# Patient Record
Sex: FEMALE | Race: ASIAN | Employment: UNEMPLOYED | URBAN - METROPOLITAN AREA
[De-identification: names, ages, dates, MRNs, and addresses within clinical notes are randomized per-mention and may not be internally consistent; named-entity substitution may affect disease eponyms.]

---

## 2017-07-21 ENCOUNTER — ALLSCRIPTS OFFICE VISIT (OUTPATIENT)
Dept: OTHER | Facility: OTHER | Age: 47
End: 2017-07-21

## 2017-07-21 DIAGNOSIS — K59.00 CONSTIPATION: ICD-10-CM

## 2017-07-21 DIAGNOSIS — Z12.31 ENCOUNTER FOR SCREENING MAMMOGRAM FOR MALIGNANT NEOPLASM OF BREAST: ICD-10-CM

## 2018-01-12 VITALS
TEMPERATURE: 99.3 F | DIASTOLIC BLOOD PRESSURE: 60 MMHG | SYSTOLIC BLOOD PRESSURE: 112 MMHG | BODY MASS INDEX: 23.73 KG/M2 | HEART RATE: 68 BPM | HEIGHT: 64 IN | RESPIRATION RATE: 14 BRPM | WEIGHT: 139 LBS

## 2018-01-16 NOTE — PROGRESS NOTES
Assessment    1  Anemia (285 9) (D64 9)   2  Hypothyroidism (244 9) (E03 9)   3  Vitamin D deficiency (268 9) (E55 9)    Plan  Anemia    · (1) IRON PANEL; Status:Active; Requested TUR:[id number];    · 1 - Malika DAVE, Ramana Rogers  (Hematology/Oncology) Physician Referral  Consult  Status:  Active  Requested for: [id number]  Care Summary provided  : Yes    Discussion/Summary    Anemia: hemoglobin 9 2 complains of fatigue and dizziness is intolerant to PO iron, 2/2 excessive constipation  Has had colonoscopy in the past  will refer to hematology, Iron studies ordered  Vitamin D def: Weekly Vitamin D ordered  Hypothyroidism: Stable continue Levothyroxine @ 137 mcg daily  Possible side effects of new medications were reviewed with the patient/guardian today  The treatment plan was reviewed with the patient/guardian  The patient/guardian understands and agrees with the treatment plan   The patient was counseled regarding prognosis, patient and family education, impressions, risks and benefits of treatment options  Chief Complaint  Feels tired       History of Present Illness  Patient in the office for follow up   Complains of fatigue and dizziness, Fatigue more when she has her periods  Has history of anemia and is currently not taking iron 2/2 her intolerance to PO iron,  She denies any bleeding per rectum she has had issues with blood in the stool for which she has followed with colo- rectal surgeon  She denies chest pain , shortness of breath nausea/vomiting  Review of Systems    Constitutional: as noted in HPI  Active Problems    1  Acute upper respiratory infection (465 9) (J06 9)   2  Anemia (285 9) (D64 9)   3  Constipation (564 00) (K59 00)   4  Dysuria (788 1) (R30 0)   5  Dysuria (788 1) (R30 0)   6  Encounter for screening mammogram for malignant neoplasm of breast (V76 12)   (Z12 31)   7  Fatigue (780 79) (R53 83)   8  Gastrointestinal bleeding (578 9) (K92 2)   9   Hemorrhoids (455 6) (K64 9)   10  History of familial combined hyperlipidemia (V12 29) (Z86 39)   11  Hypothyroidism (244 9) (E03 9)   12  Iron deficiency anemia (280 9) (D50 9)   13  Joint pain (719 40) (M25 50)   14  Need for prophylactic vaccination and inoculation against influenza (V04 81) (Z23)   15  Vitamin D deficiency (268 9) (E55 9)    Past Medical History    1  History of allergic rhinitis (V12 69) (Z87 09)   2  History of dyspareunia (V13 29)   3  History of hypercholesterolemia (V12 29) (Z86 39)   4  History of hypothyroidism (V12 29) (Z86 39)   5  Need for prophylactic vaccination and inoculation against influenza (V04 81) (Z23)    The active problems and past medical history were reviewed and updated today  Family History    The family history was reviewed and updated today  Social History    · Never A Smoker  The social history was reviewed and updated today  The social history was reviewed and is unchanged  Current Meds   1  Colace 100 MG Oral Capsule; TAKE 1 CAPSULE TWICE DAILY AS NEEDED; Therapy: 36WCP2728 to (Jennifer Nicholas)  Requested for: 64Efy5776; Last   Rx:[id number] Ordered   2  Ferrous Sulfate 325 (65 Fe) MG Oral Tablet; TAKE 1 TABLET DAILY WITH FOOD; Therapy: 19Iry7070 to [id number])  Requested for: 79212 80 22 97; Last   Rx:[id number] Ordered   3  Levothyroxine Sodium 137 MCG Oral Tablet; TAKE 1 TABLET DAILY; Therapy: 00OJO9010 to (Evaluate:63Enc8425)  Requested for: 20DTM5398; Last   Rx:00Nxy4200 Ordered   4  Vitamin D 2000 UNIT Oral Capsule; TAKE 1 CAPSULE Daily; Therapy: 92MFF3198 to (Evaluate:94Lcw2205)  Requested for: 78Nsa6753; Last   Rx:61Kbd0399 Ordered    The medication list was reviewed and updated today  Allergies    1  Amoxicillin CAPS   2  Sulfa Drugs    3  No Known Food Allergies    Physical Exam    Constitutional   General appearance: No acute distress, well appearing and well nourished      Ears, Nose, Mouth, and Throat   Otoscopic examination: Tympanic membranes translucent with normal light reflex  Canals patent without erythema  Pulmonary   Auscultation of lungs: Clear to auscultation  Abdomen   Abdomen: Non-tender, no masses  Musculoskeletal   Gait and station: Normal     Skin   Skin and subcutaneous tissue: Normal without rashes or lesions  Psychiatric   Orientation to person, place, and time: Normal     Mood and affect: Normal          Attending Note  Attending Note: I discussed the case with the Resident and reviewed the Resident's note, I supervised the Resident and I agree with the Resident management plan as it was presented to me  Level of Participation: I was present in clinic, but did not examine the patient  I agree with the Resident's note        Future Appointments    Date/Time Provider Specialty Site   07/19/2016 08:15 AM Latha Daly MD Hematology Oncology [facility]     Signatures   Electronically signed by : Shahbaz Mello, ; Jun 12 2016  4:44PM EST                       (Author)    Electronically signed by : Andre Chamberlain DO; Jun 14 2016  1:12PM EST                       (Author)

## 2018-01-24 ENCOUNTER — TELEPHONE (OUTPATIENT)
Dept: OTHER | Facility: HOSPITAL | Age: 48
End: 2018-01-24

## 2018-01-24 ENCOUNTER — APPOINTMENT (OUTPATIENT)
Dept: LAB | Facility: CLINIC | Age: 48
End: 2018-01-24
Payer: COMMERCIAL

## 2018-01-24 ENCOUNTER — TRANSCRIBE ORDERS (OUTPATIENT)
Dept: LAB | Facility: CLINIC | Age: 48
End: 2018-01-24

## 2018-01-24 DIAGNOSIS — E03.9 HYPOTHYROIDISM, UNSPECIFIED TYPE: Primary | ICD-10-CM

## 2018-01-24 DIAGNOSIS — K59.00 CONSTIPATION: ICD-10-CM

## 2018-01-24 LAB
ERYTHROCYTE [DISTWIDTH] IN BLOOD BY AUTOMATED COUNT: 20.7 % (ref 11.6–15.1)
HCT VFR BLD AUTO: 28.8 % (ref 34.8–46.1)
HGB BLD-MCNC: 8.3 G/DL (ref 11.5–15.4)
MCH RBC QN AUTO: 19.6 PG (ref 26.8–34.3)
MCHC RBC AUTO-ENTMCNC: 28.8 G/DL (ref 31.4–37.4)
MCV RBC AUTO: 68 FL (ref 82–98)
PLATELET # BLD AUTO: 402 THOUSANDS/UL (ref 149–390)
PMV BLD AUTO: 9.2 FL (ref 8.9–12.7)
RBC # BLD AUTO: 4.23 MILLION/UL (ref 3.81–5.12)
TSH SERPL DL<=0.05 MIU/L-ACNC: 5.43 UIU/ML (ref 0.36–3.74)
WBC # BLD AUTO: 6.63 THOUSAND/UL (ref 4.31–10.16)

## 2018-01-24 PROCEDURE — 84443 ASSAY THYROID STIM HORMONE: CPT

## 2018-01-24 PROCEDURE — 85027 COMPLETE CBC AUTOMATED: CPT

## 2018-01-24 PROCEDURE — 36415 COLL VENOUS BLD VENIPUNCTURE: CPT

## 2018-01-24 RX ORDER — DOCUSATE SODIUM 100 MG/1
1 CAPSULE, LIQUID FILLED ORAL 2 TIMES DAILY PRN
COMMUNITY
Start: 2013-07-26

## 2018-01-24 RX ORDER — FERROUS SULFATE 325(65) MG
1 TABLET ORAL DAILY
COMMUNITY
Start: 2015-04-24 | End: 2020-09-11 | Stop reason: ALTCHOICE

## 2018-01-24 RX ORDER — LEVOTHYROXINE SODIUM 137 UG/1
1 TABLET ORAL DAILY
COMMUNITY
Start: 2011-11-11 | End: 2018-02-26 | Stop reason: DRUGHIGH

## 2018-01-24 RX ORDER — SENNA AND DOCUSATE SODIUM 50; 8.6 MG/1; MG/1
8.6-5 TABLET, FILM COATED ORAL 2 TIMES DAILY PRN
COMMUNITY
Start: 2017-07-21 | End: 2019-04-04 | Stop reason: ALTCHOICE

## 2018-01-26 RX ORDER — LEVOTHYROXINE SODIUM 137 UG/1
137 TABLET ORAL DAILY
Refills: 0 | OUTPATIENT
Start: 2018-01-26

## 2018-01-26 RX ORDER — LEVOTHYROXINE SODIUM 0.15 MG/1
150 TABLET ORAL DAILY
Qty: 30 TABLET | Refills: 1 | Status: SHIPPED | OUTPATIENT
Start: 2018-01-26 | End: 2018-02-26 | Stop reason: SDUPTHER

## 2018-02-26 DIAGNOSIS — E03.9 HYPOTHYROIDISM, UNSPECIFIED TYPE: ICD-10-CM

## 2018-02-26 RX ORDER — LEVOTHYROXINE SODIUM 0.15 MG/1
150 TABLET ORAL DAILY
Qty: 30 TABLET | Refills: 1 | Status: SHIPPED | OUTPATIENT
Start: 2018-02-26 | End: 2018-04-30 | Stop reason: SDUPTHER

## 2018-03-12 ENCOUNTER — TELEPHONE (OUTPATIENT)
Dept: FAMILY MEDICINE CLINIC | Facility: CLINIC | Age: 48
End: 2018-03-12

## 2018-03-12 DIAGNOSIS — Z11.1 NORMAL SCREENING CHEST X-RAY FOR TUBERCULOSIS: Primary | ICD-10-CM

## 2018-03-12 NOTE — TELEPHONE ENCOUNTER
Patient's employer requesting chest xray as she is not candidate for PPD placement  They are also requesting health maintenance which is scheduled for Fri 3/16/18  Would you please enter chest xray order so she can have done prior to visit  Thanks!

## 2018-03-14 ENCOUNTER — HOSPITAL ENCOUNTER (OUTPATIENT)
Dept: RADIOLOGY | Facility: HOSPITAL | Age: 48
Discharge: HOME | End: 2018-03-14
Payer: COMMERCIAL

## 2018-03-14 DIAGNOSIS — Z11.1 NORMAL SCREENING CHEST X-RAY FOR TUBERCULOSIS: ICD-10-CM

## 2018-03-14 PROCEDURE — 71046 X-RAY EXAM CHEST 2 VIEWS: CPT

## 2018-03-16 ENCOUNTER — TELEPHONE (OUTPATIENT)
Dept: FAMILY MEDICINE CLINIC | Facility: CLINIC | Age: 48
End: 2018-03-16

## 2018-03-16 ENCOUNTER — OFFICE VISIT (OUTPATIENT)
Dept: FAMILY MEDICINE CLINIC | Facility: CLINIC | Age: 48
End: 2018-03-16
Payer: COMMERCIAL

## 2018-03-16 VITALS
HEIGHT: 64 IN | DIASTOLIC BLOOD PRESSURE: 60 MMHG | RESPIRATION RATE: 16 BRPM | BODY MASS INDEX: 22.94 KG/M2 | WEIGHT: 134.4 LBS | HEART RATE: 76 BPM | SYSTOLIC BLOOD PRESSURE: 100 MMHG | TEMPERATURE: 97.8 F

## 2018-03-16 DIAGNOSIS — M25.541 ARTHRALGIA OF BOTH HANDS: ICD-10-CM

## 2018-03-16 DIAGNOSIS — R53.83 OTHER FATIGUE: ICD-10-CM

## 2018-03-16 DIAGNOSIS — Z00.00 ENCOUNTER FOR ANNUAL HEALTH EXAMINATION: Primary | ICD-10-CM

## 2018-03-16 DIAGNOSIS — M25.542 ARTHRALGIA OF BOTH HANDS: ICD-10-CM

## 2018-03-16 DIAGNOSIS — Z23 NEED FOR INFLUENZA VACCINATION: ICD-10-CM

## 2018-03-16 DIAGNOSIS — Z23 NEED FOR TDAP VACCINATION: ICD-10-CM

## 2018-03-16 DIAGNOSIS — D50.9 IRON DEFICIENCY ANEMIA, UNSPECIFIED IRON DEFICIENCY ANEMIA TYPE: ICD-10-CM

## 2018-03-16 DIAGNOSIS — E03.9 HYPOTHYROIDISM, UNSPECIFIED TYPE: ICD-10-CM

## 2018-03-16 PROCEDURE — 90715 TDAP VACCINE 7 YRS/> IM: CPT | Performed by: FAMILY MEDICINE

## 2018-03-16 PROCEDURE — 90686 IIV4 VACC NO PRSV 0.5 ML IM: CPT | Performed by: FAMILY MEDICINE

## 2018-03-16 PROCEDURE — 99396 PREV VISIT EST AGE 40-64: CPT | Performed by: FAMILY MEDICINE

## 2018-03-16 PROCEDURE — 90471 IMMUNIZATION ADMIN: CPT | Performed by: FAMILY MEDICINE

## 2018-03-16 NOTE — ASSESSMENT & PLAN NOTE
On iron daily and docusate 100 mg b i d  Last CBC in January 2018 showed hemoglobin 8 3  Advised patient to eat iron rich diet, continue same medication regimen     will repeat CBC

## 2018-03-16 NOTE — ASSESSMENT & PLAN NOTE
TSH in January 2018 showed 5 4, levothyroxine was increased to 150 mcg, will repeat TSH levels  and advised to continue same medication regimen at this time

## 2018-03-16 NOTE — ASSESSMENT & PLAN NOTE
Likely due to stress at home  Differential:  Stress at home versus electrolyte deficiency,  Versus anemia  Versus autoimmune pathology versus uncontrolled hypothyroidism  Will check CBC, BMP, lipid panel, sed rate to rule out pathology  Advised patient to eat healthy diet

## 2018-03-16 NOTE — PROGRESS NOTES
Dayanna Hdz 1970 female MRN: [id number]    Health Maintenance Visit       Assessment and plan  Shekhar was seen today for  Health maintenance      ASSESSMENT/PLAN  Problem List Items Addressed This Visit     Fatigue      Likely due to stress at home  Differential:  Stress at home versus electrolyte deficiency,  Versus anemia  Versus autoimmune pathology versus uncontrolled hypothyroidism  Will check CBC, BMP, lipid panel, sed rate to rule out pathology  Advised patient to eat healthy diet  Hypothyroidism      TSH in January 2018 showed 5 4, levothyroxine was increased to 150 mcg, will repeat TSH levels  and advised to continue same medication regimen at this time  Relevant Orders    Lipid panel    TSH, 3rd generation    Iron deficiency anemia      On iron daily and docusate 100 mg b i d  Last CBC in January 2018 showed hemoglobin 8 3  Advised patient to eat iron rich diet, continue same medication regimen  will repeat CBC         Relevant Orders    CBC    Joint pain      Ordered CBC, sed rate to rule out  Inflammatory disease  Advised to take Tylenol p r n  for pain         Relevant Orders    Basic metabolic panel    Sedimentation rate, automated    Encounter for annual health examination - Primary      Screening mammogram script given to patient  Last Pap with HPV negative in 2015,  Next due in 2020  Tdap and flu vaccine given at this visit  PHQ 2 scale 0   Hearing within normal limits   Vision 20/40 bilaterally with corrective glasses  Advised to follow up with optometrist  for vision check  In addition to the above, the patient was counseled on general preventative health care subjects, including but not limited to:  - Nutrition, healthy weight, aerobic and weight-bearing exercise  - Mental health, social support, and self care  - Patient made aware of  services at the office                 Other Visit Diagnoses     Need for influenza vaccination Relevant Orders    FLU VACCINE QUADRIVALENT GREATER THAN OR EQUAL TO 4YO PRESERVATIVE FREE IM (Completed)    Need for Tdap vaccination        Relevant Orders    TDAP VACCINE GREATER THAN OR EQUAL TO 8YO IM (Completed)          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, required chest x-ray for tuberculosis screening as she had BCG  Vaccine  Patient will drop paperwork for nursing home at our office  Chest x-ray results are pending  Fill out paperwork for patient once I received chest x-ray result  Follow-up in 2 months, advised to schedule appointment if she experiences vaginal discharge again  SUBJECTIVE    HPI:  Lorenzo Pena is a 41 y o  female who presented for a routine health maintenance visit  Pap test questions: periods are regular  no unusual pelvic pain  no previous abnormal Pap tests  no family or personal history of cervical cancer  mammography questions: no new or changing breast lumps  no unusual breast pains  no discharge from the nipples  no previous abnormal mammograms  no personal or family history of breast cancer      CRC screening: No personal or family history of colon cancer or colon polyps  BrCa screening: There is no personal or family history of breast cancer  She denies finding new breast lumps, breast pain or nipple discharge  CVS screening: Patient denies any exertional chest pain, dyspnea, palpitations, syncope, orthopnea, edema or paroxysmal nocturnal dyspnea  DM screening: No polyuria, polydipsia, blurry vision, chest pain, dyspnea or claudication  No foot burning, numbness or pain  No personal or family history of skin cancers or melanoma  Patient reports having vaginal discharge on and off since few months, specially before and after menstrual cycle  She is currently having menstrual cycle  Denies any pelvic pain  Also reports the pain in multiple joints of fingers and sometimes knee pain  Also experiencing muscle cramps in both legs and feet occasionally   had bypass surgery, reports having stress at home  Review of Systems   Constitutional: Positive for fatigue  Negative for chills and unexpected weight change  HENT: Negative  Eyes: Negative  Respiratory: Negative  Cardiovascular: Negative  Gastrointestinal: Negative  Genitourinary: Positive for vaginal discharge  Negative for dyspareunia, dysuria, frequency, menstrual problem and pelvic pain  Musculoskeletal: Positive for arthralgias  Negative for back pain, gait problem and joint swelling  Skin: Negative  Allergic/Immunologic: Negative  Neurological: Negative  Hematological: Negative  Psychiatric/Behavioral: Negative  Historical Information   Past Medical History:   Diagnosis Date    Dyspareunia in female     Hypercholesterolemia     Hypothyroidism        No past surgical history on file  No family history on file  Social History   History   Alcohol use Not on file     History   Drug use: Unknown     History   Smoking Status    Unknown If Ever Smoked   Smokeless Tobacco    Not on file       Medications:    Current Outpatient Prescriptions   Medication Sig Dispense Refill    docusate sodium (COLACE) 100 mg capsule Take 1 capsule by mouth 2 (two) times a day as needed      ferrous sulfate 325 (65 Fe) mg tablet Take 1 tablet by mouth Daily      levothyroxine 150 mcg tablet Take 1 tablet (150 mcg total) by mouth daily 30 tablet 1    senna-docusate sodium (SENOKOT-S) 8 6-50 mg per tablet Take 8 6-50 mg by mouth 2 (two) times a day as needed       No current facility-administered medications for this visit            Allergies   Allergen Reactions    Amoxicillin     Sulfa Antibiotics        OBJECTIVE  Vitals:   Vitals:    03/16/18 1102   BP: 100/60   Pulse: 76   Resp: 16   Temp: 97 8 °F (36 6 °C)   Weight: 61 kg (134 lb 6 4 oz)   Height: 5' 4 1" (1 628 m)     Wt Readings from Last 3 Encounters:   03/16/18 61 kg (134 lb 6 4 oz)   07/21/17 63 kg (139 lb) 06/03/16 67 2 kg (148 lb 4 oz)     Body mass index is 23 kg/m²  BP Readings from Last 3 Encounters:   03/16/18 100/60   07/21/17 112/60   06/03/16 104/70     Pulse Readings from Last 3 Encounters:   03/16/18 76   07/21/17 68   06/03/16 70       Physical Exam   Constitutional: She is oriented to person, place, and time  She appears well-developed and well-nourished  No distress  HENT:   Head: Normocephalic and atraumatic  Right Ear: External ear normal    Left Ear: External ear normal    Mouth/Throat: Oropharynx is clear and moist  No oropharyngeal exudate  Eyes: Conjunctivae and EOM are normal  Pupils are equal, round, and reactive to light  Right eye exhibits no discharge  Left eye exhibits no discharge  Neck: Normal range of motion  Neck supple  No thyromegaly present  Cardiovascular: Normal rate, regular rhythm and normal heart sounds  Exam reveals no gallop and no friction rub  No murmur heard  Pulmonary/Chest: Effort normal and breath sounds normal  No respiratory distress  She has no wheezes  She has no rales  She exhibits no tenderness  Abdominal: Soft  Bowel sounds are normal  She exhibits no distension  There is no tenderness  There is no rebound and no guarding  Musculoskeletal: Normal range of motion  Very mild varicose vein  In lower extremities  No swelling at joints of  Fingers bilaterally, full range of motion,   Painful on range of motion  Neurological: She is alert and oriented to person, place, and time  She exhibits normal muscle tone  Skin: Skin is warm  No rash noted  Psychiatric: She has a normal mood and affect  Her behavior is normal  Judgment and thought content normal         Lab:  I have personally reviewed all pertinent results       Appointment on 01/24/2018   Component Date Value Ref Range Status    WBC 01/24/2018 6 63  4 31 - 10 16 Thousand/uL Final    RBC 01/24/2018 4 23  3 81 - 5 12 Million/uL Final    Hemoglobin 01/24/2018 8 3* 11 5 - 15 4 g/dL Final    Hematocrit 01/24/2018 28 8* 34 8 - 46 1 % Final    MCV 01/24/2018 68* 82 - 98 fL Final    MCH 01/24/2018 19 6* 26 8 - 34 3 pg Final    MCHC 01/24/2018 28 8* 31 4 - 37 4 g/dL Final    RDW 01/24/2018 20 7* 11 6 - 15 1 % Final    Platelets 17/79/2678 402* 149 - 390 Thousands/uL Final    MPV 01/24/2018 9 2  8 9 - 12 7 fL Final    TSH 3RD GENERATON 01/24/2018 5 429* 0 358 - 3 740 uIU/mL Final       Most Recent Immunizations   Administered Date(s) Administered     Influenza (IM) Preservative Free 01/11/2013    Influenza Quadrivalent Preservative Free 3 years and older IM 03/16/2018    Influenza TIV (IM) 11/10/2014    Tdap 03/16/2018       Immunization status: up to date and documented         _____________________________________________________________________     John Lara MD, PGY-2  [facility] Medicine   3/16/2018

## 2018-03-16 NOTE — ASSESSMENT & PLAN NOTE
Screening mammogram script given to patient  Last Pap with HPV negative in 2015,  Next due in 2020  Tdap and flu vaccine given at this visit  PHQ 2 scale 0   Hearing within normal limits   Vision 20/40 bilaterally with corrective glasses  Advised to follow up with optometrist  for vision check  In addition to the above, the patient was counseled on general preventative health care subjects, including but not limited to:  - Nutrition, healthy weight, aerobic and weight-bearing exercise  - Mental health, social support, and self care  - Patient made aware of  services at the office

## 2018-03-16 NOTE — TELEPHONE ENCOUNTER
Pt was seen today by Dr Tania Avila  Wants xray results  Dropped off information sheet for Dr Tania Avila with info on where to fax results Fax number [phone]  Placed in Dr Miriam Alves bin  States she was told by Dr Tania Avila to drop off the info      Any questions please call her

## 2018-04-30 DIAGNOSIS — E03.9 HYPOTHYROIDISM, UNSPECIFIED TYPE: ICD-10-CM

## 2018-04-30 RX ORDER — LEVOTHYROXINE SODIUM 0.15 MG/1
TABLET ORAL
Qty: 30 TABLET | Refills: 1 | Status: SHIPPED | OUTPATIENT
Start: 2018-04-30 | End: 2018-06-29 | Stop reason: SDUPTHER

## 2018-06-29 DIAGNOSIS — E03.9 HYPOTHYROIDISM, UNSPECIFIED TYPE: ICD-10-CM

## 2018-06-29 RX ORDER — LEVOTHYROXINE SODIUM 0.15 MG/1
150 TABLET ORAL DAILY
Qty: 30 TABLET | Refills: 0 | Status: SHIPPED | OUTPATIENT
Start: 2018-06-29 | End: 2018-07-31 | Stop reason: SDUPTHER

## 2018-07-31 DIAGNOSIS — E03.9 HYPOTHYROIDISM, UNSPECIFIED TYPE: ICD-10-CM

## 2018-08-01 RX ORDER — LEVOTHYROXINE SODIUM 0.15 MG/1
150 TABLET ORAL DAILY
Qty: 30 TABLET | Refills: 0 | Status: SHIPPED | OUTPATIENT
Start: 2018-08-01 | End: 2018-08-16 | Stop reason: SDUPTHER

## 2018-08-01 NOTE — TELEPHONE ENCOUNTER
Patient has pending blood work - TSH  Last labs were in 01/2018  Please advise patient to do blood work  I will refill medication for 30 days  Thank you

## 2018-08-16 ENCOUNTER — APPOINTMENT (OUTPATIENT)
Dept: LAB | Facility: CLINIC | Age: 48
End: 2018-08-16

## 2018-08-16 ENCOUNTER — TRANSCRIBE ORDERS (OUTPATIENT)
Dept: LAB | Facility: CLINIC | Age: 48
End: 2018-08-16

## 2018-08-16 DIAGNOSIS — E03.9 HYPOTHYROIDISM, UNSPECIFIED TYPE: ICD-10-CM

## 2018-08-16 DIAGNOSIS — N94.6 DYSMENORRHEA: Primary | ICD-10-CM

## 2018-08-16 DIAGNOSIS — M25.541 ARTHRALGIA OF BOTH HANDS: ICD-10-CM

## 2018-08-16 DIAGNOSIS — M25.542 ARTHRALGIA OF BOTH HANDS: ICD-10-CM

## 2018-08-16 DIAGNOSIS — D50.9 IRON DEFICIENCY ANEMIA, UNSPECIFIED IRON DEFICIENCY ANEMIA TYPE: ICD-10-CM

## 2018-08-16 LAB
ANION GAP SERPL CALCULATED.3IONS-SCNC: 4 MMOL/L (ref 4–13)
BUN SERPL-MCNC: 12 MG/DL (ref 5–25)
CALCIUM SERPL-MCNC: 8.6 MG/DL (ref 8.3–10.1)
CHLORIDE SERPL-SCNC: 105 MMOL/L (ref 100–108)
CHOLEST SERPL-MCNC: 176 MG/DL (ref 50–200)
CO2 SERPL-SCNC: 29 MMOL/L (ref 21–32)
CREAT SERPL-MCNC: 0.75 MG/DL (ref 0.6–1.3)
ERYTHROCYTE [DISTWIDTH] IN BLOOD BY AUTOMATED COUNT: 18.6 % (ref 11.6–15.1)
ERYTHROCYTE [SEDIMENTATION RATE] IN BLOOD: 13 MM/HOUR (ref 0–20)
GFR SERPL CREATININE-BSD FRML MDRD: 96 ML/MIN/1.73SQ M
GLUCOSE P FAST SERPL-MCNC: 88 MG/DL (ref 65–99)
HCT VFR BLD AUTO: 29.2 % (ref 34.8–46.1)
HDLC SERPL-MCNC: 48 MG/DL (ref 40–60)
HGB BLD-MCNC: 9 G/DL (ref 11.5–15.4)
LDLC SERPL CALC-MCNC: 104 MG/DL (ref 0–100)
MCH RBC QN AUTO: 22.4 PG (ref 26.8–34.3)
MCHC RBC AUTO-ENTMCNC: 30.8 G/DL (ref 31.4–37.4)
MCV RBC AUTO: 73 FL (ref 82–98)
NONHDLC SERPL-MCNC: 128 MG/DL
PLATELET # BLD AUTO: 317 THOUSANDS/UL (ref 149–390)
PMV BLD AUTO: 8.9 FL (ref 8.9–12.7)
POTASSIUM SERPL-SCNC: 3.9 MMOL/L (ref 3.5–5.3)
RBC # BLD AUTO: 4.01 MILLION/UL (ref 3.81–5.12)
SODIUM SERPL-SCNC: 138 MMOL/L (ref 136–145)
TRIGL SERPL-MCNC: 119 MG/DL
TSH SERPL DL<=0.05 MIU/L-ACNC: 5.19 UIU/ML (ref 0.36–3.74)
WBC # BLD AUTO: 5.56 THOUSAND/UL (ref 4.31–10.16)

## 2018-08-16 PROCEDURE — 36415 COLL VENOUS BLD VENIPUNCTURE: CPT

## 2018-08-16 PROCEDURE — 85027 COMPLETE CBC AUTOMATED: CPT

## 2018-08-16 PROCEDURE — 80061 LIPID PANEL: CPT

## 2018-08-16 PROCEDURE — 85652 RBC SED RATE AUTOMATED: CPT

## 2018-08-16 PROCEDURE — 84443 ASSAY THYROID STIM HORMONE: CPT

## 2018-08-16 PROCEDURE — 80048 BASIC METABOLIC PNL TOTAL CA: CPT

## 2018-08-16 RX ORDER — LEVOTHYROXINE SODIUM 175 UG/1
175 TABLET ORAL DAILY
Qty: 60 TABLET | Refills: 0 | Status: SHIPPED | OUTPATIENT
Start: 2018-08-16 | End: 2018-10-25 | Stop reason: SDUPTHER

## 2018-08-16 RX ORDER — MEFENAMIC ACID 250 MG/1
250 CAPSULE ORAL EVERY 6 HOURS PRN
Qty: 60 CAPSULE | Refills: 0 | Status: SHIPPED | OUTPATIENT
Start: 2018-08-16 | End: 2019-04-04 | Stop reason: SDUPTHER

## 2018-09-04 ENCOUNTER — HOSPITAL ENCOUNTER (OUTPATIENT)
Dept: RADIOLOGY | Age: 48
Discharge: HOME | End: 2018-09-04

## 2018-09-04 DIAGNOSIS — Z12.31 ENCOUNTER FOR SCREENING MAMMOGRAM FOR MALIGNANT NEOPLASM OF BREAST: ICD-10-CM

## 2018-09-04 PROCEDURE — 77067 SCR MAMMO BI INCL CAD: CPT

## 2018-10-16 ENCOUNTER — TRANSCRIBE ORDERS (OUTPATIENT)
Dept: LAB | Facility: CLINIC | Age: 48
End: 2018-10-16

## 2018-10-16 ENCOUNTER — APPOINTMENT (OUTPATIENT)
Dept: LAB | Facility: CLINIC | Age: 48
End: 2018-10-16

## 2018-10-16 DIAGNOSIS — E03.9 HYPOTHYROIDISM, UNSPECIFIED TYPE: ICD-10-CM

## 2018-10-16 LAB — TSH SERPL DL<=0.05 MIU/L-ACNC: 0.95 UIU/ML (ref 0.36–3.74)

## 2018-10-16 PROCEDURE — 36415 COLL VENOUS BLD VENIPUNCTURE: CPT

## 2018-10-16 PROCEDURE — 84443 ASSAY THYROID STIM HORMONE: CPT

## 2018-10-25 DIAGNOSIS — E03.9 HYPOTHYROIDISM, UNSPECIFIED TYPE: ICD-10-CM

## 2018-10-26 RX ORDER — LEVOTHYROXINE SODIUM 175 UG/1
175 TABLET ORAL DAILY
Qty: 90 TABLET | Refills: 1 | Status: SHIPPED | OUTPATIENT
Start: 2018-10-26 | End: 2019-04-26 | Stop reason: SDUPTHER

## 2018-11-05 ENCOUNTER — OFFICE VISIT (OUTPATIENT)
Dept: URGENT CARE | Age: 48
End: 2018-11-05
Payer: COMMERCIAL

## 2018-11-05 ENCOUNTER — HOSPITAL ENCOUNTER (EMERGENCY)
Facility: HOSPITAL | Age: 48
Discharge: HOME | End: 2018-11-05
Attending: EMERGENCY MEDICINE
Payer: COMMERCIAL

## 2018-11-05 VITALS
TEMPERATURE: 98.2 F | OXYGEN SATURATION: 99 % | SYSTOLIC BLOOD PRESSURE: 145 MMHG | RESPIRATION RATE: 20 BRPM | DIASTOLIC BLOOD PRESSURE: 64 MMHG | HEART RATE: 90 BPM

## 2018-11-05 VITALS
TEMPERATURE: 98.7 F | DIASTOLIC BLOOD PRESSURE: 76 MMHG | SYSTOLIC BLOOD PRESSURE: 124 MMHG | WEIGHT: 135 LBS | HEIGHT: 64 IN | BODY MASS INDEX: 23.05 KG/M2 | HEART RATE: 75 BPM | OXYGEN SATURATION: 100 %

## 2018-11-05 DIAGNOSIS — M54.9 BACK PAIN: ICD-10-CM

## 2018-11-05 DIAGNOSIS — N39.0 URINARY TRACT INFECTION WITHOUT HEMATURIA, SITE UNSPECIFIED: Primary | ICD-10-CM

## 2018-11-05 DIAGNOSIS — M54.50 ACUTE BILATERAL LOW BACK PAIN WITHOUT SCIATICA: ICD-10-CM

## 2018-11-05 DIAGNOSIS — K62.5 RECTAL BLEEDING: ICD-10-CM

## 2018-11-05 DIAGNOSIS — N39.0 UTI (URINARY TRACT INFECTION): Primary | ICD-10-CM

## 2018-11-05 LAB
SL AMB  POCT GLUCOSE, UA: ABNORMAL
SL AMB LEUKOCYTE ESTERASE,UA: ABNORMAL
SL AMB POCT BILIRUBIN,UA: ABNORMAL
SL AMB POCT BLOOD,UA: ABNORMAL
SL AMB POCT CLARITY,UA: CLEAR
SL AMB POCT COLOR,UA: YELLOW
SL AMB POCT KETONES,UA: ABNORMAL
SL AMB POCT NITRITE,UA: ABNORMAL
SL AMB POCT PH,UA: 5
SL AMB POCT SPECIFIC GRAVITY,UA: 1
SL AMB POCT URINE PROTEIN: ABNORMAL
SL AMB POCT UROBILINOGEN: 0.2

## 2018-11-05 PROCEDURE — 82272 OCCULT BLD FECES 1-3 TESTS: CPT

## 2018-11-05 PROCEDURE — 99283 EMERGENCY DEPT VISIT LOW MDM: CPT

## 2018-11-05 PROCEDURE — 87086 URINE CULTURE/COLONY COUNT: CPT | Performed by: NURSE PRACTITIONER

## 2018-11-05 PROCEDURE — 81002 URINALYSIS NONAUTO W/O SCOPE: CPT | Performed by: FAMILY MEDICINE

## 2018-11-05 PROCEDURE — 99213 OFFICE O/P EST LOW 20 MIN: CPT | Performed by: FAMILY MEDICINE

## 2018-11-05 RX ORDER — ACETAMINOPHEN 325 MG/1
650 TABLET ORAL ONCE
Status: COMPLETED | OUTPATIENT
Start: 2018-11-05 | End: 2018-11-05

## 2018-11-05 RX ORDER — CIPROFLOXACIN 250 MG/1
500 TABLET, FILM COATED ORAL EVERY 12 HOURS SCHEDULED
Qty: 10 TABLET | Refills: 0 | Status: SHIPPED | OUTPATIENT
Start: 2018-11-05 | End: 2018-11-10

## 2018-11-05 RX ORDER — CIPROFLOXACIN 500 MG/1
500 TABLET, FILM COATED ORAL ONCE
Status: COMPLETED | OUTPATIENT
Start: 2018-11-05 | End: 2018-11-05

## 2018-11-05 RX ADMIN — ACETAMINOPHEN 650 MG: 325 TABLET, FILM COATED ORAL at 17:13

## 2018-11-05 RX ADMIN — CIPROFLOXACIN HYDROCHLORIDE 500 MG: 500 TABLET, FILM COATED ORAL at 17:13

## 2018-11-05 NOTE — PROGRESS NOTES
[facility]        NAME: Chuyita Becker is a 57 y o  female  : 1970    MRN: [id number]  DATE: 2018  TIME: 3:01 PM    Assessment and Plan   Urinary tract infection without hematuria, site unspecified [N39 0]  1  Urinary tract infection without hematuria, site unspecified  ciprofloxacin (CIPRO) 250 mg tablet    POCT urine dip   2  Acute bilateral low back pain without sciatica  Transfer to other facility     At end of visit, patient states she will go to [facility]  Patient Instructions     Patient Instructions   As we discussed there is moderated amount of Leukocytes and small amount of blood in urine  This is seen with UTI  At this time VSS, we can try outpatient treatment to see if there is improvement or you can go to ER for full evaluation  At this time you are requesting to start antibiotic  Continue Tylenol or Motrin  If symptoms become worse, you develop fever or any worsening symptoms you need to go directly to ER  You verbalized understanding of this  For the hemorrhoids continue OTC treatment  Follow up with GI for evaluation  Go to ER if pain become worse, or you develop rectal bleeding  Chief Complaint     Chief Complaint   Patient presents with    Hemorrhoids    Back Pain         History of Present Illness   Chuyita Becker presents to the clinic c/o    This is a 57year old female here today with back pain x 1 week  She has history of hemorroroids which is flaring  She states she has a flare about every 2-3 months  No specific injury  No history of back pain  She did try tylenol 2 days ago which did help  She has been using witch hazel, preparation H cream   She has noticed some blood in stool yesterday in toilet and then again when she wiped  No loss of bowel or bladder  No fever  No numbness or tingling down of leg  No history of kidney stone  No urinary symptoms, no nausea, vomiting  She states nothing makes pain worse            Review of Systems   Review of Systems   Constitutional: Positive for activity change  Negative for chills, fatigue and fever  HENT: Negative  Respiratory: Negative  Cardiovascular: Negative  Gastrointestinal: Negative  Genitourinary: Negative  Musculoskeletal: Positive for arthralgias and back pain  Neurological: Negative  Current Medications     Long-Term Prescriptions   Medication Sig Dispense Refill    docusate sodium (COLACE) 100 mg capsule Take 1 capsule by mouth 2 (two) times a day as needed      levothyroxine 175 mcg tablet Take 1 tablet (175 mcg total) by mouth daily 90 tablet 1    ferrous sulfate 325 (65 Fe) mg tablet Take 1 tablet by mouth Daily      Mefenamic Acid 250 MG CAPS Take 1 capsule (250 mg total) by mouth every 6 (six) hours as needed (menstrual cycle pain) (Patient not taking: Reported on 11/5/2018 ) 60 capsule 0    senna-docusate sodium (SENOKOT-S) 8 6-50 mg per tablet Take 8 6-50 mg by mouth 2 (two) times a day as needed         Current Allergies     Allergies as of 11/05/2018 - Reviewed 11/05/2018   Allergen Reaction Noted    Amoxicillin  01/11/2013    Sulfa antibiotics  12/12/2013            The following portions of the patient's history were reviewed and updated as appropriate: allergies, current medications, past family history, past medical history, past social history, past surgical history and problem list     Objective   /76   Pulse 75   Temp 98 7 °F (37 1 °C)   Ht 5' 4" (1 626 m)   Wt 61 2 kg (135 lb)   LMP 10/17/2018   SpO2 100%   BMI 23 17 kg/m²        Physical Exam     Physical Exam   Constitutional: She is oriented to person, place, and time  She appears well-developed  HENT:   Head: Normocephalic  Right Ear: External ear normal    Left Ear: External ear normal    Neck: Normal range of motion  Pulmonary/Chest: Effort normal    Musculoskeletal:   Lumbar spine:  No ttp over the spine,  Minimal TTP over paralumbar region  Full ROM  Negative straight leg test   No CVA tenderness  Neurological: She is alert and oriented to person, place, and time  Skin: Skin is warm and dry  Psychiatric: She has a normal mood and affect  Her behavior is normal    Nursing note and vitals reviewed  Urine dip: positive leukocytes and blood

## 2018-11-05 NOTE — ED PROVIDER NOTES
History  Chief Complaint   Patient presents with    Back Pain     Patient c/o lower back pain for approx 1 week  Patient sent in by another facility to r/o UTI/Kidney stone   Hemorrhoids     Patient c/o having hemorrhoids and is having "fresh blood" coming out from the rectal area  Noticed when she wiped her rectal after after using the bathroom yesterday  Patient presents to the emergency department for evaluation of rectal bleeding  Patient states that she has external hemorrhoids and she will intermittently see blood on the toilet paper  She did so 2 days ago  She was seen at [facility] prior to arrival diagnosed with the urinary tract infection  She was given a script for ciprofloxacin  The patient denies fever chills nausea vomiting or diarrhea  She denies abdominal pain  She denies vaginal bleeding or bleeding elsewhere  She is not on blood thinners  She did not receive pain medicine or ciprofloxacin at [facility]  Prior to Admission Medications   Prescriptions Last Dose Informant Patient Reported? Taking?    Mefenamic Acid 250 MG CAPS  Self No No   Sig: Take 1 capsule (250 mg total) by mouth every 6 (six) hours as needed (menstrual cycle pain)   Patient not taking: Reported on 11/5/2018    ciprofloxacin (CIPRO) 250 mg tablet   No No   Sig: Take 2 tablets (500 mg total) by mouth every 12 (twelve) hours for 5 days   docusate sodium (COLACE) 100 mg capsule  Self Yes No   Sig: Take 1 capsule by mouth 2 (two) times a day as needed   ferrous sulfate 325 (65 Fe) mg tablet  Self Yes No   Sig: Take 1 tablet by mouth Daily   levothyroxine 175 mcg tablet  Self No No   Sig: Take 1 tablet (175 mcg total) by mouth daily   senna-docusate sodium (SENOKOT-S) 8 6-50 mg per tablet  Self Yes No   Sig: Take 8 6-50 mg by mouth 2 (two) times a day as needed      Facility-Administered Medications: None       Past Medical History:   Diagnosis Date    Dyspareunia in female    [facility] Hypercholesterolemia     Hypothyroidism        History reviewed  No pertinent surgical history  History reviewed  No pertinent family history  I have reviewed and agree with the history as documented  Social History   Substance Use Topics    Smoking status: Never Smoker    Smokeless tobacco: Never Used    Alcohol use No        Review of Systems   Constitutional: Negative  Negative for activity change, appetite change, chills, diaphoresis, fatigue, fever and unexpected weight change  HENT: Negative  Negative for congestion, drooling, rhinorrhea, trouble swallowing and voice change  Eyes: Negative  Negative for photophobia and visual disturbance  Respiratory: Negative  Negative for chest tightness, shortness of breath, wheezing and stridor  Cardiovascular: Negative  Negative for chest pain, palpitations and leg swelling  Gastrointestinal: Positive for anal bleeding  Negative for abdominal distention, abdominal pain, blood in stool, constipation, diarrhea, nausea, rectal pain and vomiting  Endocrine: Negative  Genitourinary: Positive for dysuria, frequency and urgency  Negative for vaginal bleeding, vaginal discharge and vaginal pain  Musculoskeletal: Positive for back pain  Negative for arthralgias, joint swelling, myalgias, neck pain and neck stiffness  Skin: Negative  Negative for rash and wound  Allergic/Immunologic: Negative  Neurological: Negative  Negative for dizziness, tremors, seizures, syncope, facial asymmetry, speech difficulty, weakness, light-headedness, numbness and headaches  Hematological: Negative  Does not bruise/bleed easily  Psychiatric/Behavioral: Negative  Negative for confusion  Physical Exam  Physical Exam   Constitutional: She is oriented to person, place, and time  She appears well-developed and well-nourished  Nontoxic appearance  No distress  HENT:   Head: Normocephalic and atraumatic     Right Ear: External ear normal    Left Ear: External ear normal    Mouth/Throat: Oropharynx is clear and moist    Eyes: Pupils are equal, round, and reactive to light  Conjunctivae and EOM are normal    Neck: Normal range of motion  Neck supple  Cardiovascular: Normal rate, regular rhythm, normal heart sounds and intact distal pulses  Pulmonary/Chest: Effort normal and breath sounds normal  No respiratory distress  She has no wheezes  She has no rales  She exhibits no tenderness  Abdominal: Soft  Bowel sounds are normal  She exhibits no distension and no mass  There is no tenderness  There is no rebound and no guarding  No hernia  No reproducible abdominal tenderness to palpation  No peritoneal signs  Genitourinary:   Genitourinary Comments: Rectal examination performed with Beka Ortiz the nurse covering the patient  There are 2 small nonbleeding non thrombosed external hemorrhoids  No fissures or other lesions  Was no bright red blood or melena  The stool was Hemoccult negative for blood  Musculoskeletal: Normal range of motion  She exhibits no edema, tenderness or deformity  No reproducible flank tenderness  Mild paralumbar tenderness to palpation with mild decreased range of motion secondary to pain  Neurological: She is alert and oriented to person, place, and time  She has normal reflexes  She displays normal reflexes  No cranial nerve deficit or sensory deficit  She exhibits normal muscle tone  Coordination normal    Skin: Skin is warm and dry  No rash noted  No erythema  No pallor  Psychiatric: She has a normal mood and affect  Her behavior is normal  Judgment and thought content normal    Nursing note and vitals reviewed        Vital Signs  ED Triage Vitals   Temperature Pulse Respirations Blood Pressure SpO2   11/05/18 1603 11/05/18 1601 11/05/18 1601 11/05/18 1601 11/05/18 1601   98 2 °F (36 8 °C) 90 20 145/64 99 %      Temp Source Heart Rate Source Patient Position - Orthostatic VS BP Location FiO2 (%)   11/05/18 1603 11/05/18 1601 11/05/18 1601 11/05/18 1601 --   Oral Monitor Sitting Left arm       Pain Score       11/05/18 1601       8           Vitals:    11/05/18 1601   BP: 145/64   Pulse: 90   Patient Position - Orthostatic VS: Sitting       Visual Acuity      ED Medications  Medications   ciprofloxacin (CIPRO) tablet 500 mg (500 mg Oral Given 11/5/18 1713)   acetaminophen (TYLENOL) tablet 650 mg (650 mg Oral Given 11/5/18 1713)       Diagnostic Studies  Results Reviewed     None                 No orders to display              Procedures  Procedures       Phone Contacts  ED Phone Contact    ED Course  ED Course as of Nov 05 1720   [facility] Nov 05, 2018   1708 Patient is stable for discharge  Patient was given Cipro for her urinary tract infection and Tylenol for her back pain  Rectal exam showed no evidence of bleeding  Her Hemoccult was negative  She will be referred to GI  I did discuss signs and symptoms that would require return to the emergency department  MDM  CritCare Time    Disposition  Final diagnoses:   UTI (urinary tract infection)   Back pain   Rectal bleeding     Time reflects when diagnosis was documented in both MDM as applicable and the Disposition within this note     Time User Action Codes Description Comment    11/5/2018  5:08 PM Ankur Lion Add [N39 0] UTI (urinary tract infection)     11/5/2018  5:08 PM Ankur Lion Add [M54 9] Back pain     11/5/2018  5:08 PM Ankur Reyes Add [K62 5] Rectal bleeding       ED Disposition     ED Disposition Condition Comment    Discharge  Kevin Last discharge to home/self care      Condition at discharge: Good        Follow-up Information     Follow up With Specialties Details Why Contact Mauricio Koo MD Gastroenterology Schedule an appointment as soon as possible for a visit  [address] [place] [address]  [phone]            Discharge Medication List as of 11/5/2018  5:10 PM      CONTINUE these medications which have NOT CHANGED    Details   ciprofloxacin (CIPRO) 250 mg tablet Take 2 tablets (500 mg total) by mouth every 12 (twelve) hours for 5 days, Starting Mon 11/5/2018, Until Sat 11/10/2018, Normal      docusate sodium (COLACE) 100 mg capsule Take 1 capsule by mouth 2 (two) times a day as needed, Starting Fri 7/26/2013, Historical Med      ferrous sulfate 325 (65 Fe) mg tablet Take 1 tablet by mouth Daily, Starting Fri 4/24/2015, Historical Med      levothyroxine 175 mcg tablet Take 1 tablet (175 mcg total) by mouth daily, Starting Fri 10/26/2018, Normal      Mefenamic Acid 250 MG CAPS Take 1 capsule (250 mg total) by mouth every 6 (six) hours as needed (menstrual cycle pain), Starting Thu 8/16/2018, Normal      senna-docusate sodium (SENOKOT-S) 8 6-50 mg per tablet Take 8 6-50 mg by mouth 2 (two) times a day as needed, Starting Fri 7/21/2017, Historical Med           No discharge procedures on file      ED Provider  Electronically Signed by           Hunter Oropeza MD  11/05/18 0233

## 2018-11-05 NOTE — PATIENT INSTRUCTIONS
As we discussed there is moderated amount of Leukocytes and small amount of blood in urine  This is seen with UTI  At this time VSS, we can try outpatient treatment to see if there is improvement or you can go to ER for full evaluation  At this time you are requesting to start antibiotic  Continue Tylenol or Motrin  If symptoms become worse, you develop fever or any worsening symptoms you need to go directly to ER  You verbalized understanding of this  For the hemorrhoids continue OTC treatment  Follow up with GI for evaluation  Go to ER if pain become worse, or you develop rectal bleeding

## 2018-11-05 NOTE — DISCHARGE INSTRUCTIONS
Urinary Tract Infection in Women   WHAT YOU NEED TO KNOW:   What is a urinary tract infection (UTI)? A UTI is caused by bacteria that get inside your urinary tract  Your urinary tract includes your kidneys, ureters, bladder, and urethra  Urine is made in your kidneys, and it flows from the ureters to the bladder  Urine leaves the bladder through the urethra  A UTI is more common in your lower urinary tract, which includes your bladder and urethra  What increases my risk for a UTI? · A urinary catheter or self-catheterization    · Pregnancy    · Urinary tract problems, such as a narrowing, kidney stones, or inability to empty your bladder completely    · History of a UTI    · Sexual intercourse    · Menopause    · Diabetes or obesity  What are the signs and symptoms of a UTI? · Urinating more often than usual, leaking urine, or waking from sleep to urinate    · Pain or burning when you urinate    · Pain or pressure in your lower abdomen     · Urine that smells bad    · Blood in your urine  How is a UTI diagnosed? Your healthcare provider will ask about your signs and symptoms  Your provider may press on your abdomen, sides, and back to check if you feel pain  Your urine will be tested for bacteria that may be causing your infection  If you have UTIs often, you may need more tests to find the cause  How is a UTI treated? · Antibiotics  help fight a bacterial infection  · Medicines  may be given to decrease pain and burning when you urinate  They will also help decrease the feeling that you need to urinate often  These medicines will make your urine orange or red  What can I do to prevent a UTI? · Empty your bladder often  Urinate and empty your bladder as soon as you feel the need  Do not hold your urine for long periods of time  · Wipe from front to back after you urinate or have a bowel movement    This will help prevent germs from getting into your urinary tract through your urethra  · Drink liquids as directed  Ask how much liquid to drink each day and which liquids are best for you  You may need to drink more liquids than usual to help flush out the bacteria  Do not drink alcohol, caffeine, or citrus juices  These can irritate your bladder and increase your symptoms  Your healthcare provider may recommend cranberry juice to help prevent a UTI  · Urinate after you have sex  This can help flush out bacteria passed during sex  · Do not douche or use feminine deodorants  These can change the chemical balance in your vagina  · Change sanitary pads or tampons often  This will help prevent germs from getting into your urinary tract  · Do pelvic muscle exercises often  Pelvic muscle exercises may help you start and stop urinating  Strong pelvic muscles may help you empty your bladder easier  Squeeze these muscles tightly for 5 seconds like you are trying to hold back urine  Then relax for 5 seconds  Gradually work up to squeezing for 10 seconds  Do 3 sets of 15 repetitions a day, or as directed  When should I seek immediate care? · You are urinating very little or not at all  · You have a high fever with shaking chills  · You have side or back pain that gets worse  When should I contact my healthcare provider? · You have a mild fever  · You do not feel better after 2 days of taking antibiotics  · You have new symptoms, such as blood or pus in your urine  · You are vomiting  · You have questions or concerns about your condition or care  CARE AGREEMENT:   You have the right to help plan your care  Learn about your health condition and how it may be treated  Discuss treatment options with your caregivers to decide what care you want to receive  You always have the right to refuse treatment  The above information is an  only  It is not intended as medical advice for individual conditions or treatments   Talk to your doctor, nurse or pharmacist before following any medical regimen to see if it is safe and effective for you  © 2017 [employer] Information is for End User's use only and may not be sold, redistributed or otherwise used for commercial purposes  All illustrations and images included in CareNotes® are the copyrighted property of [employer]  or [employer]  Rectal Bleeding   WHAT YOU NEED TO KNOW:   What can cause rectal bleeding? · Constipation    · Hemorrhoids (swollen blood vessels in your rectum)    · Anal fissures (tears in the tissue inside your anus)    · Medical conditions, such as cancer, colitis, or diverticulitis     · Growths, such as tumors or polyps    · Medical treatments, such as radiation or rectal surgery  What increases my risk for rectal bleeding? · Older age    · Certain medicines, such as blood thinners and NSAIDs    · Medical conditions, such as inflammatory bowel disease, liver disease, or HIV  What other signs and symptoms may happen with rectal bleeding? You may have pain in your rectum or anus  You may also have abdominal pain or cramping  How is the cause of rectal bleeding diagnosed? · Rectal exam:  Your healthcare provider may gently insert a gloved finger into your anus  He will collect a bowel movement sample and send it to a lab for tests  · Blood tests: You may need blood taken to check for anemia (low amount of red blood cells)  · CT scan: This test is also called a CAT scan  An x-ray machine uses a computer to take pictures of the organs and blood vessels in your abdomen  The pictures may show problems that could cause bleeding  You may be given a dye before the pictures are taken to help healthcare providers see the pictures better  Tell the healthcare provider if you have ever had an allergic reaction to contrast dye  · Colonoscopy: This is a procedure to look inside your lower bowel   It may show where the bleeding is coming from and what is causing it  A tube with a light on the end will be put into your anus and then moved into your colon  If your healthcare provider finds a growth, he may remove it  · Endoscopy: This is a procedure to look inside your upper bowel  It may show where the bleeding is coming from and what is causing it  A tube with a light on the end is inserted into your throat and moved down into your stomach and upper bowel  If your healthcare provider finds a growth, he may remove it  He may put a shot of medicine in bleeding areas to narrow the blood vessels and stop the bleeding  Heat, laser, or electric currents may also be used to make the blood clot  How is rectal bleeding treated? · Medicine:      ¨ Pain medicine: You may be given a prescription medicine to decrease pain  Do not wait until the pain is severe before you take this medicine  ¨ Vasoconstrictors: This medicine decreases the size of your blood vessels and may help stop the bleeding  ¨ Iron supplement:  Iron helps your body make more red blood cells  ¨ Steroids: This medicine decreases inflammation in your rectum  It may be applied as a cream, ointment, or lotion  · IV:  You may need an IV if you are dehydrated and need extra liquids  · Blood transfusion:  You will get whole or parts of blood through an IV during a transfusion  Blood is tested for diseases, such as hepatitis and HIV, to be sure it is safe  · Surgery: You may need surgery to remove hemorrhoids, tumors, or polyps  What are the risks of rectal bleeding? · You may have abdominal pain or damage to nearby organs and blood vessels with surgery  Even with treatment, rectal bleeding may continue  Or, it may go away for a time and start again  · Without treatment, you may continue to have pain and cramping  You may develop anemia  You may need a blood transfusion  You may lose a large amount of blood  This can be life-threatening  How can I manage my symptoms?   Ask your healthcare provider how much liquid to drink each day and which liquids are best for you  This will help prevent dehydration and constipation  When should I contact my healthcare provider? · You have a fever  · Your rectal bleeding stopped for a time, but has started again  · You have nausea  · You have cold, sweaty, pale skin  · You have changes in your bowel movements, such as diarrhea  · You have questions or concerns about your condition or care  When should I seek immediate care or call 911? · You are breathing faster than usual     · You are dizzy, lightheaded, or feel faint  · You are confused or cannot think clearly  · You urinate less than usual or not at all  · Your rectal bleeding is constant or heavy  · You have severe abdominal pain or cramping  CARE AGREEMENT:   You have the right to help plan your care  Learn about your health condition and how it may be treated  Discuss treatment options with your caregivers to decide what care you want to receive  You always have the right to refuse treatment  The above information is an  only  It is not intended as medical advice for individual conditions or treatments  Talk to your doctor, nurse or pharmacist before following any medical regimen to see if it is safe and effective for you  © 2017 [employer]  Information is for End User's use only and may not be sold, redistributed or otherwise used for commercial purposes  All illustrations and images included in CareNotes® are the copyrighted property of [employer]  or [employer]

## 2018-11-06 LAB — BACTERIA UR CULT: NORMAL

## 2018-11-07 ENCOUNTER — TELEPHONE (OUTPATIENT)
Dept: URGENT CARE | Age: 48
End: 2018-11-07

## 2018-11-07 NOTE — TELEPHONE ENCOUNTER
Urine culture negative  Recommend she stop antibiotic  She continues to have back pain  She has been using Tylenol BID  She states she now has some pain in her legs  Recommend she follow up with PCP  If symptoms get worse to go to ER

## 2019-02-19 ENCOUNTER — OFFICE VISIT (OUTPATIENT)
Dept: FAMILY MEDICINE CLINIC | Facility: CLINIC | Age: 49
End: 2019-02-19

## 2019-02-19 VITALS
TEMPERATURE: 98.1 F | RESPIRATION RATE: 16 BRPM | SYSTOLIC BLOOD PRESSURE: 110 MMHG | HEART RATE: 78 BPM | HEIGHT: 64 IN | BODY MASS INDEX: 24.11 KG/M2 | WEIGHT: 141.2 LBS | DIASTOLIC BLOOD PRESSURE: 66 MMHG

## 2019-02-19 DIAGNOSIS — K64.4 EXTERNAL HEMORRHOIDS: ICD-10-CM

## 2019-02-19 DIAGNOSIS — Z23 ENCOUNTER FOR IMMUNIZATION: ICD-10-CM

## 2019-02-19 DIAGNOSIS — R53.83 OTHER FATIGUE: ICD-10-CM

## 2019-02-19 DIAGNOSIS — I83.893 VARICOSE VEINS OF LEG WITH SWELLING, BILATERAL: ICD-10-CM

## 2019-02-19 DIAGNOSIS — E03.9 HYPOTHYROIDISM, UNSPECIFIED TYPE: Primary | ICD-10-CM

## 2019-02-19 DIAGNOSIS — D50.9 IRON DEFICIENCY ANEMIA, UNSPECIFIED IRON DEFICIENCY ANEMIA TYPE: ICD-10-CM

## 2019-02-19 DIAGNOSIS — R00.2 PALPITATIONS: ICD-10-CM

## 2019-02-19 PROCEDURE — 90686 IIV4 VACC NO PRSV 0.5 ML IM: CPT | Performed by: FAMILY MEDICINE

## 2019-02-19 PROCEDURE — 99213 OFFICE O/P EST LOW 20 MIN: CPT | Performed by: FAMILY MEDICINE

## 2019-02-19 PROCEDURE — 90471 IMMUNIZATION ADMIN: CPT | Performed by: FAMILY MEDICINE

## 2019-02-19 NOTE — PROGRESS NOTES
Daryel Alpers 1970 female MRN: [id number]  Family medicine follow up Visit        ASSESSMENT/PLAN  Diagnoses and all orders for this visit:    Hypothyroidism:  Continue levothyroxine 175 mcg daily  Check TSH and lipid panel     -     TSH, 3rd generation; Future  -     Lipid panel; Future    Iron deficiency anemia:  Check CBC, continue iron once daily  Advised patient eat diet rich in iron  -     CBC; Future    External hemorrhoids:  Advised patient to increase water and fiber intake in diet  Prescribed nifedipine lidocaine rectal med for hemorrhoids  Continue Colace 100 mg b i d  For constipation and  can use MiraLax as needed  Referral given to surgery for surgical management  -     Ambulatory referral to General Surgery; Future  -     NIFEdipine 0 3%-lidocaine 5% rectal ointment; Apply a small amount to anal hemorrhoids 3 times daily prn      Varicose veins of leg with swelling, bilateral:  Well criteria score: 1, (tenderness on localized area), low pretest probability  Check D-dimer for DVT  -     D-dimer, quantitative; Future    Palpitations:  Likely anxiety related or overcontrolled hypothyroidism  Will check TSH levels  EKG in office was not working, ordered EKG to rule out any cardiac abnormality  -      ECG      Follow-up in 3 months, will call patient with results  SUBJECTIVE  CC: Follow up for hypothyroidism, iron deficiency anemia, external hemorrhoids  New complaints:  Palpitations, lower leg pain  HPI  Daryel Alpers is a 57 y o  female here for follow up for hypothyroidism, iron deficiency anemia, external hemorrhoids  Hypothyroidism:  She reports taking levothyroxine 175 mcg daily  She denies any heat or cold intolerance, weight changes  Denies any side effects with medication  Iron deficiency anemia:  She takes iron intermittently as a cause of her constipation  She reports eating green vegetables  a lot  She has been taking Colace for constipation    She reports drinking lot of water and fiber in her diet  External hemorrhoids:  She was seen in the ER few days ago for bleeding from external hemorrhoids  Palpitations:  Reports having palpitations occasionally more often before menstrual cycles since few months  Denies any anxiety at that time  Palpitations sometimes last for 3-4 hours and resolved on its own  She denies any chest pain  She does not have any previous cardiac history  He has been taking her levothyroxine as prescribed and last TSH was normal    Lower leg pain:  Reports bilateral lower leg pain behind knees occasionally  since few months  She also reports calf pain occasionally, not related to activity  Reported achy pain, 4/10, no aggravating or relieving factors  Denies any history of previous clot  She is worried that she may have clot in her leg as she heard from her friend  Review of Systems   Constitutional: Positive for fatigue  Negative for activity change and appetite change  HENT: Negative for congestion, postnasal drip and rhinorrhea  Respiratory: Negative for cough, shortness of breath and wheezing  Cardiovascular: Positive for palpitations  Negative for chest pain and leg swelling  Gastrointestinal: Negative for abdominal pain, constipation and diarrhea  Endocrine: Negative for cold intolerance, heat intolerance, polydipsia, polyphagia and polyuria  Musculoskeletal: Negative for arthralgias  Leg pain   Skin: Negative for color change  Neurological: Negative for weakness and headaches  Psychiatric/Behavioral: Negative for dysphoric mood and sleep disturbance  Historical Information   The patient history was reviewed as follows:  Past Medical History:   Diagnosis Date    Dyspareunia in female     Hypercholesterolemia     Hypothyroidism      History reviewed  No pertinent surgical history  History reviewed  No pertinent family history     Social History   Social History     Substance and Sexual Activity   Alcohol Use No     Social History     Substance and Sexual Activity   Drug Use No     Social History     Tobacco Use   Smoking Status Never Smoker   Smokeless Tobacco Never Used       Medications:   Meds/Allergies   Current Outpatient Medications   Medication Sig Dispense Refill    docusate sodium (COLACE) 100 mg capsule Take 1 capsule by mouth 2 (two) times a day as needed      ferrous sulfate 325 (65 Fe) mg tablet Take 1 tablet by mouth Daily      levothyroxine 175 mcg tablet Take 1 tablet (175 mcg total) by mouth daily 90 tablet 1    Mefenamic Acid 250 MG CAPS Take 1 capsule (250 mg total) by mouth every 6 (six) hours as needed (menstrual cycle pain) (Patient not taking: Reported on 11/5/2018 ) 60 capsule 0    NIFEdipine 0 3%-lidocaine 5% rectal ointment Apply a small amount to anal hemorrhoids 3 times daily prn 2 oz 0    senna-docusate sodium (SENOKOT-S) 8 6-50 mg per tablet Take 8 6-50 mg by mouth 2 (two) times a day as needed       No current facility-administered medications for this visit  Allergies   Allergen Reactions    Amoxicillin     Sulfa Antibiotics        OBJECTIVE  Vitals:   Vitals:    02/19/19 1111   BP: 110/66   Pulse: 78   Resp: 16   Temp: 98 1 °F (36 7 °C)   Weight: 64 kg (141 lb 3 2 oz)   Height: 5' 4" (1 626 m)       Invasive Devices          None          Physical Exam   Constitutional: She is oriented to person, place, and time  She appears well-developed and well-nourished  HENT:   Head: Normocephalic  Right Ear: External ear normal    Left Ear: External ear normal    Mouth/Throat: Oropharynx is clear and moist    Eyes: Conjunctivae are normal    Neck: Neck supple  Cardiovascular: Normal rate, regular rhythm, normal heart sounds and intact distal pulses  Pulmonary/Chest: Effort normal and breath sounds normal    Abdominal: Soft  Bowel sounds are normal    Musculoskeletal: Normal range of motion     Varicose vein at the back of knee, very minimal swelling bilaterally  Tenderness to palpation in the back of knee and 1 cm  Below back of knee  No calf tenderness on palpation bilaterally  Neurological: She is alert and oriented to person, place, and time  She exhibits normal muscle tone  Skin: Skin is warm  Psychiatric: She has a normal mood and affect   Her behavior is normal         _____________________________________________________________________

## 2019-02-20 ENCOUNTER — TELEPHONE (OUTPATIENT)
Dept: FAMILY MEDICINE CLINIC | Facility: CLINIC | Age: 49
End: 2019-02-20

## 2019-02-20 DIAGNOSIS — K64.4 EXTERNAL HEMORRHOIDS: Primary | ICD-10-CM

## 2019-02-20 NOTE — TELEPHONE ENCOUNTER
I prescribed that medication ( nifedipine- lidocaine ractal ointment) for hemorrhoids  If that is not available, I prescribed proctofoam rectal cream  Thank you

## 2019-02-22 ENCOUNTER — CONSULT (OUTPATIENT)
Dept: SURGERY | Facility: CLINIC | Age: 49
End: 2019-02-22
Payer: COMMERCIAL

## 2019-02-22 VITALS
SYSTOLIC BLOOD PRESSURE: 112 MMHG | TEMPERATURE: 98 F | WEIGHT: 138 LBS | RESPIRATION RATE: 18 BRPM | DIASTOLIC BLOOD PRESSURE: 68 MMHG | HEIGHT: 64 IN | HEART RATE: 65 BPM | BODY MASS INDEX: 23.56 KG/M2

## 2019-02-22 DIAGNOSIS — K62.5 RECTAL BLEEDING: ICD-10-CM

## 2019-02-22 DIAGNOSIS — K64.1 GRADE II HEMORRHOIDS: Primary | ICD-10-CM

## 2019-02-22 PROBLEM — K64.9 HEMORRHOIDS: Status: ACTIVE | Noted: 2019-02-19

## 2019-02-22 PROCEDURE — 99243 OFF/OP CNSLTJ NEW/EST LOW 30: CPT | Performed by: FAMILY MEDICINE

## 2019-02-22 NOTE — PROGRESS NOTES
Patient ID: Vivian Zuniga is a 57 y o  female  Chief Complaint: Rectal bleeding    Notes for this visit:  Rectal bleeding:  -Bleeding likely 2/2 hemorrhoids but she needs repeat colonoscopy to rule out other causes  -As she has symptoms approximately every three months, trial of life style modifications prior to further intervention is recommended  Encouraged to increase fiber intake and avoid hard stools  Discussed in detail   -Risks, benefits and alternatives discussed in detail  All questions answered and patient agrees  -Prescribed Analpram for use when symptomatic  -Schedule diagnostic colonoscopy      History:    HPI: 57 F complaining of painful bleeding hemorrhoids  Preceded by hard stool  Bleeding for two days, and pain for two weeks  Has symptom free interval up to three months - until next hard stool  Taking Metamucil once daily  On going problem for last ten years  Tried Preparation H with no improvement  Had colonoscopy ten years ago which showed hemorrhoids, per patient  Had banding five years ago with no improvement in symptoms  Saw PCP for this recently, was advised trial of nifedipine cream, high fiber diet, and referred here  The following portions of the patient's history were reviewed and updated as appropriate: allergies  Review of Systems   Constitutional: Negative for chills and fever  Respiratory: Negative for shortness of breath  Cardiovascular: Negative for chest pain  Gastrointestinal: Positive for anal bleeding, blood in stool and rectal pain  Negative for abdominal distention, abdominal pain, constipation, diarrhea, nausea and vomiting         Patient Active Problem List   Diagnosis    Constipation    Fatigue    Hypothyroidism    Iron deficiency anemia    Joint pain    Encounter for annual health examination    Dysmenorrhea    External hemorrhoids    Varicose veins of leg with swelling, bilateral     Current Outpatient Medications:     docusate sodium (COLACE) 100 mg capsule, Take 1 capsule by mouth 2 (two) times a day as needed, Disp: , Rfl:     ferrous sulfate 325 (65 Fe) mg tablet, Take 1 tablet by mouth Daily, Disp: , Rfl:     hydrocortisone-pramoxine (PROCTOFOAM-HC) rectal foam, Insert 1 applicator into the rectum 2 (two) times a day, Disp: 10 g, Rfl: 3    levothyroxine 175 mcg tablet, Take 1 tablet (175 mcg total) by mouth daily, Disp: 90 tablet, Rfl: 1    senna-docusate sodium (SENOKOT-S) 8 6-50 mg per tablet, Take 8 6-50 mg by mouth 2 (two) times a day as needed, Disp: , Rfl:     Mefenamic Acid 250 MG CAPS, Take 1 capsule (250 mg total) by mouth every 6 (six) hours as needed (menstrual cycle pain) (Patient not taking: Reported on 2/22/2019), Disp: 60 capsule, Rfl: 0    Social History     Tobacco Use    Smoking status: Never Smoker    Smokeless tobacco: Never Used   Substance Use Topics    Alcohol use: No       PHYSICAL EXAM    Vitals:   Vitals:    02/22/19 1125   BP: 112/68   BP Location: Left arm   Patient Position: Sitting   Cuff Size: Standard   Pulse: 65   Resp: 18   Temp: 98 °F (36 7 °C)   TempSrc: Tympanic   Weight: 62 6 kg (138 lb)   Height: 5' 4" (1 626 m)       Physical Exam   Constitutional: She appears well-developed and well-nourished  No distress  Cardiovascular: Normal rate  Pulmonary/Chest: Effort normal  No respiratory distress  Abdominal: Soft  She exhibits no distension  There is no tenderness  Genitourinary:   Genitourinary Comments: Anal skin tags  Skin: She is not diaphoretic  Nursing note and vitals reviewed

## 2019-02-26 ENCOUNTER — OFFICE VISIT (OUTPATIENT)
Dept: LAB | Facility: CLINIC | Age: 49
End: 2019-02-26
Payer: COMMERCIAL

## 2019-02-26 DIAGNOSIS — R00.2 PALPITATIONS: ICD-10-CM

## 2019-02-26 PROCEDURE — 93005 ELECTROCARDIOGRAM TRACING: CPT

## 2019-02-27 ENCOUNTER — APPOINTMENT (OUTPATIENT)
Dept: LAB | Facility: CLINIC | Age: 49
End: 2019-02-27
Payer: COMMERCIAL

## 2019-02-27 DIAGNOSIS — E03.9 HYPOTHYROIDISM, UNSPECIFIED TYPE: ICD-10-CM

## 2019-02-27 DIAGNOSIS — D50.9 IRON DEFICIENCY ANEMIA, UNSPECIFIED IRON DEFICIENCY ANEMIA TYPE: ICD-10-CM

## 2019-02-27 DIAGNOSIS — I83.893 VARICOSE VEINS OF LEG WITH SWELLING, BILATERAL: ICD-10-CM

## 2019-02-27 DIAGNOSIS — R53.83 OTHER FATIGUE: ICD-10-CM

## 2019-02-27 LAB
25(OH)D3 SERPL-MCNC: 34 NG/ML (ref 30–100)
ATRIAL RATE: 61 BPM
CHOLEST SERPL-MCNC: 158 MG/DL (ref 50–200)
DEPRECATED D DIMER PPP: <270 NG/ML (FEU)
ERYTHROCYTE [DISTWIDTH] IN BLOOD BY AUTOMATED COUNT: 22.6 % (ref 11.6–15.1)
HCT VFR BLD AUTO: 35.4 % (ref 34.8–46.1)
HDLC SERPL-MCNC: 52 MG/DL (ref 40–60)
HGB BLD-MCNC: 10.3 G/DL (ref 11.5–15.4)
LDLC SERPL CALC-MCNC: 88 MG/DL (ref 0–100)
MCH RBC QN AUTO: 22.3 PG (ref 26.8–34.3)
MCHC RBC AUTO-ENTMCNC: 29.1 G/DL (ref 31.4–37.4)
MCV RBC AUTO: 77 FL (ref 82–98)
NONHDLC SERPL-MCNC: 106 MG/DL
P AXIS: 32 DEGREES
PLATELET # BLD AUTO: 348 THOUSANDS/UL (ref 149–390)
PMV BLD AUTO: 9.8 FL (ref 8.9–12.7)
PR INTERVAL: 150 MS
QRS AXIS: 57 DEGREES
QRSD INTERVAL: 72 MS
QT INTERVAL: 440 MS
QTC INTERVAL: 442 MS
RBC # BLD AUTO: 4.62 MILLION/UL (ref 3.81–5.12)
T WAVE AXIS: 39 DEGREES
TRIGL SERPL-MCNC: 90 MG/DL
TSH SERPL DL<=0.05 MIU/L-ACNC: 1.01 UIU/ML (ref 0.36–3.74)
VENTRICULAR RATE: 61 BPM
WBC # BLD AUTO: 6.26 THOUSAND/UL (ref 4.31–10.16)

## 2019-02-27 PROCEDURE — 80061 LIPID PANEL: CPT

## 2019-02-27 PROCEDURE — 82306 VITAMIN D 25 HYDROXY: CPT

## 2019-02-27 PROCEDURE — 85379 FIBRIN DEGRADATION QUANT: CPT

## 2019-02-27 PROCEDURE — 93010 ELECTROCARDIOGRAM REPORT: CPT | Performed by: INTERNAL MEDICINE

## 2019-02-27 PROCEDURE — 36415 COLL VENOUS BLD VENIPUNCTURE: CPT

## 2019-02-27 PROCEDURE — 85027 COMPLETE CBC AUTOMATED: CPT

## 2019-02-27 PROCEDURE — 84443 ASSAY THYROID STIM HORMONE: CPT

## 2019-03-06 PROBLEM — K62.5 RECTAL BLEEDING: Status: ACTIVE | Noted: 2019-03-06

## 2019-04-04 ENCOUNTER — OFFICE VISIT (OUTPATIENT)
Dept: FAMILY MEDICINE CLINIC | Facility: CLINIC | Age: 49
End: 2019-04-04

## 2019-04-04 VITALS
BODY MASS INDEX: 24.55 KG/M2 | TEMPERATURE: 98.8 F | SYSTOLIC BLOOD PRESSURE: 110 MMHG | RESPIRATION RATE: 16 BRPM | DIASTOLIC BLOOD PRESSURE: 80 MMHG | HEART RATE: 72 BPM | WEIGHT: 143 LBS

## 2019-04-04 DIAGNOSIS — N94.6 DYSMENORRHEA: Primary | ICD-10-CM

## 2019-04-04 DIAGNOSIS — J30.9 ALLERGIC RHINITIS, UNSPECIFIED SEASONALITY, UNSPECIFIED TRIGGER: ICD-10-CM

## 2019-04-04 DIAGNOSIS — K64.9 HEMORRHOIDS, UNSPECIFIED HEMORRHOID TYPE: ICD-10-CM

## 2019-04-04 DIAGNOSIS — D50.9 IRON DEFICIENCY ANEMIA, UNSPECIFIED IRON DEFICIENCY ANEMIA TYPE: ICD-10-CM

## 2019-04-04 DIAGNOSIS — K59.00 CONSTIPATION, UNSPECIFIED CONSTIPATION TYPE: ICD-10-CM

## 2019-04-04 PROCEDURE — 99213 OFFICE O/P EST LOW 20 MIN: CPT | Performed by: FAMILY MEDICINE

## 2019-04-04 RX ORDER — MEFENAMIC ACID 250 MG/1
250 CAPSULE ORAL EVERY 6 HOURS PRN
Qty: 60 CAPSULE | Refills: 0 | Status: SHIPPED | OUTPATIENT
Start: 2019-04-04 | End: 2019-10-04

## 2019-04-04 RX ORDER — LORATADINE 10 MG/1
10 TABLET ORAL DAILY
Qty: 90 TABLET | Refills: 1 | Status: SHIPPED | OUTPATIENT
Start: 2019-04-04

## 2019-04-05 ENCOUNTER — TELEPHONE (OUTPATIENT)
Dept: FAMILY MEDICINE CLINIC | Facility: CLINIC | Age: 49
End: 2019-04-05

## 2019-04-24 ENCOUNTER — OFFICE VISIT (OUTPATIENT)
Dept: FAMILY MEDICINE CLINIC | Facility: CLINIC | Age: 49
End: 2019-04-24

## 2019-04-24 VITALS
SYSTOLIC BLOOD PRESSURE: 100 MMHG | DIASTOLIC BLOOD PRESSURE: 68 MMHG | BODY MASS INDEX: 24.21 KG/M2 | HEART RATE: 76 BPM | RESPIRATION RATE: 16 BRPM | HEIGHT: 64 IN | TEMPERATURE: 98.6 F | WEIGHT: 141.8 LBS

## 2019-04-24 DIAGNOSIS — M54.50 ACUTE BILATERAL LOW BACK PAIN WITHOUT SCIATICA: Primary | ICD-10-CM

## 2019-04-24 LAB
SL AMB  POCT GLUCOSE, UA: NORMAL
SL AMB LEUKOCYTE ESTERASE,UA: NORMAL
SL AMB POCT BILIRUBIN,UA: NORMAL
SL AMB POCT BLOOD,UA: NORMAL
SL AMB POCT CLARITY,UA: CLEAR
SL AMB POCT COLOR,UA: YELLOW
SL AMB POCT KETONES,UA: NORMAL
SL AMB POCT NITRITE,UA: NORMAL
SL AMB POCT PH,UA: 5
SL AMB POCT SPECIFIC GRAVITY,UA: 1.01
SL AMB POCT URINE PROTEIN: NORMAL
SL AMB POCT UROBILINOGEN: 0.2

## 2019-04-24 PROCEDURE — 81003 URINALYSIS AUTO W/O SCOPE: CPT | Performed by: FAMILY MEDICINE

## 2019-04-24 PROCEDURE — 99213 OFFICE O/P EST LOW 20 MIN: CPT | Performed by: FAMILY MEDICINE

## 2019-04-24 RX ORDER — CYCLOBENZAPRINE HCL 5 MG
10 TABLET ORAL 2 TIMES DAILY PRN
Qty: 14 TABLET | Refills: 0 | Status: SHIPPED | OUTPATIENT
Start: 2019-04-24 | End: 2019-06-06 | Stop reason: SDUPTHER

## 2019-04-24 RX ORDER — LIDOCAINE 50 MG/G
1 PATCH TOPICAL DAILY
Qty: 10 PATCH | Refills: 0 | Status: SHIPPED | OUTPATIENT
Start: 2019-04-24 | End: 2019-10-04

## 2019-04-26 DIAGNOSIS — E03.9 HYPOTHYROIDISM, UNSPECIFIED TYPE: ICD-10-CM

## 2019-04-28 RX ORDER — LEVOTHYROXINE SODIUM 175 UG/1
175 TABLET ORAL DAILY
Qty: 90 TABLET | Refills: 1 | Status: SHIPPED | OUTPATIENT
Start: 2019-04-28 | End: 2019-10-24 | Stop reason: SDUPTHER

## 2019-05-03 DIAGNOSIS — K64.1 GRADE II HEMORRHOIDS: ICD-10-CM

## 2019-06-04 ENCOUNTER — OFFICE VISIT (OUTPATIENT)
Dept: SURGERY | Facility: CLINIC | Age: 49
End: 2019-06-04
Payer: COMMERCIAL

## 2019-06-04 VITALS
HEIGHT: 64 IN | HEART RATE: 66 BPM | DIASTOLIC BLOOD PRESSURE: 70 MMHG | WEIGHT: 141 LBS | BODY MASS INDEX: 24.07 KG/M2 | SYSTOLIC BLOOD PRESSURE: 102 MMHG | TEMPERATURE: 97.9 F | RESPIRATION RATE: 18 BRPM

## 2019-06-04 DIAGNOSIS — K62.5 RECTAL BLEEDING: ICD-10-CM

## 2019-06-04 DIAGNOSIS — K64.9 HEMORRHOIDS, UNSPECIFIED HEMORRHOID TYPE: Primary | ICD-10-CM

## 2019-06-04 PROCEDURE — 99213 OFFICE O/P EST LOW 20 MIN: CPT | Performed by: PHYSICIAN ASSISTANT

## 2019-06-06 ENCOUNTER — OFFICE VISIT (OUTPATIENT)
Dept: FAMILY MEDICINE CLINIC | Facility: CLINIC | Age: 49
End: 2019-06-06

## 2019-06-06 VITALS
BODY MASS INDEX: 24.52 KG/M2 | HEIGHT: 64 IN | HEART RATE: 78 BPM | RESPIRATION RATE: 16 BRPM | SYSTOLIC BLOOD PRESSURE: 120 MMHG | TEMPERATURE: 98.3 F | WEIGHT: 143.6 LBS | DIASTOLIC BLOOD PRESSURE: 80 MMHG

## 2019-06-06 DIAGNOSIS — K64.9 HEMORRHOIDS, UNSPECIFIED HEMORRHOID TYPE: ICD-10-CM

## 2019-06-06 DIAGNOSIS — R10.2 SUPRAPUBIC DISCOMFORT: ICD-10-CM

## 2019-06-06 DIAGNOSIS — B96.89 BACTERIAL VAGINOSIS: ICD-10-CM

## 2019-06-06 DIAGNOSIS — N76.0 BACTERIAL VAGINOSIS: ICD-10-CM

## 2019-06-06 DIAGNOSIS — M54.50 ACUTE BILATERAL LOW BACK PAIN WITHOUT SCIATICA: Primary | ICD-10-CM

## 2019-06-06 LAB
SL AMB  POCT GLUCOSE, UA: NEGATIVE
SL AMB LEUKOCYTE ESTERASE,UA: NEGATIVE
SL AMB POCT BILIRUBIN,UA: NEGATIVE
SL AMB POCT BLOOD,UA: NEGATIVE
SL AMB POCT COLOR,UA: CLEAR
SL AMB POCT KETONES,UA: NEGATIVE
SL AMB POCT NITRITE,UA: NEGATIVE
SL AMB POCT PH,UA: 7
SL AMB POCT SPECIFIC GRAVITY,UA: 1
SL AMB POCT URINE PROTEIN: NORMAL
SL AMB POCT UROBILINOGEN: 0.2

## 2019-06-06 PROCEDURE — 81003 URINALYSIS AUTO W/O SCOPE: CPT | Performed by: FAMILY MEDICINE

## 2019-06-06 PROCEDURE — 99213 OFFICE O/P EST LOW 20 MIN: CPT | Performed by: FAMILY MEDICINE

## 2019-06-06 RX ORDER — HYDROCORTISONE ACETATE 25 MG/1
25 SUPPOSITORY RECTAL 2 TIMES DAILY PRN
Qty: 12 SUPPOSITORY | Refills: 0 | Status: SHIPPED | OUTPATIENT
Start: 2019-06-06 | End: 2019-06-27 | Stop reason: SDUPTHER

## 2019-06-06 RX ORDER — CYCLOBENZAPRINE HCL 10 MG
10 TABLET ORAL
Qty: 10 TABLET | Refills: 0 | Status: SHIPPED | OUTPATIENT
Start: 2019-06-06 | End: 2019-10-28

## 2019-06-06 RX ORDER — METRONIDAZOLE 500 MG/1
500 TABLET ORAL EVERY 12 HOURS SCHEDULED
Qty: 14 TABLET | Refills: 0 | Status: SHIPPED | OUTPATIENT
Start: 2019-06-06 | End: 2019-06-13

## 2019-06-07 ENCOUNTER — TELEPHONE (OUTPATIENT)
Dept: FAMILY MEDICINE CLINIC | Facility: CLINIC | Age: 49
End: 2019-06-07

## 2019-06-13 ENCOUNTER — TELEPHONE (OUTPATIENT)
Dept: FAMILY MEDICINE CLINIC | Facility: CLINIC | Age: 49
End: 2019-06-13

## 2019-06-18 ENCOUNTER — OFFICE VISIT (OUTPATIENT)
Dept: OBGYN CLINIC | Facility: CLINIC | Age: 49
End: 2019-06-18
Payer: COMMERCIAL

## 2019-06-18 VITALS
WEIGHT: 140 LBS | SYSTOLIC BLOOD PRESSURE: 110 MMHG | DIASTOLIC BLOOD PRESSURE: 76 MMHG | BODY MASS INDEX: 23.9 KG/M2 | HEIGHT: 64 IN

## 2019-06-18 DIAGNOSIS — R10.2 PELVIC PAIN: Primary | ICD-10-CM

## 2019-06-18 DIAGNOSIS — N64.4 BREAST PAIN: ICD-10-CM

## 2019-06-18 PROCEDURE — 99204 OFFICE O/P NEW MOD 45 MIN: CPT | Performed by: PHYSICIAN ASSISTANT

## 2019-06-19 PROBLEM — N92.1 MENORRHAGIA WITH IRREGULAR CYCLE: Status: ACTIVE | Noted: 2019-06-19

## 2019-06-19 PROBLEM — R10.2 PELVIC PAIN: Status: ACTIVE | Noted: 2019-06-19

## 2019-06-19 PROBLEM — N92.6 IRREGULAR MENSES: Status: ACTIVE | Noted: 2019-06-19

## 2019-06-19 PROBLEM — N64.4 BREAST PAIN: Status: ACTIVE | Noted: 2019-06-19

## 2019-06-21 ENCOUNTER — HOSPITAL ENCOUNTER (OUTPATIENT)
Dept: RADIOLOGY | Age: 49
Discharge: HOME | End: 2019-06-21
Payer: COMMERCIAL

## 2019-06-21 DIAGNOSIS — R10.2 PELVIC PAIN: ICD-10-CM

## 2019-06-21 PROCEDURE — 76830 TRANSVAGINAL US NON-OB: CPT

## 2019-06-21 PROCEDURE — 76856 US EXAM PELVIC COMPLETE: CPT

## 2019-06-27 ENCOUNTER — OFFICE VISIT (OUTPATIENT)
Dept: FAMILY MEDICINE CLINIC | Facility: CLINIC | Age: 49
End: 2019-06-27

## 2019-06-27 VITALS
SYSTOLIC BLOOD PRESSURE: 104 MMHG | BODY MASS INDEX: 23.9 KG/M2 | DIASTOLIC BLOOD PRESSURE: 76 MMHG | HEIGHT: 64 IN | TEMPERATURE: 99.3 F | RESPIRATION RATE: 16 BRPM | WEIGHT: 140 LBS | HEART RATE: 68 BPM

## 2019-06-27 DIAGNOSIS — R10.9 FLANK PAIN: Primary | ICD-10-CM

## 2019-06-27 DIAGNOSIS — K64.9 HEMORRHOIDS, UNSPECIFIED HEMORRHOID TYPE: ICD-10-CM

## 2019-06-27 PROCEDURE — 99213 OFFICE O/P EST LOW 20 MIN: CPT | Performed by: FAMILY MEDICINE

## 2019-06-27 RX ORDER — MULTIVITAMIN
1 CAPSULE ORAL DAILY
Qty: 90 CAPSULE | Refills: 3 | Status: SHIPPED | OUTPATIENT
Start: 2019-06-27

## 2019-06-27 RX ORDER — HYDROCORTISONE ACETATE 25 MG/1
25 SUPPOSITORY RECTAL 2 TIMES DAILY PRN
Qty: 12 SUPPOSITORY | Refills: 0 | Status: SHIPPED | OUTPATIENT
Start: 2019-06-27 | End: 2019-10-04

## 2019-06-28 ENCOUNTER — TELEPHONE (OUTPATIENT)
Dept: FAMILY MEDICINE CLINIC | Facility: CLINIC | Age: 49
End: 2019-06-28

## 2019-06-28 NOTE — TELEPHONE ENCOUNTER
Needs prior auth   Ins[facility]: [employer] [employer]   Member id: [id number]  Ins contact#: [id number]

## 2019-07-01 ENCOUNTER — HOSPITAL ENCOUNTER (OUTPATIENT)
Dept: RADIOLOGY | Age: 49
Discharge: HOME | End: 2019-07-01
Payer: COMMERCIAL

## 2019-07-01 ENCOUNTER — ANESTHESIA EVENT (OUTPATIENT)
Dept: GASTROENTEROLOGY | Facility: HOSPITAL | Age: 49
End: 2019-07-01

## 2019-07-01 DIAGNOSIS — R10.9 FLANK PAIN: ICD-10-CM

## 2019-07-01 PROCEDURE — 76770 US EXAM ABDO BACK WALL COMP: CPT

## 2019-07-01 RX ORDER — SODIUM CHLORIDE 9 MG/ML
125 INJECTION, SOLUTION INTRAVENOUS CONTINUOUS
Status: CANCELLED | OUTPATIENT
Start: 2019-07-01

## 2019-07-02 ENCOUNTER — ANESTHESIA (OUTPATIENT)
Dept: GASTROENTEROLOGY | Facility: HOSPITAL | Age: 49
End: 2019-07-02

## 2019-07-02 ENCOUNTER — TELEPHONE (OUTPATIENT)
Dept: FAMILY MEDICINE CLINIC | Facility: CLINIC | Age: 49
End: 2019-07-02

## 2019-07-02 ENCOUNTER — HOSPITAL ENCOUNTER (OUTPATIENT)
Dept: GASTROENTEROLOGY | Facility: HOSPITAL | Age: 49
Setting detail: OUTPATIENT SURGERY
Discharge: HOME | End: 2019-07-02
Attending: SURGERY

## 2019-07-02 DIAGNOSIS — N13.39 OTHER HYDRONEPHROSIS: Primary | ICD-10-CM

## 2019-07-02 NOTE — TELEPHONE ENCOUNTER
Ashish from [facility] called, there are immediate findings on studies  Results in chart

## 2019-07-12 ENCOUNTER — HOSPITAL ENCOUNTER (OUTPATIENT)
Dept: RADIOLOGY | Age: 49
Discharge: HOME | End: 2019-07-12
Payer: COMMERCIAL

## 2019-07-12 DIAGNOSIS — N13.39 OTHER HYDRONEPHROSIS: ICD-10-CM

## 2019-07-12 PROCEDURE — 74176 CT ABD & PELVIS W/O CONTRAST: CPT

## 2019-07-18 ENCOUNTER — ANNUAL EXAM (OUTPATIENT)
Dept: OBGYN CLINIC | Facility: CLINIC | Age: 49
End: 2019-07-18
Payer: COMMERCIAL

## 2019-07-18 VITALS
DIASTOLIC BLOOD PRESSURE: 78 MMHG | SYSTOLIC BLOOD PRESSURE: 112 MMHG | WEIGHT: 142 LBS | BODY MASS INDEX: 24.24 KG/M2 | HEIGHT: 64 IN

## 2019-07-18 DIAGNOSIS — M54.50 ACUTE BILATERAL LOW BACK PAIN WITHOUT SCIATICA: ICD-10-CM

## 2019-07-18 DIAGNOSIS — Z01.419 GYNECOLOGIC EXAM NORMAL: Primary | ICD-10-CM

## 2019-07-18 DIAGNOSIS — R31.29 OTHER MICROSCOPIC HEMATURIA: ICD-10-CM

## 2019-07-18 LAB
SL AMB  POCT GLUCOSE, UA: NEGATIVE
SL AMB LEUKOCYTE ESTERASE,UA: ABNORMAL
SL AMB POCT BILIRUBIN,UA: NEGATIVE
SL AMB POCT BLOOD,UA: ABNORMAL
SL AMB POCT CLARITY,UA: CLEAR
SL AMB POCT COLOR,UA: YELLOW
SL AMB POCT KETONES,UA: NEGATIVE
SL AMB POCT NITRITE,UA: NEGATIVE
SL AMB POCT PH,UA: 6
SL AMB POCT SPECIFIC GRAVITY,UA: 1.02
SL AMB POCT URINE PROTEIN: NEGATIVE
SL AMB POCT UROBILINOGEN: NEGATIVE

## 2019-07-18 PROCEDURE — 81002 URINALYSIS NONAUTO W/O SCOPE: CPT | Performed by: PHYSICIAN ASSISTANT

## 2019-07-18 PROCEDURE — 99396 PREV VISIT EST AGE 40-64: CPT | Performed by: PHYSICIAN ASSISTANT

## 2019-07-18 NOTE — PROGRESS NOTES
Assessment/Plan   Problem List Items Addressed This Visit        Genitourinary    Other microscopic hematuria     Urine sent for culture  Office will call with results  Relevant Orders    GP Urine Culture (Completed)    POCT urine dip (Completed)       Other    Bilateral low back pain     Reviewed with patient, fibroids very small, likely not cause of her back discomfort  Recommend continue to follow up with PCP to evaluate cause of pain  Gynecologic exam normal - Primary     Pap guidelines reviewed  Pap with HPV done today  Reviewed irregular menses common in perimenopause  Reviewed 1 year no menses equals menopause  Can take Ibuprofen 600mg Q6hrs to help slow down menstrual flow  Continue to monitor menses  Call office if would like to discuss other options for menorrhagia  Return to office for annual or as needed  Relevant Orders    GP Liquid-Based Pap + HPV Plus (Completed)          Subjective:     Patient ID: Samson Denney is a 54 y o  y o  female  HPI  55 yo seen for annual exam  Menses irregular over the last 2 years, sometimes twice in 1 month other times skipping months  Reports 10 days long, heavy at times, typically only when she skips a month  Uses pads changing every 1-3 hrs  Denies bowel issues  Reports urinary frequency  Would like urine checked today  Vaginal symptoms she was previously having have cleared, reports less itching and irritation  Patient has been having lower back and pelvic pain  Pelvic ultrasound done on 6/21/2019 revealed fibroid uterus, increased from previous ultrasound: partially subserosal fibroid posteriorly measuring 1 9 x 2 2 x 2 0 cm, previously 1 7 x 2 1 x 1 9 cm, anterior intramural fibroid measuring 1 8 x 1 8 x 1 7 cm, not visualized previously  Uterus measures 5 8 x 6 1  X 9 1  Patient is currently being worked up by PCP for pain as well  Had recent renal ultrasound revealed right sided hydronephrosis and hydroureter   Follow up CT scan revealed no hydronephrosis but few nonspecific small sclerotic lesions in bony pelvis  Last pap: 6/17/2015 NILM (-)HRHPV  Last mammogram: 9/4/2018 BIRADS 1: Negative  The following portions of the patient's history were reviewed and updated as appropriate:   She  has a past medical history of Anemia, Dyspareunia in female, Fatigue, Hemorrhoids, Hypercholesterolemia, Hypothyroidism, Rectal bleeding, and Varicose veins of both legs with edema  She   Patient Active Problem List    Diagnosis Date Noted    Other microscopic hematuria 07/25/2019    Gynecologic exam normal 07/18/2019    Other hydronephrosis 07/02/2019    Pelvic pain 06/19/2019    Breast pain 06/19/2019    Irregular menses 06/19/2019    Menorrhagia with irregular cycle 06/19/2019    Bilateral low back pain 04/24/2019    Rectal bleeding 03/06/2019    Hemorrhoids 02/19/2019    Varicose veins of leg with swelling, bilateral 02/19/2019    Dysmenorrhea 08/16/2018    Encounter for annual health examination 03/16/2018    Joint pain 05/06/2016    Constipation 07/26/2013    Fatigue 01/11/2013    Hypothyroidism 01/11/2013    Iron deficiency anemia 01/11/2013     She  has a past surgical history that includes No past surgeries and Colonoscopy (2008)  Her family history includes No Known Problems in her father and mother  She  reports that she has never smoked  She has never used smokeless tobacco  She reports that she does not drink alcohol or use drugs    Current Outpatient Medications   Medication Sig Dispense Refill    cyclobenzaprine (FLEXERIL) 10 mg tablet Take 1 tablet (10 mg total) by mouth daily at bedtime as needed for muscle spasms (Patient not taking: Reported on 6/27/2019) 10 tablet 0    docusate sodium (COLACE) 100 mg capsule Take 1 capsule by mouth 2 (two) times a day as needed      ferrous sulfate 325 (65 Fe) mg tablet Take 1 tablet by mouth Daily      hydrocortisone (ANUSOL-HC) 25 mg suppository Insert 1 suppository (25 mg total) into the rectum 2 (two) times a day as needed for hemorrhoids 12 suppository 0    levothyroxine 175 mcg tablet Take 1 tablet (175 mcg total) by mouth daily 90 tablet 1    lidocaine (LIDODERM) 5 % Apply 1 patch topically daily Remove & Discard patch within 12 hours or as directed by MD 10 patch 0    loratadine (CLARITIN) 10 mg tablet Take 1 tablet (10 mg total) by mouth daily 90 tablet 1    Mefenamic Acid 250 MG CAPS Take 1 capsule (250 mg total) by mouth every 6 (six) hours as needed (menstrual cycle pain) (Patient not taking: Reported on 2019) 60 capsule 0    Multiple Vitamin (MULTIVITAMIN) capsule Take 1 capsule by mouth daily 90 capsule 3    pramoxine-hydrocortisone (ANALPRAM-HC) 1-1 % rectal cream Insert into the rectum 2 (two) times a day 30 g 0    sodium chloride (OCEAN) 0 65 % nasal spray 1 spray into each nostril as needed for congestion or rhinitis 30 mL 1     No current facility-administered medications for this visit  She is allergic to amoxicillin and sulfa antibiotics       Menstrual History:  OB History        2    Para   2    Term   2            AB        Living   2       SAB        TAB        Ectopic        Multiple        Live Births   2                Menarche age: 17  Patient's last menstrual period was 2019  Period Duration (Days): 10  Period Pattern: Regular  Menstrual Flow: Moderate      Review of Systems   Constitutional: Negative for fatigue, fever and unexpected weight change  HENT: Negative for dental problem and sinus pressure  Eyes: Negative for visual disturbance  Respiratory: Negative for cough, shortness of breath and wheezing  Cardiovascular: Negative for chest pain  Gastrointestinal: Negative for abdominal pain, blood in stool, constipation, diarrhea, nausea and vomiting  Endocrine: Negative for polydipsia  Genitourinary: Positive for menstrual problem and pelvic pain   Negative for difficulty urinating, dyspareunia, dysuria, frequency, hematuria and urgency  Musculoskeletal: Positive for back pain  Negative for arthralgias  Neurological: Negative for dizziness, seizures, light-headedness and headaches  Psychiatric/Behavioral: Negative for suicidal ideas  The patient is not nervous/anxious  Objective:  Vitals:    07/18/19 1045   BP: 112/78   BP Location: Left arm   Patient Position: Sitting   Cuff Size: Standard   Weight: 64 4 kg (142 lb)   Height: 5' 4" (1 626 m)      Physical Exam   Constitutional: She is oriented to person, place, and time  She appears well-developed and well-nourished  Genitourinary: Rectum normal, vagina normal and uterus normal  There is no rash, tenderness, lesion, injury or Bartholin's cyst on the right labia  There is no rash, tenderness, lesion, injury or Bartholin's cyst on the left labia  Vagina exhibits no lesion  No erythema, tenderness or bleeding in the vagina  No signs of injury around the vagina  No vaginal discharge found  Right adnexum does not display mass, does not display tenderness and does not display fullness  Left adnexum does not display mass, does not display tenderness and does not display fullness  Cervix does not exhibit motion tenderness, lesion or discharge  Uterus is not enlarged, tender, exhibiting a mass, irregular (is regular) or mobile  Rectal exam shows no external hemorrhoid, no internal hemorrhoid, no fissure, no mass, no tenderness, anal tone normal and guaiac negative stool  HENT:   Head: Normocephalic and atraumatic  Neck: No thyromegaly present  Cardiovascular: Normal rate, regular rhythm and normal heart sounds  Exam reveals no gallop and no friction rub  No murmur heard  Pulmonary/Chest: Effort normal and breath sounds normal  No respiratory distress  She has no wheezes  Right breast exhibits no inverted nipple, no mass, no nipple discharge, no skin change and no tenderness   Left breast exhibits no inverted nipple, no mass, no nipple discharge, no skin change and no tenderness  No breast swelling, tenderness, discharge or bleeding  Breasts are symmetrical    Abdominal: Soft  She exhibits no distension and no mass  There is no tenderness  There is no rebound and no guarding  No hernia  Lymphadenopathy:     She has no cervical adenopathy  Right: No inguinal adenopathy present  Left: No inguinal adenopathy present  Neurological: She is alert and oriented to person, place, and time  Skin: Skin is warm and dry  Psychiatric: She has a normal mood and affect  Her behavior is normal    Urine dip 3+ leukocytes, trace blood  Otherwise negative

## 2019-07-20 LAB — BACTERIA UR CULT: NO GROWTH

## 2019-07-23 ENCOUNTER — TELEPHONE (OUTPATIENT)
Dept: FAMILY MEDICINE CLINIC | Facility: CLINIC | Age: 49
End: 2019-07-23

## 2019-07-23 NOTE — TELEPHONE ENCOUNTER
Discussed CT renal results with patient  Patient is asymptomatic at this time  She did have pap smear but results are pending  Advised patient to schedule for colonoscopy  Mammogram last year was normal   If there is any abnormality in pap smear or colonoscopy, will do whole body scan for further characterization of non specific small sclerotic rounded lesions in bony pelvis  Patient reports understanding and agrees with above plan

## 2019-07-24 LAB
HPV HR 12 DNA CVX QL NAA+PROBE: NOT DETECTED
HPV16 DNA SPEC QL NAA+PROBE: NOT DETECTED
HPV18 DNA SPEC QL NAA+PROBE: NOT DETECTED
THIN PREP CVX: NORMAL

## 2019-07-25 PROBLEM — R31.29 OTHER MICROSCOPIC HEMATURIA: Status: ACTIVE | Noted: 2019-07-25

## 2019-07-25 NOTE — ASSESSMENT & PLAN NOTE
Reviewed with patient, fibroids very small, likely not cause of her back discomfort  Recommend continue to follow up with PCP to evaluate cause of pain

## 2019-07-25 NOTE — ASSESSMENT & PLAN NOTE
Pap guidelines reviewed  Pap with HPV done today  Reviewed irregular menses common in perimenopause  Reviewed 1 year no menses equals menopause  Can take Ibuprofen 600mg Q6hrs to help slow down menstrual flow  Continue to monitor menses  Call office if would like to discuss other options for menorrhagia  Return to office for annual or as needed

## 2019-10-04 ENCOUNTER — OFFICE VISIT (OUTPATIENT)
Dept: FAMILY MEDICINE CLINIC | Facility: CLINIC | Age: 49
End: 2019-10-04

## 2019-10-04 VITALS
BODY MASS INDEX: 24.58 KG/M2 | RESPIRATION RATE: 16 BRPM | TEMPERATURE: 97.5 F | SYSTOLIC BLOOD PRESSURE: 118 MMHG | DIASTOLIC BLOOD PRESSURE: 70 MMHG | WEIGHT: 143.2 LBS | HEART RATE: 68 BPM

## 2019-10-04 DIAGNOSIS — N76.0 ACUTE VAGINITIS: Primary | ICD-10-CM

## 2019-10-04 DIAGNOSIS — M54.50 ACUTE BILATERAL LOW BACK PAIN WITHOUT SCIATICA: ICD-10-CM

## 2019-10-04 LAB
SL AMB  POCT GLUCOSE, UA: NEGATIVE
SL AMB LEUKOCYTE ESTERASE,UA: NEGATIVE
SL AMB POCT BILIRUBIN,UA: NEGATIVE
SL AMB POCT BLOOD,UA: ABNORMAL
SL AMB POCT CLARITY,UA: CLEAR
SL AMB POCT COLOR,UA: YELLOW
SL AMB POCT KETONES,UA: NEGATIVE
SL AMB POCT NITRITE,UA: NEGATIVE
SL AMB POCT PH,UA: 6
SL AMB POCT SPECIFIC GRAVITY,UA: 1.01
SL AMB POCT URINE HCG: NEGATIVE
SL AMB POCT URINE PROTEIN: NEGATIVE
SL AMB POCT UROBILINOGEN: 0.2

## 2019-10-04 PROCEDURE — 99213 OFFICE O/P EST LOW 20 MIN: CPT | Performed by: FAMILY MEDICINE

## 2019-10-04 PROCEDURE — 81002 URINALYSIS NONAUTO W/O SCOPE: CPT | Performed by: FAMILY MEDICINE

## 2019-10-04 PROCEDURE — 81001 URINALYSIS AUTO W/SCOPE: CPT | Performed by: FAMILY MEDICINE

## 2019-10-04 PROCEDURE — 81025 URINE PREGNANCY TEST: CPT | Performed by: FAMILY MEDICINE

## 2019-10-04 RX ORDER — METRONIDAZOLE 500 MG/1
500 TABLET ORAL EVERY 12 HOURS SCHEDULED
Qty: 14 TABLET | Refills: 0 | Status: SHIPPED | OUTPATIENT
Start: 2019-10-04 | End: 2019-10-11

## 2019-10-04 NOTE — ASSESSMENT & PLAN NOTE
Pt reports vaginal discharge onset of 10 days  Discharge is white, non offensive, similar to previous symptoms 3 months ago when she was dx with BV  Pt declined SSE with slides  UPT- negative  UA unremarkable except trace rbcs, will order UA with reflex with Cx    - will treat empirically for BV  - Discussed the need for SSE with slides if Sx does not improve on medication   - RTC precautions discussed

## 2019-10-04 NOTE — ASSESSMENT & PLAN NOTE
Likely MSK in origin with no red flags    - NSAIDs for pain/discomfort  - Muscle relaxant ( Flexeril 10 mg qHs)  - Apply Ice/Heat for comfort  - Pt amenable to OMT if symptoms not improved with the above  - RTC precautions reviewed

## 2019-10-04 NOTE — PROGRESS NOTES
Assessment/Plan:       Problem List Items Addressed This Visit        Genitourinary    Acute vaginitis - Primary     Pt reports vaginal discharge onset of 10 days  Discharge is white, non offensive, similar to previous symptoms 3 months ago when she was dx with BV  Pt declined SSE with slides( KOH and wet mount)  UPT- negative  UA unremarkable except trace rbcs, will order UA with reflex with Cx    - will treat empirically for BV  - Discussed the need for SSE with slides if Sx does not improve on medication   - RTC precautions discussed         Relevant Medications    metroNIDAZOLE (FLAGYL) 500 mg tablet    Other Relevant Orders    POCT urine dip (Completed)    POCT urine HCG (Completed)    UA w Reflex to Microscopic w Reflex to Culture - Clinic Collect       Other    Bilateral low back pain     Likely MSK in origin with no red flags    - NSAIDs for pain/discomfort  - Muscle relaxant ( Flexeril 10 mg qHs)  - Apply Ice/Heat for comfort  - Pt amenable to OMT if symptoms not improved with the above  - RTC precautions reviewed                       Subjective:      Patient ID: Kelsy Wallace is a 54 y o  female  Back Pain   This is a new problem  Episode onset: 10 days  The problem occurs intermittently  The problem has been gradually worsening since onset  The pain is present in the lumbar spine  The quality of the pain is described as aching  Radiates to: posterior lower extremity bilateral  The pain is at a severity of 2/10  The pain is mild  The pain is worse during the night  Exacerbated by: nothing  Stiffness is present in the morning  Pertinent negatives include no bladder incontinence, dysuria, fever, headaches, numbness, paresis, paresthesias or tingling  Risk factors: Pt reports she has not had a period in 2 months  She has tried NSAIDs for the symptoms  The treatment provided significant relief  Vaginal Discharge   The patient's primary symptoms include vaginal discharge  This is a new (10 days) problem  The problem occurs constantly  The problem has been unchanged  The patient is experiencing no pain  She is not pregnant  Associated symptoms include back pain  Pertinent negatives include no dysuria, fever or headaches  The vaginal discharge was watery  There has been no bleeding  She has not been passing clots  She has not been passing tissue  She has tried nothing for the symptoms  She is sexually active  No, her partner does not have an STD  Contraceptive use: Condoms  Menstrual history: No periods in 2 months  Pt reports she had the same Sx 3 montsh ago nad was treatted here  Per chart review pt was treated for BV  The following portions of the patient's history were reviewed and updated as appropriate: allergies, current medications, past family history, past medical history, past social history, past surgical history and problem list     Review of Systems   Constitutional: Negative for fever  Genitourinary: Positive for vaginal discharge  Negative for bladder incontinence and dysuria  Musculoskeletal: Positive for back pain  Neurological: Negative for tingling, numbness, headaches and paresthesias  Objective:      /70 (BP Location: Left arm, Patient Position: Sitting, Cuff Size: Standard)   Pulse 68   Temp 97 5 °F (36 4 °C) (Tympanic)   Resp 16   Wt 65 kg (143 lb 3 2 oz)   BMI 24 58 kg/m²          Physical Exam   Constitutional: She appears well-developed and well-nourished  HENT:   Head: Normocephalic and atraumatic  Neck: Normal range of motion  Cardiovascular: Normal rate, regular rhythm and normal heart sounds  Pulmonary/Chest: Effort normal and breath sounds normal    Abdominal: Soft  She exhibits no distension  There is tenderness (LLq Pain)  Musculoskeletal: Normal range of motion  Neurological: She is alert  Skin: Skin is warm and dry  Psychiatric: She has a normal mood and affect  Vitals reviewed

## 2019-10-07 LAB
BACTERIA UR QL AUTO: ABNORMAL /HPF
BILIRUB UR QL STRIP: NEGATIVE
CLARITY UR: CLEAR
COLOR UR: YELLOW
GLUCOSE UR STRIP-MCNC: NEGATIVE MG/DL
HGB UR QL STRIP.AUTO: NEGATIVE
HYALINE CASTS #/AREA URNS LPF: ABNORMAL /LPF
KETONES UR STRIP-MCNC: NEGATIVE MG/DL
LEUKOCYTE ESTERASE UR QL STRIP: ABNORMAL
NITRITE UR QL STRIP: NEGATIVE
NON-SQ EPI CELLS URNS QL MICRO: ABNORMAL /HPF
PH UR STRIP.AUTO: 6 [PH]
PROT UR STRIP-MCNC: NEGATIVE MG/DL
RBC #/AREA URNS AUTO: ABNORMAL /HPF
SP GR UR STRIP.AUTO: 1.01 (ref 1–1.03)
UROBILINOGEN UR QL STRIP.AUTO: 0.2 E.U./DL
WBC #/AREA URNS AUTO: ABNORMAL /HPF

## 2019-10-22 ENCOUNTER — OFFICE VISIT (OUTPATIENT)
Dept: SURGERY | Facility: CLINIC | Age: 49
End: 2019-10-22

## 2019-10-22 VITALS
HEIGHT: 64 IN | TEMPERATURE: 97.9 F | RESPIRATION RATE: 18 BRPM | DIASTOLIC BLOOD PRESSURE: 74 MMHG | HEART RATE: 67 BPM | BODY MASS INDEX: 24.24 KG/M2 | SYSTOLIC BLOOD PRESSURE: 118 MMHG | WEIGHT: 142 LBS

## 2019-10-22 DIAGNOSIS — K62.5 RECTAL BLEEDING: Primary | ICD-10-CM

## 2019-10-22 PROCEDURE — PREOP: Performed by: PHYSICIAN ASSISTANT

## 2019-10-22 NOTE — PROGRESS NOTES
Assessment/Plan:   Stiven Ernst is a 54 y  o female who is here for a:     Diagnostic  Colonoscopy  Plan: Colonoscopy for: rectal bleeding which patient belives is related to hemorrhoids and constipation  Previous Colonoscopy and  Location of polyps if any:   10 years ago and  with polyps in the :   unknown           Preoperative Clearance: None        ______________________________________________________    HPI:  Stiven Ernst is a 54 y  o female who was referred for evaluation of    Diagnostic   Currently:     Symptoms include:  positive for - rectal bleeding related to constipation and hard stool  Patient with history of anemia and takes iron pills  negative for - appetite loss, diarrhea, gas/bloating, heartburn, hematemesis, nausea/vomiting or stool incontinence            Family history of colon cancer: None reported             Anticoagulation: none    LABS:      Lab Results   Component Value Date    WBC 6 26 02/27/2019    HGB 10 3 (L) 02/27/2019    HCT 35 4 02/27/2019    MCV 77 (L) 02/27/2019     02/27/2019     Lab Results   Component Value Date     06/22/2015    K 3 9 08/16/2018     08/16/2018    CO2 29 08/16/2018    ANIONGAP 7 06/22/2015    BUN 12 08/16/2018    CREATININE 0 75 08/16/2018    GLUCOSE 90 06/22/2015    GLUF 88 08/16/2018    CALCIUM 8 6 08/16/2018    AST 18 06/22/2015    ALT 16 06/22/2015    ALKPHOS 51 06/22/2015    PROT 8 3 (H) 06/22/2015    BILITOT 0 2 06/22/2015    EGFR 96 08/16/2018     No results found for: HGBA1C  No results found for: INR, PROTIME      No orders to display           ROS:  General ROS: negative for - chills, fatigue, fever or night sweats, weight loss  Respiratory ROS: no cough, shortness of breath, or wheezing  Cardiovascular ROS: no chest pain or dyspnea on exertion  Genito-Urinary ROS: no dysuria, trouble voiding, or hematuria  Musculoskeletal ROS: negative for - gait disturbance, joint pain or muscle pain  Neurological ROS: no TIA or stroke symptoms  GI ROS: see HPI  Skin ROS: no new rashes or lesions   Lymphatic ROS: no new adenopathy noted by pt  GYN ROS: see HPI, no new GYN history or bleeding noted  Psy ROS: no new mental or behavioral disturbances           Imaging: No new pertinent imaging studies           Patient Active Problem List   Diagnosis    Constipation    Fatigue    Hypothyroidism    Iron deficiency anemia    Joint pain    Encounter for annual health examination    Dysmenorrhea    Hemorrhoids    Varicose veins of leg with swelling, bilateral    Rectal bleeding    Bilateral low back pain    Pelvic pain    Breast pain    Irregular menses    Menorrhagia with irregular cycle    Other hydronephrosis    Gynecologic exam normal    Other microscopic hematuria    Acute vaginitis         Allergies:  Amoxicillin and Sulfa antibiotics      Current Outpatient Medications:     cyclobenzaprine (FLEXERIL) 10 mg tablet, Take 1 tablet (10 mg total) by mouth daily at bedtime as needed for muscle spasms, Disp: 10 tablet, Rfl: 0    docusate sodium (COLACE) 100 mg capsule, Take 1 capsule by mouth 2 (two) times a day as needed, Disp: , Rfl:     ferrous sulfate 325 (65 Fe) mg tablet, Take 1 tablet by mouth Daily, Disp: , Rfl:     levothyroxine 175 mcg tablet, Take 1 tablet (175 mcg total) by mouth daily, Disp: 90 tablet, Rfl: 1    loratadine (CLARITIN) 10 mg tablet, Take 1 tablet (10 mg total) by mouth daily, Disp: 90 tablet, Rfl: 1    Multiple Vitamin (MULTIVITAMIN) capsule, Take 1 capsule by mouth daily, Disp: 90 capsule, Rfl: 3    sodium chloride (OCEAN) 0 65 % nasal spray, 1 spray into each nostril as needed for congestion or rhinitis, Disp: 30 mL, Rfl: 1    Past Medical History:   Diagnosis Date    Anemia     Dyspareunia in female     Fatigue     Hemorrhoids     Hypercholesterolemia     Hypothyroidism     Rectal bleeding     Varicose veins of both legs with edema        Past Surgical History:   Procedure Laterality Date    COLONOSCOPY  2008    NO PAST SURGERIES         Family History   Problem Relation Age of Onset    No Known Problems Mother     No Known Problems Father        Social History     Socioeconomic History    Marital status: /Civil Union     Spouse name: None    Number of children: None    Years of education: None    Highest education level: None   Occupational History    None   Social Needs    Financial resource strain: None    Food insecurity:     Worry: None     Inability: None    Transportation needs:     Medical: None     Non-medical: None   Tobacco Use    Smoking status: Never Smoker    Smokeless tobacco: Never Used   Substance and Sexual Activity    Alcohol use: No    Drug use: No    Sexual activity: Yes     Partners: Male     Birth control/protection: Condom Male   Lifestyle    Physical activity:     Days per week: None     Minutes per session: None    Stress: None   Relationships    Social connections:     Talks on phone: None     Gets together: None     Attends [employer] service: None     Active member of club or organization: None     Attends meetings of clubs or organizations: None     Relationship status: None    Intimate partner violence:     Fear of current or ex partner: None     Emotionally abused: None     Physically abused: None     Forced sexual activity: None   Other Topics Concern    None   Social History Narrative    None       Exam:   Vitals:    10/22/19 0948   BP: 118/74   Pulse: 67   Resp: 18   Temp: 97 9 °F (36 6 °C)           ______________________________________________________    PHYSICAL EXAM  General Appearance:    Alert, cooperative, no distress, healthy     Head:    Normocephalic without obvious abnormality   Eyes:    PERRL, conjunctiva/corneas clear, EOM's intact        Neck:   Supple, no adenopathy, no JVD   Back:     Symmetric, no spinal or CVA tenderness   Lungs:     Clear to auscultation bilaterally, no wheezing or rhonchi   Heart: Regular rate and rhythm, S1 and S2 normal, no murmur   Abdomen:     Soft, NTND, +BS   Extremities:   Extremities normal  No clubbing, cyanosis or edema   Psych:   Normal Affect   Neurologic:   CNII-XII intact  Strength symmetric, speech intact                 Aris Talley PA-C  Date: 10/22/2019 Time: 10:32 AM       This report has been generated by a voice recognition software system  Therefore, there may be syntax, spelling, and/or grammatical errors  Please call if you've any questions  This  encounter has been billed by a non certified Coder  Informed consent for procedure was personally discussed, reviewed, and signed by Dr Glenn Hutchison  Discussion by Dr Glenn Hutchison was carried out regarding risks, benefits, and alternatives with the patient  Risks include but are not limited to:  bleeding, infection, and delayed wound healing or an open wound, pulmonary embolus, leaks from bowel or bile ducts or other viscus, transfusions, death  Discussed in further detail the more common complications and their rates of occurrence   was used if necessary  Patient expressed understanding of the issues discussed and wished/consented to proceed  All questions were answered by Dr Glenn Huthcison  Discussion performed between patient and the provider signing below  Signature:   Cody Schuler  Date: 10/22/2019 Time: 10:32 AM                                                                                                                                        Informed consent for procedure was personally discussed, reviewed, and signed by Dr Glenn Hutchison  Discussion by Dr Glenn Hutchison was carried out regarding risks, benefits, and alternatives with the patient  Risks include but are not limited to:  bleeding, infection, and delayed wound healing or an open wound, pulmonary embolus, leaks from bowel or bile ducts or other viscus, transfusions, death    Discussed in further detail the more common complications and their rates of occurrence   was used if necessary  Patient expressed understanding of the issues discussed and wished/consented to proceed  All questions were answered by Dr Dianne Reynoso  Discussion performed between patient and the provider signing below  Signature:   Stella Torres    Date: 10/22/2019 Time: 10:32 AM           Some portions of this record may have been generated with voice recognition software  There may be translation, syntax,  or grammatical errors  Occasional wrong word or "sound-a-like" substitutions may have occurred due to the inherent limitations of the voice recognition software  Read the chart carefully and recognize, using context, where substitutions may have occurred  If you have any questions, please contact the dictating provider for clarification or correction, as needed  This encounter has been coded by a non-certified coder

## 2019-10-22 NOTE — H&P (VIEW-ONLY)
Assessment/Plan:   Iliana Jefferson is a 54 y  o female who is here for a:     Diagnostic  Colonoscopy  Plan: Colonoscopy for: rectal bleeding which patient belives is related to hemorrhoids and constipation  Previous Colonoscopy and  Location of polyps if any:   10 years ago and  with polyps in the :   unknown           Preoperative Clearance: None        ______________________________________________________    HPI:  Iliana Jefferson is a 54 y  o female who was referred for evaluation of    Diagnostic   Currently:     Symptoms include:  positive for - rectal bleeding related to constipation and hard stool  Patient with history of anemia and takes iron pills  negative for - appetite loss, diarrhea, gas/bloating, heartburn, hematemesis, nausea/vomiting or stool incontinence            Family history of colon cancer: None reported             Anticoagulation: none    LABS:      Lab Results   Component Value Date    WBC 6 26 02/27/2019    HGB 10 3 (L) 02/27/2019    HCT 35 4 02/27/2019    MCV 77 (L) 02/27/2019     02/27/2019     Lab Results   Component Value Date     06/22/2015    K 3 9 08/16/2018     08/16/2018    CO2 29 08/16/2018    ANIONGAP 7 06/22/2015    BUN 12 08/16/2018    CREATININE 0 75 08/16/2018    GLUCOSE 90 06/22/2015    GLUF 88 08/16/2018    CALCIUM 8 6 08/16/2018    AST 18 06/22/2015    ALT 16 06/22/2015    ALKPHOS 51 06/22/2015    PROT 8 3 (H) 06/22/2015    BILITOT 0 2 06/22/2015    EGFR 96 08/16/2018     No results found for: HGBA1C  No results found for: INR, PROTIME      No orders to display           ROS:  General ROS: negative for - chills, fatigue, fever or night sweats, weight loss  Respiratory ROS: no cough, shortness of breath, or wheezing  Cardiovascular ROS: no chest pain or dyspnea on exertion  Genito-Urinary ROS: no dysuria, trouble voiding, or hematuria  Musculoskeletal ROS: negative for - gait disturbance, joint pain or muscle pain  Neurological ROS: no TIA or stroke symptoms  GI ROS: see HPI  Skin ROS: no new rashes or lesions   Lymphatic ROS: no new adenopathy noted by pt  GYN ROS: see HPI, no new GYN history or bleeding noted  Psy ROS: no new mental or behavioral disturbances           Imaging: No new pertinent imaging studies           Patient Active Problem List   Diagnosis    Constipation    Fatigue    Hypothyroidism    Iron deficiency anemia    Joint pain    Encounter for annual health examination    Dysmenorrhea    Hemorrhoids    Varicose veins of leg with swelling, bilateral    Rectal bleeding    Bilateral low back pain    Pelvic pain    Breast pain    Irregular menses    Menorrhagia with irregular cycle    Other hydronephrosis    Gynecologic exam normal    Other microscopic hematuria    Acute vaginitis         Allergies:  Amoxicillin and Sulfa antibiotics      Current Outpatient Medications:     cyclobenzaprine (FLEXERIL) 10 mg tablet, Take 1 tablet (10 mg total) by mouth daily at bedtime as needed for muscle spasms, Disp: 10 tablet, Rfl: 0    docusate sodium (COLACE) 100 mg capsule, Take 1 capsule by mouth 2 (two) times a day as needed, Disp: , Rfl:     ferrous sulfate 325 (65 Fe) mg tablet, Take 1 tablet by mouth Daily, Disp: , Rfl:     levothyroxine 175 mcg tablet, Take 1 tablet (175 mcg total) by mouth daily, Disp: 90 tablet, Rfl: 1    loratadine (CLARITIN) 10 mg tablet, Take 1 tablet (10 mg total) by mouth daily, Disp: 90 tablet, Rfl: 1    Multiple Vitamin (MULTIVITAMIN) capsule, Take 1 capsule by mouth daily, Disp: 90 capsule, Rfl: 3    sodium chloride (OCEAN) 0 65 % nasal spray, 1 spray into each nostril as needed for congestion or rhinitis, Disp: 30 mL, Rfl: 1    Past Medical History:   Diagnosis Date    Anemia     Dyspareunia in female     Fatigue     Hemorrhoids     Hypercholesterolemia     Hypothyroidism     Rectal bleeding     Varicose veins of both legs with edema        Past Surgical History:   Procedure Laterality Date    COLONOSCOPY  2008    NO PAST SURGERIES         Family History   Problem Relation Age of Onset    No Known Problems Mother     No Known Problems Father        Social History     Socioeconomic History    Marital status: /Civil Union     Spouse name: None    Number of children: None    Years of education: None    Highest education level: None   Occupational History    None   Social Needs    Financial resource strain: None    Food insecurity:     Worry: None     Inability: None    Transportation needs:     Medical: None     Non-medical: None   Tobacco Use    Smoking status: Never Smoker    Smokeless tobacco: Never Used   Substance and Sexual Activity    Alcohol use: No    Drug use: No    Sexual activity: Yes     Partners: Male     Birth control/protection: Condom Male   Lifestyle    Physical activity:     Days per week: None     Minutes per session: None    Stress: None   Relationships    Social connections:     Talks on phone: None     Gets together: None     Attends Latter-[employer] service: None     Active member of club or organization: None     Attends meetings of clubs or organizations: None     Relationship status: None    Intimate partner violence:     Fear of current or ex partner: None     Emotionally abused: None     Physically abused: None     Forced sexual activity: None   Other Topics Concern    None   Social History Narrative    None       Exam:   Vitals:    10/22/19 0948   BP: 118/74   Pulse: 67   Resp: 18   Temp: 97 9 °F (36 6 °C)           ______________________________________________________    PHYSICAL EXAM  General Appearance:    Alert, cooperative, no distress, healthy     Head:    Normocephalic without obvious abnormality   Eyes:    PERRL, conjunctiva/corneas clear, EOM's intact        Neck:   Supple, no adenopathy, no JVD   Back:     Symmetric, no spinal or CVA tenderness   Lungs:     Clear to auscultation bilaterally, no wheezing or rhonchi   Heart: Regular rate and rhythm, S1 and S2 normal, no murmur   Abdomen:     Soft, NTND, +BS   Extremities:   Extremities normal  No clubbing, cyanosis or edema   Psych:   Normal Affect   Neurologic:   CNII-XII intact  Strength symmetric, speech intact                 Onofre Alonso PA-C  Date: 10/22/2019 Time: 10:32 AM       This report has been generated by a voice recognition software system  Therefore, there may be syntax, spelling, and/or grammatical errors  Please call if you've any questions  This  encounter has been billed by a non certified Coder  Informed consent for procedure was personally discussed, reviewed, and signed by Dr Suresh Gomez  Discussion by Dr Suresh Gomez was carried out regarding risks, benefits, and alternatives with the patient  Risks include but are not limited to:  bleeding, infection, and delayed wound healing or an open wound, pulmonary embolus, leaks from bowel or bile ducts or other viscus, transfusions, death  Discussed in further detail the more common complications and their rates of occurrence   was used if necessary  Patient expressed understanding of the issues discussed and wished/consented to proceed  All questions were answered by Dr Suresh Gomez  Discussion performed between patient and the provider signing below  Signature:   Barrett Yoo  Date: 10/22/2019 Time: 10:32 AM                                                                                                                                        Informed consent for procedure was personally discussed, reviewed, and signed by Dr Suresh Gomez  Discussion by Dr Suresh Gomez was carried out regarding risks, benefits, and alternatives with the patient  Risks include but are not limited to:  bleeding, infection, and delayed wound healing or an open wound, pulmonary embolus, leaks from bowel or bile ducts or other viscus, transfusions, death    Discussed in further detail the more common complications and their rates of occurrence   was used if necessary  Patient expressed understanding of the issues discussed and wished/consented to proceed  All questions were answered by Dr Zoe Gallegos  Discussion performed between patient and the provider signing below  Signature:   Fabi Alexander    Date: 10/22/2019 Time: 10:32 AM           Some portions of this record may have been generated with voice recognition software  There may be translation, syntax,  or grammatical errors  Occasional wrong word or "sound-a-like" substitutions may have occurred due to the inherent limitations of the voice recognition software  Read the chart carefully and recognize, using context, where substitutions may have occurred  If you have any questions, please contact the dictating provider for clarification or correction, as needed  This encounter has been coded by a non-certified coder

## 2019-10-24 ENCOUNTER — TELEPHONE (OUTPATIENT)
Dept: FAMILY MEDICINE CLINIC | Facility: CLINIC | Age: 49
End: 2019-10-24

## 2019-10-24 DIAGNOSIS — E03.9 HYPOTHYROIDISM, UNSPECIFIED TYPE: ICD-10-CM

## 2019-10-24 DIAGNOSIS — E03.9 HYPOTHYROIDISM, UNSPECIFIED TYPE: Primary | ICD-10-CM

## 2019-10-24 RX ORDER — LEVOTHYROXINE SODIUM 175 UG/1
175 TABLET ORAL DAILY
Qty: 90 TABLET | Refills: 1 | Status: SHIPPED | OUTPATIENT
Start: 2019-10-24 | End: 2019-10-30 | Stop reason: ALTCHOICE

## 2019-10-24 RX ORDER — MULTIVITAMIN
1 TABLET ORAL DAILY
Refills: 3 | COMMUNITY
Start: 2019-07-26 | End: 2019-10-28

## 2019-10-24 NOTE — TELEPHONE ENCOUNTER
Please call patient to schedule follow up with Dr Teddy Mcfarlane for thyroid in December  Tell her lab order being sent in mail to have done prior to visit  Thanks!

## 2019-10-28 ENCOUNTER — OFFICE VISIT (OUTPATIENT)
Dept: FAMILY MEDICINE CLINIC | Facility: CLINIC | Age: 49
End: 2019-10-28

## 2019-10-28 VITALS
BODY MASS INDEX: 23.53 KG/M2 | HEIGHT: 65 IN | SYSTOLIC BLOOD PRESSURE: 120 MMHG | TEMPERATURE: 98 F | WEIGHT: 141.2 LBS | RESPIRATION RATE: 18 BRPM | HEART RATE: 78 BPM | DIASTOLIC BLOOD PRESSURE: 70 MMHG

## 2019-10-28 DIAGNOSIS — Z23 ENCOUNTER FOR IMMUNIZATION: ICD-10-CM

## 2019-10-28 DIAGNOSIS — E03.9 HYPOTHYROIDISM, UNSPECIFIED TYPE: ICD-10-CM

## 2019-10-28 DIAGNOSIS — R20.9 ABNORMAL SENSATION OF LOWER EXTREMITY: ICD-10-CM

## 2019-10-28 DIAGNOSIS — G89.29 CHRONIC BILATERAL LOW BACK PAIN WITHOUT SCIATICA: Primary | ICD-10-CM

## 2019-10-28 DIAGNOSIS — M54.50 CHRONIC BILATERAL LOW BACK PAIN WITHOUT SCIATICA: Primary | ICD-10-CM

## 2019-10-28 DIAGNOSIS — D50.9 IRON DEFICIENCY ANEMIA, UNSPECIFIED IRON DEFICIENCY ANEMIA TYPE: ICD-10-CM

## 2019-10-28 DIAGNOSIS — Z12.31 ENCOUNTER FOR SCREENING MAMMOGRAM FOR BREAST CANCER: ICD-10-CM

## 2019-10-28 PROBLEM — R10.2 PELVIC PAIN: Status: RESOLVED | Noted: 2019-06-19 | Resolved: 2019-10-28

## 2019-10-28 PROBLEM — N64.4 BREAST PAIN: Status: RESOLVED | Noted: 2019-06-19 | Resolved: 2019-10-28

## 2019-10-28 PROBLEM — Z00.00 ENCOUNTER FOR ANNUAL HEALTH EXAMINATION: Status: RESOLVED | Noted: 2018-03-16 | Resolved: 2019-10-28

## 2019-10-28 PROBLEM — N76.0 ACUTE VAGINITIS: Status: RESOLVED | Noted: 2019-10-04 | Resolved: 2019-10-28

## 2019-10-28 PROBLEM — R31.29 OTHER MICROSCOPIC HEMATURIA: Status: RESOLVED | Noted: 2019-07-25 | Resolved: 2019-10-28

## 2019-10-28 PROCEDURE — 99214 OFFICE O/P EST MOD 30 MIN: CPT | Performed by: FAMILY MEDICINE

## 2019-10-30 ENCOUNTER — LAB (OUTPATIENT)
Dept: LAB | Facility: CLINIC | Age: 49
End: 2019-10-30
Payer: COMMERCIAL

## 2019-10-30 DIAGNOSIS — D50.9 IRON DEFICIENCY ANEMIA, UNSPECIFIED IRON DEFICIENCY ANEMIA TYPE: ICD-10-CM

## 2019-10-30 DIAGNOSIS — E03.9 HYPOTHYROIDISM, UNSPECIFIED TYPE: Primary | ICD-10-CM

## 2019-10-30 DIAGNOSIS — R20.9 ABNORMAL SENSATION OF LOWER EXTREMITY: ICD-10-CM

## 2019-10-30 DIAGNOSIS — E03.9 HYPOTHYROIDISM, UNSPECIFIED TYPE: ICD-10-CM

## 2019-10-30 LAB
ERYTHROCYTE [DISTWIDTH] IN BLOOD BY AUTOMATED COUNT: 15.2 % (ref 11.6–15.1)
HCT VFR BLD AUTO: 38.6 % (ref 34.8–46.1)
HGB BLD-MCNC: 11.9 G/DL (ref 11.5–15.4)
MCH RBC QN AUTO: 26.3 PG (ref 26.8–34.3)
MCHC RBC AUTO-ENTMCNC: 30.8 G/DL (ref 31.4–37.4)
MCV RBC AUTO: 85 FL (ref 82–98)
PLATELET # BLD AUTO: 294 THOUSANDS/UL (ref 149–390)
PMV BLD AUTO: 9.6 FL (ref 8.9–12.7)
RBC # BLD AUTO: 4.52 MILLION/UL (ref 3.81–5.12)
T4 FREE SERPL-MCNC: 1.44 NG/DL (ref 0.76–1.46)
TSH SERPL DL<=0.05 MIU/L-ACNC: 0.14 UIU/ML (ref 0.36–3.74)
VIT B12 SERPL-MCNC: 615 PG/ML (ref 100–900)
WBC # BLD AUTO: 5.51 THOUSAND/UL (ref 4.31–10.16)

## 2019-10-30 PROCEDURE — 82607 VITAMIN B-12: CPT

## 2019-10-30 PROCEDURE — 36415 COLL VENOUS BLD VENIPUNCTURE: CPT

## 2019-10-30 PROCEDURE — 84443 ASSAY THYROID STIM HORMONE: CPT

## 2019-10-30 PROCEDURE — 84439 ASSAY OF FREE THYROXINE: CPT

## 2019-10-30 PROCEDURE — 85027 COMPLETE CBC AUTOMATED: CPT

## 2019-10-30 RX ORDER — LEVOTHYROXINE SODIUM 0.15 MG/1
150 TABLET ORAL
Qty: 60 TABLET | Refills: 2 | Status: SHIPPED | OUTPATIENT
Start: 2019-10-30 | End: 2020-04-02 | Stop reason: SDUPTHER

## 2019-10-30 NOTE — RESULT ENCOUNTER NOTE
Discussed blood work results with patient  TSH low, will reduce levothyroxine dose from 175 mcg to 150 mcg, repeat level in 6 weeks, TSH ordered  Vitamin b12 normal and hb improved, continue iron daily

## 2019-11-13 ENCOUNTER — TELEPHONE (OUTPATIENT)
Dept: SURGERY | Facility: CLINIC | Age: 49
End: 2019-11-13

## 2019-11-13 NOTE — TELEPHONE ENCOUNTER
Tried reaching patient, no answer  Left message on voicemail for patient to return call  Checking to see if patient has prep and ride for 11/15/19 colonoscopy  Also does she have any questions

## 2019-11-14 ENCOUNTER — OFFICE VISIT (OUTPATIENT)
Dept: FAMILY MEDICINE CLINIC | Facility: CLINIC | Age: 49
End: 2019-11-14

## 2019-11-14 ENCOUNTER — TELEPHONE (OUTPATIENT)
Dept: GASTROENTEROLOGY | Facility: HOSPITAL | Age: 49
End: 2019-11-14

## 2019-11-14 ENCOUNTER — ANESTHESIA EVENT (OUTPATIENT)
Dept: ANESTHESIOLOGY | Facility: HOSPITAL | Age: 49
End: 2019-11-14

## 2019-11-14 ENCOUNTER — ANESTHESIA (OUTPATIENT)
Dept: ANESTHESIOLOGY | Facility: HOSPITAL | Age: 49
End: 2019-11-14

## 2019-11-14 VITALS — HEIGHT: 65 IN | BODY MASS INDEX: 23.82 KG/M2 | WEIGHT: 143 LBS

## 2019-11-14 VITALS — BODY MASS INDEX: 23.82 KG/M2 | HEIGHT: 65 IN | WEIGHT: 143 LBS

## 2019-11-14 DIAGNOSIS — M54.50 CHRONIC BILATERAL LOW BACK PAIN WITHOUT SCIATICA: Primary | ICD-10-CM

## 2019-11-14 DIAGNOSIS — M99.03 SOMATIC DYSFUNCTION OF LUMBAR REGION: ICD-10-CM

## 2019-11-14 DIAGNOSIS — G89.29 CHRONIC BILATERAL LOW BACK PAIN WITHOUT SCIATICA: Primary | ICD-10-CM

## 2019-11-14 DIAGNOSIS — M99.02 SOMATIC DYSFUNCTION OF THORACIC REGION: ICD-10-CM

## 2019-11-14 DIAGNOSIS — M99.04 SOMATIC DYSFUNCTION OF SACRAL REGION: ICD-10-CM

## 2019-11-14 DIAGNOSIS — M99.05 SOMATIC DYSFUNCTION OF PELVIS REGION: ICD-10-CM

## 2019-11-14 PROCEDURE — 99213 OFFICE O/P EST LOW 20 MIN: CPT | Performed by: FAMILY MEDICINE

## 2019-11-14 PROCEDURE — 3008F BODY MASS INDEX DOCD: CPT | Performed by: FAMILY MEDICINE

## 2019-11-14 PROCEDURE — 98927 OSTEOPATH MANJ 5-6 REGIONS: CPT | Performed by: FAMILY MEDICINE

## 2019-11-14 RX ORDER — SODIUM CHLORIDE 9 MG/ML
125 INJECTION, SOLUTION INTRAVENOUS CONTINUOUS
Status: CANCELLED | OUTPATIENT
Start: 2019-11-14

## 2019-11-14 NOTE — PATIENT INSTRUCTIONS
Lower Back Exercises   AMBULATORY CARE:   Lower back exercises  help heal and strengthen your back muscles to prevent another injury  Ask your healthcare provider if you need to see a physical therapist for more advanced exercises  Seek care immediately if:   · You have severe pain that prevents you from moving  Contact your healthcare provider if:   · Your pain becomes worse  · You have new pain  · You have questions or concerns about your condition or care  Do lower back exercises safely:   · Do the exercises on a mat or firm surface  (not on a bed) to support your spine and prevent low back pain  · Move slowly and smoothly  Avoid fast or jerky motions  · Breathe normally  Do not hold your breath  · Stop if you feel pain  It is normal to feel some discomfort at first  Regular exercise will help decrease your discomfort over time  Lower back exercises: Your healthcare provider may recommend that you do back exercises 10 to 30 minutes each day  He may also recommend that you do exercises 1 to 3 times each day  Ask your healthcare provider which exercises are best for you and how often to do them  · Ankle pumps:  Lie on your back  Move your foot up (with your toes pointing toward your head)  Then, move your foot down (with your toes pointing away from you)  Repeat this exercise 10 times on each side  · Heel slides:  Lie on your back  Slowly bend one leg and then straighten it  Next, bend the other leg and then straighten it  Repeat 10 times on each side  · Pelvic tilt:  Lie on your back with your knees bent and feet flat on the floor  Place your arms in a relaxed position beside your body  Tighten the muscles of your abdomen and flatten your back against the floor  Hold for 5 seconds  Repeat 5 times  · Back stretch:  Lie on your back with your hands behind your head  Bend your knees and turn the lower half of your body to one side   Hold this position for 10 seconds  Repeat 3 times on each side  · Straight leg raises:  Lie on your back with one leg straight  Bend the other knee  Tighten your abdomen and then slowly lift the straight leg up about 6 to 12 inches off the floor  Hold for 1 to 5 seconds  Lower your leg slowly  Repeat 10 times on each leg  · Knee-to-chest:  Lie on your back with your knees bent and feet flat on the floor  Pull one of your knees toward your chest and hold it there for 5 seconds  Return your leg to the starting position  Lift the other knee toward your chest and hold for 5 seconds  Do this 5 times on each side  · Cat and camel:  Place your hands and knees on the floor  Arch your back upward toward the ceiling and lower your head  Round out your spine as much as you can  Hold for 5 seconds  Lift your head upward and push your chest downward toward the floor  Hold for 5 seconds  Do 3 sets or as directed  · Wall squats:  Stand with your back against a wall  Tighten the muscles of your abdomen  Slowly lower your body until your knees are bent at a 45 degree angle  Hold this position for 5 seconds  Slowly move back up to a standing position  Repeat 10 times  · Curl up:  Lie on your back with your knees bent and feet flat on the floor  Place your hands, palms down, underneath the curve in your lower back  Next, with your elbows on the floor, lift your shoulders and chest 2 to 3 inches  Keep your head in line with your shoulders  Hold this position for 5 seconds  When you can do this exercise without pain for 10 to 15 seconds, you may add a rotation  While your shoulders and chest are lifted off the ground, turn slightly to the left and hold  Repeat on the other side  · Bird dog:  Place your hands and knees on the floor  Keep your wrists directly below your shoulders and your knees directly below your hips  Pull your belly button in toward your spine  Do not flatten or arch your back   Tighten your abdominal muscles  Raise one arm straight out so that it is aligned with your head  Next, raise the leg opposite your arm  Hold this position for 15 seconds  Lower your arm and leg slowly and change sides  Do 5 sets  © 2017 [employer] Information is for End User's use only and may not be sold, redistributed or otherwise used for commercial purposes  All illustrations and images included in CareNotes® are the copyrighted property of [employer]  or Chandana Devries  The above information is an  only  It is not intended as medical advice for individual conditions or treatments  Talk to your doctor, nurse or pharmacist before following any medical regimen to see if it is safe and effective for you

## 2019-11-14 NOTE — PROGRESS NOTES
Assessment/Plan     This is a 54 y o  female who presents for OMT follow-up for:  1  Chronic bilateral low back pain without sciatica  OMT    Patient tolerated OMT well today   Initial OMT visit  follow up in 2 weeks for OMT   2  Somatic dysfunction of thoracic region  OMT   3  Somatic dysfunction of sacral region  OMT   4  Somatic dysfunction of lumbar region  OMT   5  Somatic dysfunction of pelvis region  OMT       Plan:   1  Patient tolerated OMT well for the above problems,  advised patient to drink fluids and can use NSAID for soreness after treatment     2  OMT Follow up in 2 weeks  Subjective     Micaela Lopez is a 54 y o  female and is here for a OMT follow up  The patient reports lower back and middle back pain  She has had this pain for about 1 year  The pain feels like a dull ache/tightness in her muscles that comes and goes  She has tried taking aleve and tylenol about 3 times a week with mild improvement  She does have occasional radiculopathies with certain movements  Denies weakness, incontinence, or saddle anesthesias  Is the patient taking Pain medication? yes  Has the patient completed physical therapy for this condition? no  Did Patient symptoms improve from last OMT appointment? Initial visit    The following portions of the patient's history were reviewed and updated as appropriate: allergies, current medications, past family history, past medical history, past social history, past surgical history and problem list     Review of Systems  Do you have pain that bothers you in your daily life? yes  Review of Systems   Respiratory: Negative for cough, chest tightness, shortness of breath and wheezing  Cardiovascular: Negative for chest pain, palpitations and leg swelling  Musculoskeletal: Positive for back pain  Negative for arthralgias, gait problem, joint swelling, myalgias, neck pain and neck stiffness     Neurological: Negative for dizziness, seizures, syncope, weakness, light-headedness and numbness  Objective     Negative preeti test    OMT Exam   OMT  Performed by: Chuy Talbot DO  Authorized by: Chuy Talbot, DO     Verbal consent obtained?: Yes    Risks and benefits: Risks, benefits and alternatives were discussed    Consent given by:  Patient  Procedure Details:     Region evaluated and treated:  Thoracic T5 - T9, Lumbar, Thoracic T10 - T12, Sacrum/Pelvis and Pelvis Innominate    Thoracic T5 - T9 details:     Examination Method:  Tissue Texture Change, Stability, Laxity, Effusions, Tone, Asymmetry, Misalignment, Crepitation, Defects, Masses and Active    Severity:  Moderate    Osteopathic Findings:  T7 NRLSR, hypertonic paraspinal muscles    Treatment Method:  Muscle Energy Treatment, Soft Tissue Treatment and Myofascial Release Treatment    Response:  Improved - The somatic dysfunction is improved but not completely resolved  Thoracic T10 - T12 details:     Examination Method:  Tissue Texture Change, Stability, Laxity, Effusions, Tone    Severity:  Moderate    Osteopathic Findings:  Hypertonic paraspinal muscles    Treatment Method:  Soft Tissue Treatment, Myofascial Release Treatment and Muscle Energy Treatment    Response:  Improved - The somatic dysfunction is improved but not completely resolved  Lumbar details:     Examination Method:  Tissue Texture Change, Stability, Laxity, Effusions, Tone and Tenderness, Pain    Osteopathic Findings:  Quadratus lumborum spasm    Treatment Method:  Myofascial Release Treatment and Soft Tissue Treatment    Response:  Improved - The somatic dysfunction is improved but not completely resolved  Sacrum/Pelvis details:     Examination Method:  Tissue Texture Change, Stability, Laxity, Effusions, Tone    Osteopathic Findings:  R on L sacrum    Treatment Method:  High Velocity, Low Amplitude Treatment    Response:  Improved - The somatic dysfunction is improved but not completely resolved      Pelvis Innominate details: Examination Method:  Range of Motion, Contracture and Asymmetry, Misalignment, Crepitation, Defects, Masses    Osteopathic Findings:  Anterior innominate    Treatment Method:  Muscle Energy Treatment and High Velocity, Low Amplitude Treatment    Response:  Resolved - The somatic dysfunction is completely resolved without evidence of it ever having been present  Total Regions Treated:  5  Attending provider present in exam room for procedure:  Yes

## 2019-11-15 ENCOUNTER — ANESTHESIA (OUTPATIENT)
Dept: GASTROENTEROLOGY | Facility: HOSPITAL | Age: 49
End: 2019-11-15

## 2019-11-15 ENCOUNTER — ANESTHESIA EVENT (OUTPATIENT)
Dept: GASTROENTEROLOGY | Facility: HOSPITAL | Age: 49
End: 2019-11-15

## 2019-11-15 ENCOUNTER — HOSPITAL ENCOUNTER (OUTPATIENT)
Dept: GASTROENTEROLOGY | Facility: HOSPITAL | Age: 49
Setting detail: OUTPATIENT SURGERY
Discharge: HOME | End: 2019-11-15
Attending: SURGERY
Payer: COMMERCIAL

## 2019-11-15 VITALS
DIASTOLIC BLOOD PRESSURE: 62 MMHG | TEMPERATURE: 97.9 F | RESPIRATION RATE: 18 BRPM | OXYGEN SATURATION: 99 % | HEART RATE: 65 BPM | SYSTOLIC BLOOD PRESSURE: 110 MMHG | BODY MASS INDEX: 24.41 KG/M2 | WEIGHT: 143 LBS | HEIGHT: 64 IN

## 2019-11-15 DIAGNOSIS — K62.5 RECTAL BLEEDING: ICD-10-CM

## 2019-11-15 LAB
EXT PREGNANCY TEST URINE: NEGATIVE
EXT. CONTROL: NORMAL

## 2019-11-15 PROCEDURE — 45378 DIAGNOSTIC COLONOSCOPY: CPT | Performed by: SURGERY

## 2019-11-15 PROCEDURE — 81025 URINE PREGNANCY TEST: CPT | Performed by: SURGERY

## 2019-11-15 RX ORDER — SODIUM CHLORIDE 9 MG/ML
INJECTION, SOLUTION INTRAVENOUS CONTINUOUS PRN
Status: DISCONTINUED | OUTPATIENT
Start: 2019-11-15 | End: 2019-11-15 | Stop reason: SURG

## 2019-11-15 RX ORDER — SODIUM CHLORIDE 9 MG/ML
125 INJECTION, SOLUTION INTRAVENOUS CONTINUOUS
Status: DISCONTINUED | OUTPATIENT
Start: 2019-11-15 | End: 2019-11-19 | Stop reason: HOSPADM

## 2019-11-15 RX ORDER — PROPOFOL 10 MG/ML
INJECTION, EMULSION INTRAVENOUS AS NEEDED
Status: DISCONTINUED | OUTPATIENT
Start: 2019-11-15 | End: 2019-11-15 | Stop reason: SURG

## 2019-11-15 RX ADMIN — PROPOFOL 20 MG: 10 INJECTION, EMULSION INTRAVENOUS at 11:02

## 2019-11-15 RX ADMIN — SODIUM CHLORIDE 125 ML/HR: 0.9 INJECTION, SOLUTION INTRAVENOUS at 09:47

## 2019-11-15 RX ADMIN — PROPOFOL 50 MG: 10 INJECTION, EMULSION INTRAVENOUS at 10:52

## 2019-11-15 RX ADMIN — PROPOFOL 50 MG: 10 INJECTION, EMULSION INTRAVENOUS at 10:49

## 2019-11-15 RX ADMIN — SODIUM CHLORIDE: 0.9 INJECTION, SOLUTION INTRAVENOUS at 10:21

## 2019-11-15 RX ADMIN — PROPOFOL 50 MG: 10 INJECTION, EMULSION INTRAVENOUS at 10:55

## 2019-11-15 RX ADMIN — PROPOFOL 50 MG: 10 INJECTION, EMULSION INTRAVENOUS at 10:58

## 2019-11-15 NOTE — DISCHARGE INSTRUCTIONS
[facility]  Endoscopy Post-Operative Instructions  Dr Tori Frazier MD, FACS    Procedure: Colonoscopy    Findings:  Diverticulosis and Hemorrhoids    Follow-Up: You will need a repeat Endoscopy in (generally)10 years  You should have an endoscopy sooner than recommended if you have any symptoms of bleeding or change in stools or other concerns  You will receive a call from our office with your results, in addition to the the preliminary results you received today  You will usually receive a follow-up letter from our office in 1-2 weeks  Call the office if you do not hear from us  You are welcome to also schedule an office visit if desired to discuss the results further  It is your responsibility to contact our office for results in 1- 2 weeks if you do not hear from us  If a follow up endoscopy is needed, you are responsible for arranging that follow up appointment at the appropriate time  The office may or may not issue a reminder at that future time  Please take responsibility for your own follow up healthcare  Diet: Eat a light snack first, and then resume your previous diet  Call the office if you have unusual fevers, chills, nausea or vomiting or abdominal pain  Report to the emergency room with these are severe in nature  Activity: Do not drive a car, operate machinery, or sign legal documents for 24 hours after your procedure  Normal activity may be resumed on the day following the procedure  Call the office at [phone] for any of the following: Severe abdominal pain, significant rectal bleeding, chills, or fever above 100°, new onset of persistent cough or persistent vomiting       [address], [address]  [facility], [address]   Phone: [phone]

## 2019-11-15 NOTE — ANESTHESIA PREPROCEDURE EVALUATION
Review of Systems/Medical History  Patient summary reviewed  Chart reviewed      Cardiovascular  Negative cardio ROS Exercise tolerance (METS): >4,     Pulmonary  Negative pulmonary ROS        GI/Hepatic  Negative GI/hepatic ROS   Bowel prep       Negative  ROS        Endo/Other  History of thyroid disease , hypothyroidism,      GYN       Hematology   Musculoskeletal  Negative musculoskeletal ROS        Neurology  Negative neurology ROS      Psychology           Physical Exam    Airway    Mallampati score: II  TM Distance: >3 FB  Neck ROM: full     Dental       Cardiovascular  Comment: Negative ROS, Cardiovascular exam normal    Pulmonary  Pulmonary exam normal     Other Findings        Anesthesia Plan  ASA Score- 2     Anesthesia Type- IV sedation with anesthesia with ASA Monitors  Additional Monitors:   Airway Plan:         Plan Factors-    Induction-     Postoperative Plan-     Informed Consent- Anesthetic plan and risks discussed with patient  I personally reviewed this patient with the CRNA  Discussed and agreed on the Anesthesia Plan with the CRNA  Leny Shah

## 2019-11-18 ENCOUNTER — TELEPHONE (OUTPATIENT)
Dept: SURGERY | Facility: CLINIC | Age: 49
End: 2019-11-18

## 2019-11-18 NOTE — TELEPHONE ENCOUNTER
S/P = Colonoscopy = 11/15/19    Patient called back  She denies any F/V/N/C and she has had a bowel movement  Patient is aware no polyps were found or removed  She will call the office if she needs us  No path sent

## 2019-12-05 ENCOUNTER — OFFICE VISIT (OUTPATIENT)
Dept: FAMILY MEDICINE CLINIC | Facility: CLINIC | Age: 49
End: 2019-12-05

## 2019-12-05 VITALS — HEIGHT: 64 IN | BODY MASS INDEX: 24.45 KG/M2 | WEIGHT: 143.2 LBS

## 2019-12-05 DIAGNOSIS — I83.92 SPIDER VEIN OF LEFT LOWER EXTREMITY: ICD-10-CM

## 2019-12-05 DIAGNOSIS — M99.02 SOMATIC DYSFUNCTION OF THORACIC REGION: ICD-10-CM

## 2019-12-05 DIAGNOSIS — M99.05 SOMATIC DYSFUNCTION OF PELVIS REGION: ICD-10-CM

## 2019-12-05 DIAGNOSIS — M99.03 SOMATIC DYSFUNCTION OF LUMBAR REGION: Primary | ICD-10-CM

## 2019-12-05 RX ORDER — POLYETHYLENE GLYCOL 3350 17 G/17G
17 POWDER, FOR SOLUTION ORAL DAILY
COMMUNITY
Start: 2015-05-25

## 2019-12-05 NOTE — PROGRESS NOTES
The Assessment/Plan        Problem List Items Addressed This Visit     None      Visit Diagnoses     Somatic dysfunction of lumbar region    -  Primary    Somatic dysfunction of pelvis region        Somatic dysfunction of thoracic region        Spider vein of left lower extremity               This is a 54 y o  female who presents for OMT follow-up  1  Patient tolerated OMT well for the above problems, advised stretching and heat application for continued relief  2  OMT Follow up in 2-4 weeks  Of note, at the end of the visit the patient asks about why she will have spots on her left leg that are painful to touch, and then slowly fade over months  When pointing to a spot on her lateral leg by her knee and her posterior knee, there are small patches of spider veins/talangietasias visible  She will also have intermittent pain of her left knee, usually resolved after 2-3 days  She has no pain currently, no calf swelling or tenderness  I advised her to take a picture of a new lesion the next time it occurs so we can see what it looks like when it starts out  I advised adequate hydration, fruit and vegetable intake/healthy diet, and I reviewed calf and quadriceps strengthening exercises  The painful skin lesions are likely due to spider veins  I advised to call if symptoms get worse, and she has a follow-up appointment with her PCP later this month  Toshia Bates is a 54 y o  female and is here for a OMT follow up  The patient reports she continues to have lower back pain intermittently, unchanged in quality since last OMT visit, overall improved  She will take Tylenol or Aleve as needed for the pain  She denies any injury  She denies any weakness or numbness of her lower extremities  She denies any bowel or bladder incontinence  Is the patient taking Pain medication? Tylenol/Aleve  Has the patient completed physical therapy for this condition?  no  Did Patient symptoms improve from last OMT appointment? yes    The following portions of the patient's history were reviewed and updated as appropriate: allergies, current medications, past family history, past medical history, past social history, past surgical history and problem list     Review of Systems  Review of Systems   Genitourinary: Negative for difficulty urinating  Musculoskeletal: Positive for arthralgias (intermittent left knee pain) and back pain  Negative for gait problem  Neurological: Negative for weakness and numbness           Objective     OMT Exam   Thoracic T10-12:  Somatic Dysfunction:  Tissue Texture Changes, Asymmetry , Restriction and Tenderness  Severity :  2  Osteopathic Findings: Paraspinal hypertonicity with ropy musculature L>R, myofascial restriction   Treatment Method: ME, ST and MFR  Response: improved  Lumbar:  Somatic Dysfunction:  Tissue Texture Changes, Asymmetry , Restriction and Tenderness  Severity :  2  Osteopathic Findings: Paraspinal hypertonicity with ropy musculature L>R, myofascial restriction  Treatment Method: ME, ST and MFR  Response: improved  Pelvis:  Somatic Dysfunction:  Tissue Texture Changes, Asymmetry , Restriction and Tenderness  Severity :  1  Osteopathic Findings: Right anterior innominate rotation  Treatment Method: HVLA and ME  Response: resolved    Maame Prajapati DO PGY-3  [address] [facility]

## 2019-12-26 ENCOUNTER — OFFICE VISIT (OUTPATIENT)
Dept: FAMILY MEDICINE CLINIC | Facility: CLINIC | Age: 49
End: 2019-12-26

## 2019-12-26 VITALS
HEART RATE: 72 BPM | SYSTOLIC BLOOD PRESSURE: 120 MMHG | BODY MASS INDEX: 24.34 KG/M2 | HEIGHT: 64 IN | WEIGHT: 142.6 LBS | RESPIRATION RATE: 16 BRPM | DIASTOLIC BLOOD PRESSURE: 60 MMHG | TEMPERATURE: 97 F

## 2019-12-26 DIAGNOSIS — K59.00 CONSTIPATION, UNSPECIFIED CONSTIPATION TYPE: Primary | ICD-10-CM

## 2019-12-26 DIAGNOSIS — D50.9 IRON DEFICIENCY ANEMIA, UNSPECIFIED IRON DEFICIENCY ANEMIA TYPE: ICD-10-CM

## 2019-12-26 DIAGNOSIS — E03.9 HYPOTHYROIDISM, UNSPECIFIED TYPE: ICD-10-CM

## 2019-12-26 PROCEDURE — 3008F BODY MASS INDEX DOCD: CPT | Performed by: FAMILY MEDICINE

## 2019-12-26 PROCEDURE — 1036F TOBACCO NON-USER: CPT | Performed by: FAMILY MEDICINE

## 2019-12-26 PROCEDURE — 99214 OFFICE O/P EST MOD 30 MIN: CPT | Performed by: FAMILY MEDICINE

## 2019-12-26 NOTE — PROGRESS NOTES
Assessment/Plan:       Diagnoses and all orders for this visit:    Constipation:  · Abdominal exam benign  Advised patient to drink plenty of fluids, eat diet rich in fiber  Prescribed Metamucil as patient gets nausea with Colace  Discussed avoiding constipation due to diverticula found on colonoscopy  -     psyllium (METAMUCIL SMOOTH TEXTURE) 28 % packet; Take 1 packet by mouth 2 (two) times a day    Hypothyroidism:  · Last TSH on 10/30 19 135  Continue levothyroxine 150 mcg daily  Recheck TSH  Iron deficiency anemia:  · Last CBC showed hemoglobin 11 9  · Advised patient to continue eating iron rich diet  Follow-up in 3 months    Subjective:      Patient ID: Blake Andersen is a 54 y o  female  HPI:    82-year-old female here for constipation, hypothyroidism  Patient did have recently colonoscopy which show at some diverticula and hemorrhoids  He does report some abdominal cramping from constipation  She does report hard stools for 2-3 days  She is taking MiraLax and colace as needed for constipation  She reports that she gets nausea from Colace  She requested another medication  She is taking levothyroxine 150 mcg daily  She reports that she feels bloated sometimes  Her last TSH was low  She reports that she is not taking iron due to constipation  She reports eating iron rich diet like spinach and other vegetables  The following portions of the patient's history were reviewed and updated as appropriate: allergies, current medications, past family history, past medical history, past social history, past surgical history and problem list     Review of Systems   Constitutional: Negative for activity change, appetite change, chills, fatigue, fever and unexpected weight change  Respiratory: Negative for cough, shortness of breath and wheezing  Cardiovascular: Negative for chest pain, palpitations and leg swelling     Gastrointestinal: Positive for abdominal pain and constipation  Negative for blood in stool, nausea and vomiting  Endocrine: Negative for cold intolerance, heat intolerance, polydipsia, polyphagia and polyuria  Skin: Negative for rash  Neurological: Negative for weakness and headaches  Hematological: Negative for adenopathy  Objective:      /60   Pulse 72   Temp (!) 97 °F (36 1 °C)   Resp 16   Ht 5' 4" (1 626 m)   Wt 64 7 kg (142 lb 9 6 oz)   BMI 24 48 kg/m²          Physical Exam   Constitutional: She appears well-developed and well-nourished  HENT:   Head: Normocephalic  Nose: Nose normal    Eyes: Conjunctivae are normal    Neck: Neck supple  Cardiovascular: Normal rate, regular rhythm, normal heart sounds and intact distal pulses  Pulmonary/Chest: Effort normal and breath sounds normal    Abdominal: Soft  Bowel sounds are normal  She exhibits no distension and no mass  There is no tenderness  There is no rebound and no guarding  Musculoskeletal: Normal range of motion  Neurological: She is alert  Skin: Skin is warm  Psychiatric: She has a normal mood and affect   Her behavior is normal

## 2019-12-27 ENCOUNTER — APPOINTMENT (OUTPATIENT)
Dept: LAB | Facility: CLINIC | Age: 49
End: 2019-12-27
Payer: COMMERCIAL

## 2019-12-27 DIAGNOSIS — E03.9 HYPOTHYROIDISM, UNSPECIFIED TYPE: ICD-10-CM

## 2019-12-27 LAB — TSH SERPL DL<=0.05 MIU/L-ACNC: 0.62 UIU/ML (ref 0.36–3.74)

## 2019-12-27 PROCEDURE — 36415 COLL VENOUS BLD VENIPUNCTURE: CPT

## 2019-12-27 PROCEDURE — 84443 ASSAY THYROID STIM HORMONE: CPT

## 2020-01-02 ENCOUNTER — HOSPITAL ENCOUNTER (OUTPATIENT)
Dept: MAMMOGRAPHY | Facility: HOSPITAL | Age: 50
Discharge: HOME | End: 2020-01-02
Attending: FAMILY MEDICINE
Payer: COMMERCIAL

## 2020-01-02 VITALS — BODY MASS INDEX: 24.24 KG/M2 | HEIGHT: 64 IN | WEIGHT: 142 LBS

## 2020-01-02 DIAGNOSIS — Z12.31 ENCOUNTER FOR SCREENING MAMMOGRAM FOR BREAST CANCER: ICD-10-CM

## 2020-01-02 PROCEDURE — 77067 SCR MAMMO BI INCL CAD: CPT

## 2020-01-20 NOTE — PROGRESS NOTES
Assessment/Plan:      Diagnoses and all orders for this visit:    Vaginal discharge  -     POCT wet mount    Dysuria  -     POCT urine dip  -     Urine culture    Skin yeast infection  -     nystatin-triamcinolone (MYCOLOG-II) ointment; Apply topically 2 (two) times a day      -reviewed diagnosis of external yeast infection and treatment with Mycolog cream   Written information provided  Patient verbalizes understanding  -hygiene reviewed including avoid scented soaps lotions and lubricants, avoid douching, encouraged to try to avoid scratching  -will treat urine based on culture results  Reviewed with patient to call office with worsening symptoms -onset of fever, chills, frequency, urgency or CVA tenderness    RTO if symptoms do not resolve or worsen and 2020 for annual exam    Subjective:     Patient ID: Danyelle Guardian is a 54 y o  female  HPI  here with complaints of vaginal itching for 3-4 days  Itching is mostly on the outside  Denies changes in vaginal discharge and vaginal odor  Admits to intermittent burning with urination  Denies frequency, urgency  Has not tried any over-the-counter remedies  Denies alleviating or aggravating factors Denies fever, chills, pelvic pain, new partner, bowel  concerns   Last Pap smear 19- NILM/ HR HPV  Negative   Last mammogram 2020 BI-RADS 1   Last CRC screening -  colonoscopy     Review of Systems   Constitutional: Negative for chills and fever  Respiratory: Negative  Cardiovascular: Negative  Genitourinary: Positive for dysuria  Negative for difficulty urinating, dyspareunia, frequency, genital sores, pelvic pain, urgency, vaginal discharge and vaginal pain  Vaginal itching   Neurological: Negative for headaches  Objective:     Physical Exam   Constitutional: She is oriented to person, place, and time  She appears well-developed and well-nourished  Cardiovascular: Normal rate, regular rhythm and normal heart sounds  Pulmonary/Chest: Effort normal and breath sounds normal    Genitourinary: Uterus normal  There is rash and tenderness on the right labia  There is no lesion or injury on the right labia  There is rash, tenderness and injury on the left labia  There is no lesion on the left labia  Cervix exhibits no motion tenderness, no discharge and no friability  Right adnexum displays no mass, no tenderness and no fullness  Left adnexum displays no mass, no tenderness and no fullness  No erythema, tenderness or bleeding in the vagina  No foreign body in the vagina  No signs of injury around the vagina  No vaginal discharge found  Genitourinary Comments: Left and right labia with generalized erythema  Left labia with bruising from scratching  No evidence of herpetic lesions or open sores  Neurological: She is alert and oriented to person, place, and time  Psychiatric: She has a normal mood and affect   Her behavior is normal  Thought content normal

## 2020-01-21 ENCOUNTER — OFFICE VISIT (OUTPATIENT)
Dept: OBGYN CLINIC | Facility: CLINIC | Age: 50
End: 2020-01-21
Payer: COMMERCIAL

## 2020-01-21 VITALS
BODY MASS INDEX: 24.69 KG/M2 | SYSTOLIC BLOOD PRESSURE: 112 MMHG | DIASTOLIC BLOOD PRESSURE: 76 MMHG | WEIGHT: 144.6 LBS | HEIGHT: 64 IN

## 2020-01-21 DIAGNOSIS — B37.2 SKIN YEAST INFECTION: ICD-10-CM

## 2020-01-21 DIAGNOSIS — R30.0 DYSURIA: ICD-10-CM

## 2020-01-21 DIAGNOSIS — N89.8 VAGINAL DISCHARGE: Primary | ICD-10-CM

## 2020-01-21 LAB
BV WHIFF TEST VAG QL: NEGATIVE
CLUE CELLS SPEC QL WET PREP: NORMAL
PH SMN: 4.5 [PH]
SL AMB  POCT GLUCOSE, UA: NEGATIVE
SL AMB LEUKOCYTE ESTERASE,UA: 500
SL AMB POCT BILIRUBIN,UA: NEGATIVE
SL AMB POCT BLOOD,UA: NEGATIVE
SL AMB POCT CLARITY,UA: CLEAR
SL AMB POCT COLOR,UA: YELLOW
SL AMB POCT KETONES,UA: NEGATIVE
SL AMB POCT NITRITE,UA: NEGATIVE
SL AMB POCT PH,UA: 6.5
SL AMB POCT SPECIFIC GRAVITY,UA: 1.01
SL AMB POCT URINE PROTEIN: NEGATIVE
SL AMB POCT UROBILINOGEN: 0.2
SL AMB POCT WET MOUNT: NEGATIVE
T VAGINALIS VAG QL WET PREP: NORMAL
YEAST VAG QL WET PREP: NEGATIVE

## 2020-01-21 PROCEDURE — 87210 SMEAR WET MOUNT SALINE/INK: CPT | Performed by: NURSE PRACTITIONER

## 2020-01-21 PROCEDURE — 99213 OFFICE O/P EST LOW 20 MIN: CPT | Performed by: NURSE PRACTITIONER

## 2020-01-21 PROCEDURE — 81002 URINALYSIS NONAUTO W/O SCOPE: CPT | Performed by: NURSE PRACTITIONER

## 2020-01-21 PROCEDURE — 87086 URINE CULTURE/COLONY COUNT: CPT | Performed by: NURSE PRACTITIONER

## 2020-01-21 RX ORDER — NYSTATIN AND TRIAMCINOLONE ACETONIDE 100000; 1 [USP'U]/G; MG/G
OINTMENT TOPICAL 2 TIMES DAILY
Qty: 30 G | Refills: 0 | Status: SHIPPED | OUTPATIENT
Start: 2020-01-21 | End: 2020-01-29 | Stop reason: ALTCHOICE

## 2020-01-21 NOTE — PATIENT INSTRUCTIONS
Nystatin/Triamcinolone (On the skin)   Nystatin (noris-STAT-in), Triamcinolone (trye-am-SIN-oh-lone)  Treats fungal infections of the skin, especially yeast infections  Brand Name(s):   There may be other brand names for this medicine  When This Medicine Should Not Be Used: You should not use this medicine if you have ever had an allergic reaction to nystatin, triamcinolone, or any cortisone drugs  How to Use This Medicine:   Cream, Ointment  · Your doctor will tell you how much medicine to use and how often  · Put the medicine on the affected area and rub in gently  Keep it out of your eyes  · Do not put a bandage on the area unless your doctor has told you to  · Avoid tight-fitting diapers and plastic pants if using on your child's diaper area  · Wear loose-fitting clothes when using this medicine on the groin area (crotch)  · It is important to use this medicine for as long as your doctor tells you to  If your infection does not begin to clear up after 2 to 3 weeks, or if it gets worse, call your doctor  If a dose is missed:   · Use your medicine as soon as you remember that you have missed your dose  · If it is nearly time for your next dose, wait until then to use the medicine and skip the missed dose  · You should not use two doses at one time  · Try not to miss any doses of this medicine  How to Store and Dispose of This Medicine:   · Keep your medicine at room temperature, away from heat and direct light  Do not freeze  · Ask your pharmacist, doctor, or health caregiver about the best way to dispose of any outdated medicine or medicine no longer needed  · Keep all medicine away from children  Drugs and Foods to Avoid:   Ask your doctor or pharmacist before using any other medicine, including over-the-counter medicines, vitamins, and herbal products  · Make sure your doctor knows if you are using any other medicines or lotions on your skin    · If you are diabetic, ask your doctor before changing your diet or dosage of antidiabetic medicine  Warnings While Using This Medicine:   · If you are pregnant or breastfeeding, talk to your doctor before using this medicine  · If you become pregnant while using this medicine, be sure to tell your doctor  · You should not use more often or for a longer time than your doctor ordered  · Tell your doctor if you have any other medical problems, especially diabetes, herpes, tuberculosis of the skin, vaccina (cowpox), varicella (chickenpox), or other skin infections  · Call your doctor if you have skin irritation that was not there before you started using this medicine  Possible Side Effects While Using This Medicine: If you notice these less serious side effects, talk with your doctor:  · Blistering, burning, itching, or peeling skin  · Break-out of acne (pimples)  · Reddish purple lines on skin  · Easy bruising  If you notice other side effects that you think are caused by this medicine, tell your doctor  Call your doctor for medical advice about side effects  You may report side effects to FDA at 6-198-FDA-9851  © 2017 [employer] Information is for End User's use only and may not be sold, redistributed or otherwise used for commercial purposes  The above information is an  only  It is not intended as medical advice for individual conditions or treatments  Talk to your doctor, nurse or pharmacist before following any medical regimen to see if it is safe and effective for you

## 2020-01-22 LAB — BACTERIA UR CULT: NORMAL

## 2020-01-24 ENCOUNTER — OFFICE VISIT (OUTPATIENT)
Dept: FAMILY MEDICINE CLINIC | Facility: CLINIC | Age: 50
End: 2020-01-24

## 2020-01-24 VITALS — HEIGHT: 65 IN | BODY MASS INDEX: 23.79 KG/M2 | WEIGHT: 142.8 LBS

## 2020-01-24 DIAGNOSIS — M54.50 CHRONIC BILATERAL LOW BACK PAIN WITHOUT SCIATICA: ICD-10-CM

## 2020-01-24 DIAGNOSIS — M99.02 SOMATIC DYSFUNCTION OF THORACIC REGION: ICD-10-CM

## 2020-01-24 DIAGNOSIS — G89.29 CHRONIC BILATERAL LOW BACK PAIN WITHOUT SCIATICA: ICD-10-CM

## 2020-01-24 DIAGNOSIS — I83.893 VARICOSE VEINS OF LEG WITH SWELLING, BILATERAL: ICD-10-CM

## 2020-01-24 DIAGNOSIS — M99.03 SOMATIC DYSFUNCTION OF LUMBAR REGION: ICD-10-CM

## 2020-01-24 DIAGNOSIS — I83.92 SPIDER VEIN OF LEFT LOWER EXTREMITY: Primary | ICD-10-CM

## 2020-01-24 PROCEDURE — 99213 OFFICE O/P EST LOW 20 MIN: CPT | Performed by: FAMILY MEDICINE

## 2020-01-24 NOTE — PATIENT INSTRUCTIONS
Varicose Veins   AMBULATORY CARE:   Varicose veins  are veins that become large, twisted, and swollen  They are common on the back of the calves, knees, and thighs  Varicose veins are caused by valves in your veins that do not work properly  This causes blood to collect and increase pressure in the veins of your legs  The increased pressure causes your veins to stretch, get larger, swell, and twist        Common symptoms include the following: Your symptoms may be worse after you stand or sit for long periods of time  You may have any of the following:  · Blue, purple, or bulging veins in your legs     · Pain, swelling, or muscle cramps in your legs    · Feeling of fatigue or heaviness in your legs    · Cramping in your legs  Seek care immediately if:   · You have a wound that does not heal or is infected  · You have an injury that has broken your skin and caused your varicose veins to bleed  · Your leg is swollen and hard  · You notice that your legs or feet are turning blue or black  · Your leg feels warm, tender, and painful  It may look swollen and red  Contact your healthcare provider if:   · You have pain in your leg that does not go away or gets worse  · You notice sudden large bruising on your legs  · You have a rash on your leg  · Your symptoms keep you from doing your daily activities  · You have questions or concerns about your condition or care  Treatment of varicose veins  aims to decrease symptoms, improve appearance, and prevent further problems  Treatment will depend on which veins are affected and how severe your condition is  You may need procedures to treat or remove your varicose veins  For example, your healthcare provider may inject a solution or use a laser to close the varicose veins  Surgery to remove long veins may also be done  Ask your healthcare provider for more information about procedures used to treat varicose veins    Manage varicose veins:   · Do not sit or stand for long periods of time  This can cause the blood to collect in your legs and make your symptoms worse  Bend or rotate your ankles several times every hour  Walk around for a few minutes every hour to get blood moving in your legs  · Do not cross your legs when you sit  This decreases blood flow to your feet and can make your symptoms worse  · Do not wear tight clothing or shoes  Do not wear high-heeled shoes  Do not wear clothes that are tight around the waist or knees  · Maintain a healthy weight  Being overweight or obese can make your varicose veins worse  Ask your healthcare provider how much you should weigh  Ask him or her to help you create a weight loss plan if you are overweight  · Wear pressure stockings as directed  The stockings are tight and put pressure on your legs  They improve blood flow and help prevent clots  · Elevate your legs  Keep them above the level of your heart for 15 to 30 minutes several times a day  You can also prop the end of your bed up slightly to elevate your legs while you sleep  This will help blood to flow back to your heart  · Get regular exercise  Talk to your healthcare provider about the best exercise plan for you  Exercise can improve blood flow to your legs and feet  Follow up with your healthcare provider as directed:  Write down your questions so you remember to ask them during your visits  © 2017 [employer] Information is for End User's use only and may not be sold, redistributed or otherwise used for commercial purposes  All illustrations and images included in CareNotes® are the copyrighted property of [employer]  or Chandana Devries  The above information is an  only  It is not intended as medical advice for individual conditions or treatments  Talk to your doctor, nurse or pharmacist before following any medical regimen to see if it is safe and effective for you    Lower Back Exercises   AMBULATORY CARE:   Lower back exercises  help heal and strengthen your back muscles to prevent another injury  Ask your healthcare provider if you need to see a physical therapist for more advanced exercises  Seek care immediately if:   · You have severe pain that prevents you from moving  Contact your healthcare provider if:   · Your pain becomes worse  · You have new pain  · You have questions or concerns about your condition or care  Do lower back exercises safely:   · Do the exercises on a mat or firm surface  (not on a bed) to support your spine and prevent low back pain  · Move slowly and smoothly  Avoid fast or jerky motions  · Breathe normally  Do not hold your breath  · Stop if you feel pain  It is normal to feel some discomfort at first  Regular exercise will help decrease your discomfort over time  Lower back exercises: Your healthcare provider may recommend that you do back exercises 10 to 30 minutes each day  He may also recommend that you do exercises 1 to 3 times each day  Ask your healthcare provider which exercises are best for you and how often to do them  · Ankle pumps:  Lie on your back  Move your foot up (with your toes pointing toward your head)  Then, move your foot down (with your toes pointing away from you)  Repeat this exercise 10 times on each side  · Heel slides:  Lie on your back  Slowly bend one leg and then straighten it  Next, bend the other leg and then straighten it  Repeat 10 times on each side  · Pelvic tilt:  Lie on your back with your knees bent and feet flat on the floor  Place your arms in a relaxed position beside your body  Tighten the muscles of your abdomen and flatten your back against the floor  Hold for 5 seconds  Repeat 5 times  · Back stretch:  Lie on your back with your hands behind your head  Bend your knees and turn the lower half of your body to one side  Hold this position for 10 seconds  Repeat 3 times on each side  · Straight leg raises:  Lie on your back with one leg straight  Bend the other knee  Tighten your abdomen and then slowly lift the straight leg up about 6 to 12 inches off the floor  Hold for 1 to 5 seconds  Lower your leg slowly  Repeat 10 times on each leg  · Knee-to-chest:  Lie on your back with your knees bent and feet flat on the floor  Pull one of your knees toward your chest and hold it there for 5 seconds  Return your leg to the starting position  Lift the other knee toward your chest and hold for 5 seconds  Do this 5 times on each side  · Cat and camel:  Place your hands and knees on the floor  Arch your back upward toward the ceiling and lower your head  Round out your spine as much as you can  Hold for 5 seconds  Lift your head upward and push your chest downward toward the floor  Hold for 5 seconds  Do 3 sets or as directed  · Wall squats:  Stand with your back against a wall  Tighten the muscles of your abdomen  Slowly lower your body until your knees are bent at a 45 degree angle  Hold this position for 5 seconds  Slowly move back up to a standing position  Repeat 10 times  · Curl up:  Lie on your back with your knees bent and feet flat on the floor  Place your hands, palms down, underneath the curve in your lower back  Next, with your elbows on the floor, lift your shoulders and chest 2 to 3 inches  Keep your head in line with your shoulders  Hold this position for 5 seconds  When you can do this exercise without pain for 10 to 15 seconds, you may add a rotation  While your shoulders and chest are lifted off the ground, turn slightly to the left and hold  Repeat on the other side  · Bird dog:  Place your hands and knees on the floor  Keep your wrists directly below your shoulders and your knees directly below your hips  Pull your belly button in toward your spine  Do not flatten or arch your back   Tighten your abdominal muscles  Raise one arm straight out so that it is aligned with your head  Next, raise the leg opposite your arm  Hold this position for 15 seconds  Lower your arm and leg slowly and change sides  Do 5 sets  © 2017 [facility] Information is for End User's use only and may not be sold, redistributed or otherwise used for commercial purposes  All illustrations and images included in CareNotes® are the copyrighted property of [employer]  or Chandana Devries  The above information is an  only  It is not intended as medical advice for individual conditions or treatments  Talk to your doctor, nurse or pharmacist before following any medical regimen to see if it is safe and effective for you  Core Strengthening Exercises   AMBULATORY CARE:   What you need to know about core strengthening exercises: Your core includes the muscles of your lower back, hip, pelvis, and abdomen  Core strengthening exercises help heal and strengthen these muscles  This helps prevent another injury, and keeps your pelvis, spine, and hips in the correct position  Contact your healthcare provider if:   · You have sharp or worsening pain during exercise or at rest     · You have questions or concerns about your shoulder exercises  Safety tips:  Talk to your healthcare provider before you start an exercise program  A physical therapist can teach you how to do core strengthening exercises safely  · Do the exercises on a mat or firm surface  A firm surface will support your spine and avoid low back pain  Do not do these exercises on a bed  · Move slowly and smoothly  Avoid fast or jerky motions  · Stop if you feel pain  Core exercises should not feel painful  Stop if you feel pain  · Breathe normally during core exercises  Do not hold your breath  This may cause an increase in blood pressure and prevent muscle strengthening   Your healthcare provider will tell you when to inhale and exhale during the exercise  · Begin all of your exercises with abdominal bracing  Abdominal bracing helps warm up your core muscles  You can also practice abdominal bracing throughout the day while you are sitting or standing  Lie on your back with your knees bent and feet flat on the floor  Place your arms in a relaxed position beside your body  Tighten your abdominal muscles  Pull your belly button in and up toward your spine  Hold for 5 seconds  Relax your muscles  Repeat 10 times  Core strengthening exercises: Your healthcare provider will tell you how often to do these exercises  The provider will also tell you how many repetitions of each exercise you should do  Hold each exercise for 5 seconds or as directed  As you get stronger, increase your hold to 10 to 15 seconds  You can do some of these exercises on a stability ball, or with a weight  Ask your healthcare provider how to use a stability ball or weight for these exercises:  · Bent leg side bridge:  Lie on one side with your legs, hips, and shoulders in a straight line  Prop yourself up onto your forearm so your elbow is directly below your shoulder  Bend your knees to 90°  Lift your hips off the floor  Balance yourself on your forearm and the side of your knee  Hold this position  Repeat on the other side  · Straight leg side bridge:  Lie on one side with your legs, hips, and shoulders in a straight line  Prop yourself up onto your forearm so your elbow is directly below your shoulder  Your top leg can rest in front of your bottom leg for support  Lift your hips off the floor  Balance yourself on your forearm and the outside of your flexed foot  Do not let your ankle bend sideways  Hold this position  Repeat on the other side  · Superman:  Lie on your stomach  Extend your arms forward on the floor  Tighten your abdominal muscles and lift your right hand and left leg off the floor  Hold this position   Slowly return to the starting position  Tighten your abdominal muscles and lift your left hand and right leg off the floor  Hold this position  Slowly return to the starting position  · Curl up:  Lie on your back with your knees bent and feet flat on the floor  Place your hands, palms down, underneath your lower back  Next, with your elbows on the floor, lift your shoulders and chest 2 to 3 inches off the floor  Keep your head in line with your shoulders  Hold this position  Slowly return to the starting position  · Straight leg raises:  Lie on your back with one leg straight  Bend the other knee and place your foot flat on the floor  Tighten your abdominal muscles  Keep your leg straight and slowly lift it straight up 6 to 12 inches off the floor  Hold this position  Lower your leg slowly  Do as many repetitions as directed on this side  Repeat with the other leg  · Plank:  Lie on your stomach  Bend your elbows and place your forearms flat on the floor  Lift your chest, stomach, and knees off the floor  Make sure your elbows are below your shoulders  Your body should be in a straight line  Do not let your hips or lower back sink to the ground  Squeeze your abdominal muscles together and hold for 15 seconds  To make this exercise harder, hold for 30 seconds or lift 1 leg at a time  · Bicycles:  Lie on your back  Bend both knees and bring them toward your chest  Your calves should be parallel to the floor  Place the palms of your hands on the back of your head  Straighten your right leg and keep it lifted 2 inches off the floor  Raise your head and shoulders off the floor and twist towards your left  Keep your head and shoulders lifted  Bend your right knee while you straighten your left leg  Keep your left leg 2 inches off the floor  Twist your head and chest towards the left leg   Continue to straighten 1 leg at a time and twist        Follow up with your healthcare provider as directed:  Write down your questions so you remember to ask them during your visits  © 2017 [employer] [employer] Information is for End User's use only and may not be sold, redistributed or otherwise used for commercial purposes  All illustrations and images included in CareNotes® are the copyrighted property of [employer]  or Chandana Devries  The above information is an  only  It is not intended as medical advice for individual conditions or treatments  Talk to your doctor, nurse or pharmacist before following any medical regimen to see if it is safe and effective for you  Varicose Veins   AMBULATORY CARE:   Varicose veins  are veins that become large, twisted, and swollen  They are common on the back of the calves, knees, and thighs  Varicose veins are caused by valves in your veins that do not work properly  This causes blood to collect and increase pressure in the veins of your legs  The increased pressure causes your veins to stretch, get larger, swell, and twist        Common symptoms include the following: Your symptoms may be worse after you stand or sit for long periods of time  You may have any of the following:  · Blue, purple, or bulging veins in your legs     · Pain, swelling, or muscle cramps in your legs    · Feeling of fatigue or heaviness in your legs    · Cramping in your legs  Seek care immediately if:   · You have a wound that does not heal or is infected  · You have an injury that has broken your skin and caused your varicose veins to bleed  · Your leg is swollen and hard  · You notice that your legs or feet are turning blue or black  · Your leg feels warm, tender, and painful  It may look swollen and red  Contact your healthcare provider if:   · You have pain in your leg that does not go away or gets worse  · You notice sudden large bruising on your legs  · You have a rash on your leg       · Your symptoms keep you from doing your daily activities  · You have questions or concerns about your condition or care  Treatment of varicose veins  aims to decrease symptoms, improve appearance, and prevent further problems  Treatment will depend on which veins are affected and how severe your condition is  You may need procedures to treat or remove your varicose veins  For example, your healthcare provider may inject a solution or use a laser to close the varicose veins  Surgery to remove long veins may also be done  Ask your healthcare provider for more information about procedures used to treat varicose veins  Manage varicose veins:   · Do not sit or stand for long periods of time  This can cause the blood to collect in your legs and make your symptoms worse  Bend or rotate your ankles several times every hour  Walk around for a few minutes every hour to get blood moving in your legs  · Do not cross your legs when you sit  This decreases blood flow to your feet and can make your symptoms worse  · Do not wear tight clothing or shoes  Do not wear high-heeled shoes  Do not wear clothes that are tight around the waist or knees  · Maintain a healthy weight  Being overweight or obese can make your varicose veins worse  Ask your healthcare provider how much you should weigh  Ask him or her to help you create a weight loss plan if you are overweight  · Wear pressure stockings as directed  The stockings are tight and put pressure on your legs  They improve blood flow and help prevent clots  · Elevate your legs  Keep them above the level of your heart for 15 to 30 minutes several times a day  You can also prop the end of your bed up slightly to elevate your legs while you sleep  This will help blood to flow back to your heart  · Get regular exercise  Talk to your healthcare provider about the best exercise plan for you  Exercise can improve blood flow to your legs and feet    Follow up with your healthcare provider as directed: Write down your questions so you remember to ask them during your visits  © 2017 [employer] [employer] Information is for End User's use only and may not be sold, redistributed or otherwise used for commercial purposes  All illustrations and images included in CareNotes® are the copyrighted property of [employer]  or Chandana Devries  The above information is an  only  It is not intended as medical advice for individual conditions or treatments  Talk to your doctor, nurse or pharmacist before following any medical regimen to see if it is safe and effective for you

## 2020-01-24 NOTE — PROGRESS NOTES
The Assessment/Plan     Spider vein of left lower extremity  - Provided handout on varicose veins  - Recommended physical activity as tolerated, adequate hydration, and compression stockings  - Provided vascular surgery referral      Diagnoses and all orders for this visit:    Spider vein of left lower extremity  -     Ambulatory referral to Vascular Surgery; Future    Varicose veins of leg with swelling, bilateral    Chronic bilateral low back pain without sciatica    Somatic dysfunction of lumbar region    Somatic dysfunction of thoracic region         This is a 54 y o  female who presents for OMT follow-up  1  Patient tolerated OMT well for the above problems,  advised patient to drink fluids and can use NSAID for soreness after treatment     2  OMT Follow up in 1 month  Subjective     Selena Harris is a 54 y o  female and is here for a OMT follow up  The patient reports improved low back pain with Tylenol and Ibuprofen since last OMT appointment  She reports intermittent low back pain, denies new or changing character of pain  She was concerned her back pain was related to a UTI so was tested but found no evidence of infection  Her pain is localized to the SI joints and surrounding area, not near costovertebral angle  She is also concerned about painful spider veins on her left posterior/lateral knee  Is the patient taking Pain medication? yes, as above  Has the patient completed physical therapy for this condition? no  Did Patient symptoms improve from last OMT appointment? yes    The following portions of the patient's history were reviewed and updated as appropriate: allergies, current medications, past family history, past medical history, past social history, past surgical history and problem list     Review of Systems  Review of Systems   Constitutional: Negative for activity change, appetite change, chills and fever  HENT: Negative for congestion and sneezing      Respiratory: Negative for cough and shortness of breath  Cardiovascular: Negative for chest pain, palpitations and leg swelling  Gastrointestinal: Negative for abdominal pain, constipation, diarrhea and nausea  Endocrine: Negative for polydipsia and polyuria  Genitourinary: Negative for dysuria, flank pain, frequency and urgency  Musculoskeletal: Positive for back pain  Negative for arthralgias, gait problem, joint swelling and neck pain  Skin: Negative for color change and rash  Neurological: Negative for light-headedness and headaches  Psychiatric/Behavioral: The patient is not nervous/anxious  Objective     OMT Exam   Cervical:   Somatic Dysfunction:  Tissue Texture Changes  Severity :  2  Osteopathic Findings: suboccipital hypertonicity, myofascial restriction  Treatment Method: OAD  Response: improved  Thoracic T1-4:   Somatic Dysfunction:  Tissue Texture Changes and Tenderness  Severity :  2  Osteopathic Findings: R > L paraspinal hypertonicity and posterior tenderpoints  Treatment Method: ME, ST, MFR and CS  Response: improved  Thoracic T5-9:   Somatic Dysfunction:  Tissue Texture Changes, Asymmetry  and Tenderness  Severity :  2  Osteopathic Findings: R > L trapezius hypertonicity/spasm  Treatment Method: ME, ST and MFR  Response: improved  Thoracic T10-12:  Somatic Dysfunction:  Tissue Texture Changes and Tenderness  Severity :  2  Osteopathic Findings: R > L paraspinal hypertonicity   Very tense and tender QL on R side  Treatment Method: ME, ST and MFR  Response: improved  Lumbar:  Somatic Dysfunction:  Tissue Texture Changes and Tenderness  Severity :  2  Osteopathic Findings: R > L SI joint tenderness and surrounding spasm  Treatment Method: ST, MFR and CS  Response: improved

## 2020-01-29 ENCOUNTER — CONSULT (OUTPATIENT)
Dept: VASCULAR SURGERY | Facility: CLINIC | Age: 50
End: 2020-01-29
Payer: COMMERCIAL

## 2020-01-29 VITALS
HEIGHT: 64 IN | TEMPERATURE: 97.7 F | SYSTOLIC BLOOD PRESSURE: 104 MMHG | WEIGHT: 142 LBS | HEART RATE: 80 BPM | DIASTOLIC BLOOD PRESSURE: 74 MMHG | BODY MASS INDEX: 24.24 KG/M2

## 2020-01-29 DIAGNOSIS — I83.92 SPIDER VEIN OF LEFT LOWER EXTREMITY: ICD-10-CM

## 2020-01-29 DIAGNOSIS — I83.893 VARICOSE VEINS OF LEG WITH SWELLING, BILATERAL: Primary | ICD-10-CM

## 2020-01-29 PROCEDURE — 99244 OFF/OP CNSLTJ NEW/EST MOD 40: CPT | Performed by: NURSE PRACTITIONER

## 2020-01-29 NOTE — ASSESSMENT & PLAN NOTE
57yo F with PMH hypothyroid presents to the Vascular office for evaluation of her lower extremity spider veins/varicose veins  Patient with spider veins to thighs and posterior calf on the left leg; she reports discomfort/pain, along with aching, tired legs  She denies any significant swelling, itching, burning to the areas  She denies any DVT/PE, phlebitis, leg injury in the past   She states she has a cousin with large varicose veins  Patient concerned regarding spider veins as she states she also had dry mouth and SOB x 2 days  She denies any s/s of DVT  We discussed the pathophysiology of varicose veins and venous insufficiency  Patient expressed understanding  Will start with conservative measures to aid in symptom relief and progression of disease      PLAN:  -compression stockings 20-30mmHg Rx given, to be worn during waking hours and removed at bedtime  -elevate legs throughout the day as able  -exercise daily as tolerated  -continue healthy lifestyle including low-fat low-chol, low-sodium diet  -apply moisturizer daily to both legs/feet  -return for f/u with me in 3 months to assess conservative management and to discuss treatment plan  -if you have any questions or concerns, please call our office to discuss

## 2020-01-29 NOTE — PROGRESS NOTES
Assessment/Plan:    Spider vein of left lower extremity  55yo F with PMH hypothyroid presents to the Vascular office for evaluation of her lower extremity spider veins/varicose veins  Patient with spider veins to thighs and posterior calf on the left leg; she reports discomfort/pain, along with aching, tired legs  She denies any significant swelling, itching, burning to the areas  She denies any DVT/PE, phlebitis, leg injury in the past   She states she has a cousin with large varicose veins  Patient concerned regarding spider veins as she states she also had dry mouth and SOB x 2 days  She denies any s/s of DVT  We discussed the pathophysiology of varicose veins and venous insufficiency  Patient expressed understanding  Will start with conservative measures to aid in symptom relief and progression of disease  PLAN:  -compression stockings 20-30mmHg Rx given, to be worn during waking hours and removed at bedtime  -elevate legs throughout the day as able  -exercise daily as tolerated  -continue healthy lifestyle including low-fat low-chol, low-sodium diet  -apply moisturizer daily to both legs/feet  -return for f/u with me in 3 months to assess conservative management and to discuss treatment plan  -if you have any questions or concerns, please call our office to discuss         Diagnoses and all orders for this visit:    Varicose veins of leg with swelling, bilateral  -     Compression Stocking    Spider vein of left lower extremity  -     Ambulatory referral to Vascular Surgery  -     Compression Stocking          Subjective:      Patient ID: Danyelle Gibbonsan is a 54 y o  female  New patient, referred by PCP for spider veins  Patient has area of concern to b/l thighs and behind knees L>R  She also reports symptoms of pain, cramping, aching, tired and heaviness  Elevating legs and using Aleve PRN helps w/ relief of symptoms       Wilfridhai Conrad is a 55yo F with PMH hypothyroid who presents to the Vascular office for evaluation of her lower extremity spider veins/varicose veins  Patient with spider veins to thighs and posterior calf on the left leg; she reports discomfort/pain, along with aching, tired legs  She denies any significant swelling, itching, burning to the areas  She denies any DVT/PE, phlebitis, leg injury in the past   She states she has a cousin with large varicose veins  Patient concerned regarding spider veins as she states she also had dry mouth and SOB x 2 days  She denies any s/s of DVT  She is a non-smoker, no hormone therapy  The following portions of the patient's history were reviewed and updated as appropriate: allergies, current medications, past family history, past medical history, past social history, past surgical history and problem list     Review of Systems   Constitutional: Positive for fatigue  HENT: Negative  Eyes: Positive for itching  Respiratory: Positive for chest tightness  Cardiovascular: Negative  Negative for chest pain  Gastrointestinal: Positive for constipation and rectal pain  Endocrine: Negative  Genitourinary: Negative  Musculoskeletal: Positive for back pain and neck stiffness  Leg cramping with walking   Skin: Negative  Allergic/Immunologic: Positive for environmental allergies  Neurological: Positive for weakness and headaches  Hematological: Bruises/bleeds easily  Psychiatric/Behavioral:        Depression          Objective:      /74 (BP Location: Right arm, Patient Position: Sitting)   Pulse 80   Temp 97 7 °F (36 5 °C) (Tympanic)   Ht 5' 4" (1 626 m)   Wt 64 4 kg (142 lb)   BMI 24 37 kg/m²          Physical Exam   Constitutional: She is oriented to person, place, and time  She appears well-developed and well-nourished  HENT:   Head: Normocephalic and atraumatic  Eyes: Pupils are equal, round, and reactive to light  EOM are normal  No scleral icterus  Neck: Normal range of motion  Cardiovascular: Normal rate, regular rhythm, normal heart sounds and intact distal pulses  Pulmonary/Chest: Effort normal and breath sounds normal    Abdominal: Soft  Normal appearance and bowel sounds are normal    Musculoskeletal: Normal range of motion  Neurological: She is alert and oriented to person, place, and time  She has normal strength  Skin: Skin is warm, dry and intact  Capillary refill takes less than 2 seconds  Psychiatric: She has a normal mood and affect  Her speech is normal and behavior is normal  Judgment and thought content normal  Cognition and memory are normal    Nursing note and vitals reviewed  I have reviewed and made appropriate changes to the review of systems input by the medical assistant      Vitals:    01/29/20 1111   BP: 104/74   BP Location: Right arm   Patient Position: Sitting   Pulse: 80   Temp: 97 7 °F (36 5 °C)   TempSrc: Tympanic   Weight: 64 4 kg (142 lb)   Height: 5' 4" (1 626 m)       Patient Active Problem List   Diagnosis    Constipation    Fatigue    Hypothyroidism    Iron deficiency anemia    Joint pain    Dysmenorrhea    Hemorrhoids    Varicose veins of leg with swelling, bilateral    Rectal bleeding    Bilateral low back pain    Irregular menses    Menorrhagia with irregular cycle    Other hydronephrosis    Gynecologic exam normal    Spider vein of left lower extremity       Past Surgical History:   Procedure Laterality Date    COLONOSCOPY  2008       Family History   Problem Relation Age of Onset    No Known Problems Mother     Asthma Father     No Known Problems Sister     No Known Problems Daughter     No Known Problems Maternal Grandmother     No Known Problems Maternal Grandfather     No Known Problems Paternal Grandmother     No Known Problems Paternal Grandfather     No Known Problems Son     No Known Problems Brother        Social History     Socioeconomic History    Marital status: /Civil Union     Spouse name: Not on file    Number of children: Not on file    Years of education: Not on file    Highest education level: Not on file   Occupational History    Not on file   Social Needs    Financial resource strain: Not on file    Food insecurity:     Worry: Not on file     Inability: Not on file    Transportation needs:     Medical: Not on file     Non-medical: Not on file   Tobacco Use    Smoking status: Never Smoker    Smokeless tobacco: Never Used   Substance and Sexual Activity    Alcohol use: No    Drug use: No    Sexual activity: Yes     Partners: Male     Birth control/protection: Condom Male   Lifestyle    Physical activity:     Days per week: Not on file     Minutes per session: Not on file    Stress: Not on file   Relationships    Social connections:     Talks on phone: Not on file     Gets together: Not on file     Attends [employer] service: Not on file     Active member of club or organization: Not on file     Attends meetings of clubs or organizations: Not on file     Relationship status: Not on file    Intimate partner violence:     Fear of current or ex partner: Not on file     Emotionally abused: Not on file     Physically abused: Not on file     Forced sexual activity: Not on file   Other Topics Concern    Not on file   Social History Narrative    Not on file       Allergies   Allergen Reactions    Amoxicillin     Sulfa Antibiotics          Current Outpatient Medications:     docusate sodium (COLACE) 100 mg capsule, Take 1 capsule by mouth 2 (two) times a day as needed, Disp: , Rfl:     ferrous sulfate 325 (65 Fe) mg tablet, Take 1 tablet by mouth Daily, Disp: , Rfl:     levothyroxine 150 mcg tablet, Take 1 tablet (150 mcg total) by mouth daily in the early morning, Disp: 60 tablet, Rfl: 2    loratadine (CLARITIN) 10 mg tablet, Take 1 tablet (10 mg total) by mouth daily, Disp: 90 tablet, Rfl: 1    Multiple Vitamin (MULTIVITAMIN) capsule, Take 1 capsule by mouth daily, Disp: 90 capsule, Rfl: 3    polyethylene glycol (MIRALAX) powder, Take 17 g by mouth daily, Disp: , Rfl:     psyllium (METAMUCIL SMOOTH TEXTURE) 28 % packet, Take 1 packet by mouth 2 (two) times a day, Disp: 30 tablet, Rfl: 2    sodium chloride (OCEAN) 0 65 % nasal spray, 1 spray into each nostril as needed for congestion or rhinitis, Disp: 30 mL, Rfl: 1

## 2020-01-29 NOTE — PATIENT INSTRUCTIONS
We discussed the pathophysiology of varicose veins and venous insufficiency  Will start with conservative measures to aid in symptom relief and progression of disease  PLAN:  -compression stockings 20-30mmHg Rx given, to be worn during waking hours and removed at bedtime  -elevate legs throughout the day as able  -exercise daily as tolerated  -continue healthy lifestyle including low-fat low-chol, low-sodium diet  -apply moisturizer daily to both legs/feet  -return for follow-up after 3 months to discuss treatment plan and reassess conservative treatment  -if you have any questions or concerns, please call our office to discuss      Varicose Veins   AMBULATORY CARE:   Varicose veins  are veins that become large, twisted, and swollen  They are common on the back of the calves, knees, and thighs  Varicose veins are caused by valves in your veins that do not work properly  This causes blood to collect and increase pressure in the veins of your legs  The increased pressure causes your veins to stretch, get larger, swell, and twist        Common symptoms include the following: Your symptoms may be worse after you stand or sit for long periods of time  You may have any of the following:  · Blue, purple, or bulging veins in your legs     · Pain, swelling, or muscle cramps in your legs    · Feeling of fatigue or heaviness in your legs    · Cramping in your legs  Seek care immediately if:   · You have a wound that does not heal or is infected  · You have an injury that has broken your skin and caused your varicose veins to bleed  · Your leg is swollen and hard  · You notice that your legs or feet are turning blue or black  · Your leg feels warm, tender, and painful  It may look swollen and red  Contact your healthcare provider if:   · You have pain in your leg that does not go away or gets worse  · You notice sudden large bruising on your legs  · You have a rash on your leg       · Your symptoms keep you from doing your daily activities  · You have questions or concerns about your condition or care  Treatment of varicose veins  aims to decrease symptoms, improve appearance, and prevent further problems  Treatment will depend on which veins are affected and how severe your condition is  You may need procedures to treat or remove your varicose veins  For example, your healthcare provider may inject a solution or use a laser to close the varicose veins  Surgery to remove long veins may also be done  Ask your healthcare provider for more information about procedures used to treat varicose veins  Manage varicose veins:   · Do not sit or stand for long periods of time  This can cause the blood to collect in your legs and make your symptoms worse  Bend or rotate your ankles several times every hour  Walk around for a few minutes every hour to get blood moving in your legs  · Do not cross your legs when you sit  This decreases blood flow to your feet and can make your symptoms worse  · Do not wear tight clothing or shoes  Do not wear high-heeled shoes  Do not wear clothes that are tight around the waist or knees  · Maintain a healthy weight  Being overweight or obese can make your varicose veins worse  Ask your healthcare provider how much you should weigh  Ask him or her to help you create a weight loss plan if you are overweight  · Wear pressure stockings as directed  The stockings are tight and put pressure on your legs  They improve blood flow and help prevent clots  · Elevate your legs  Keep them above the level of your heart for 15 to 30 minutes several times a day  You can also prop the end of your bed up slightly to elevate your legs while you sleep  This will help blood to flow back to your heart  · Get regular exercise  Talk to your healthcare provider about the best exercise plan for you  Exercise can improve blood flow to your legs and feet    Follow up with your healthcare provider as directed:  Write down your questions so you remember to ask them during your visits  © 2017 [employer] Information is for End User's use only and may not be sold, redistributed or otherwise used for commercial purposes  All illustrations and images included in CareNotes® are the copyrighted property of [employer]  or [employer]  The above information is an  only  It is not intended as medical advice for individual conditions or treatments  Talk to your doctor, nurse or pharmacist before following any medical regimen to see if it is safe and effective for you

## 2020-01-30 NOTE — ASSESSMENT & PLAN NOTE
- Provided handout on varicose veins  - Recommended physical activity as tolerated, adequate hydration, and compression stockings  - Provided vascular surgery referral

## 2020-01-31 PROBLEM — M99.02 SOMATIC DYSFUNCTION OF THORACIC REGION: Status: ACTIVE | Noted: 2020-01-31

## 2020-01-31 PROBLEM — M99.03 SOMATIC DYSFUNCTION OF LUMBAR REGION: Status: ACTIVE | Noted: 2020-01-31

## 2020-02-14 ENCOUNTER — CLINICAL SUPPORT (OUTPATIENT)
Dept: FAMILY MEDICINE CLINIC | Facility: CLINIC | Age: 50
End: 2020-02-14

## 2020-02-14 DIAGNOSIS — Z23 ENCOUNTER FOR IMMUNIZATION: ICD-10-CM

## 2020-02-14 PROCEDURE — 90471 IMMUNIZATION ADMIN: CPT

## 2020-02-14 PROCEDURE — 90686 IIV4 VACC NO PRSV 0.5 ML IM: CPT

## 2020-02-14 PROCEDURE — T1015 CLINIC SERVICE: HCPCS

## 2020-02-25 ENCOUNTER — OFFICE VISIT (OUTPATIENT)
Dept: FAMILY MEDICINE CLINIC | Facility: CLINIC | Age: 50
End: 2020-02-25

## 2020-02-25 VITALS — WEIGHT: 144.2 LBS | BODY MASS INDEX: 24.62 KG/M2 | HEIGHT: 64 IN

## 2020-02-25 DIAGNOSIS — M99.03 SOMATIC DYSFUNCTION OF LUMBAR REGION: ICD-10-CM

## 2020-02-25 DIAGNOSIS — M99.01 SOMATIC DYSFUNCTION OF CERVICAL REGION: ICD-10-CM

## 2020-02-25 DIAGNOSIS — M99.02 SOMATIC DYSFUNCTION OF THORACIC REGION: Primary | ICD-10-CM

## 2020-02-25 PROCEDURE — 3008F BODY MASS INDEX DOCD: CPT | Performed by: NURSE PRACTITIONER

## 2020-02-25 PROCEDURE — 3008F BODY MASS INDEX DOCD: CPT | Performed by: FAMILY MEDICINE

## 2020-02-25 NOTE — PROGRESS NOTES
The Assessment/Plan        Problem List Items Addressed This Visit        Other    Somatic dysfunction of lumbar region    Somatic dysfunction of thoracic region - Primary      Other Visit Diagnoses     Somatic dysfunction of cervical region               This is a 54 y o  female who presents for OMT follow-up  1  Patient tolerated OMT well for the above problems, advised stretching and heat application for continued relief  2  OMT Follow up in 4 weeks  Toshia Dawn is a 54 y o  female and is here for a OMT follow up  The patient reports her back pain has significantly improved, seems to be more on the left due to how she sits to watch TV (turns/looks toward right ) She has had some pain between her shoulder blades over the past couple days  No other new complaints, feel well  Is the patient taking Pain medication? no  Has the patient completed physical therapy for this condition? no  Did Patient symptoms improve from last OMT appointment? yes    The following portions of the patient's history were reviewed and updated as appropriate: allergies, current medications, past family history, past medical history, past social history, past surgical history and problem list     Review of Systems  Review of Systems   Musculoskeletal: Positive for back pain and neck pain  Negative for gait problem  Neurological: Negative for weakness and numbness           Objective     OMT Exam   Cervical:   Somatic Dysfunction:  Tissue Texture Changes, Asymmetry , Restriction and Tenderness  Severity :  2  Osteopathic Findings: Occipital myofascial restriction, paraspinal hypertonicity with myofascial restriction  Treatment Method: ST, MFR and OA release  Response: improved  Thoracic T1-4:   Somatic Dysfunction:  Tissue Texture Changes, Asymmetry , Restriction and Tenderness  Severity :  2  Osteopathic Findings: Trapezius hypertonicity R>L  Treatment Method: ME, ST and MFR  Response: improved  Thoracic T5-9: Somatic Dysfunction:  Tissue Texture Changes, Asymmetry , Restriction and Tenderness  Severity :  2  Osteopathic Findings: Paraspinal hypertonicity L>R, myofascial restriction  Treatment Method: ME, ST and MFR  Response: improved  Lumbar:  Somatic Dysfunction:  Tissue Texture Changes, Asymmetry , Restriction and Tenderness  Severity :  2  Osteopathic Findings: Paraspinal hypertonicity bilaterally, myofascial restriction   Treatment Method: ST and MFR  Response: improved    Les Drop, DO PGY-3  [address]

## 2020-04-02 ENCOUNTER — TELEPHONE (OUTPATIENT)
Dept: FAMILY MEDICINE CLINIC | Facility: CLINIC | Age: 50
End: 2020-04-02

## 2020-04-02 DIAGNOSIS — E03.9 HYPOTHYROIDISM, UNSPECIFIED TYPE: Primary | ICD-10-CM

## 2020-04-02 RX ORDER — LEVOTHYROXINE SODIUM 0.15 MG/1
150 TABLET ORAL
Qty: 90 TABLET | Refills: 2 | Status: SHIPPED | OUTPATIENT
Start: 2020-04-02 | End: 2020-06-03 | Stop reason: ALTCHOICE

## 2020-06-02 NOTE — TELEPHONE ENCOUNTER
Called and left message for patient to return phone call.   Discussed blood work results with patient  TSH high  Increase levothyroxine 137 to 150 mcg  Will prescribe medication to pharmacy  Will repeat TSH in 6 weeks  Patient not taking iron  Advised to take iron daily

## 2020-06-03 ENCOUNTER — APPOINTMENT (OUTPATIENT)
Dept: LAB | Facility: CLINIC | Age: 50
End: 2020-06-03
Payer: COMMERCIAL

## 2020-06-03 ENCOUNTER — TELEPHONE (OUTPATIENT)
Dept: FAMILY MEDICINE CLINIC | Facility: CLINIC | Age: 50
End: 2020-06-03

## 2020-06-03 ENCOUNTER — TRANSCRIBE ORDERS (OUTPATIENT)
Dept: LAB | Facility: CLINIC | Age: 50
End: 2020-06-03

## 2020-06-03 DIAGNOSIS — E03.9 HYPOTHYROIDISM, UNSPECIFIED TYPE: ICD-10-CM

## 2020-06-03 DIAGNOSIS — E03.9 HYPOTHYROIDISM, UNSPECIFIED TYPE: Primary | ICD-10-CM

## 2020-06-03 LAB — TSH SERPL DL<=0.05 MIU/L-ACNC: 0.15 UIU/ML (ref 0.36–3.74)

## 2020-06-03 PROCEDURE — 36415 COLL VENOUS BLD VENIPUNCTURE: CPT

## 2020-06-03 PROCEDURE — 84443 ASSAY THYROID STIM HORMONE: CPT

## 2020-06-03 RX ORDER — LEVOTHYROXINE SODIUM 137 UG/1
137 TABLET ORAL DAILY
Qty: 60 TABLET | Refills: 1 | Status: SHIPPED | OUTPATIENT
Start: 2020-06-03 | End: 2020-10-02 | Stop reason: SDUPTHER

## 2020-08-10 ENCOUNTER — OFFICE VISIT (OUTPATIENT)
Dept: FAMILY MEDICINE CLINIC | Facility: CLINIC | Age: 50
End: 2020-08-10

## 2020-08-10 VITALS
WEIGHT: 141.2 LBS | SYSTOLIC BLOOD PRESSURE: 100 MMHG | RESPIRATION RATE: 18 BRPM | HEART RATE: 80 BPM | DIASTOLIC BLOOD PRESSURE: 70 MMHG | TEMPERATURE: 97.8 F | BODY MASS INDEX: 24.24 KG/M2

## 2020-08-10 DIAGNOSIS — Z13.6 SCREENING FOR CARDIOVASCULAR CONDITION: ICD-10-CM

## 2020-08-10 DIAGNOSIS — D50.9 IRON DEFICIENCY ANEMIA, UNSPECIFIED IRON DEFICIENCY ANEMIA TYPE: ICD-10-CM

## 2020-08-10 DIAGNOSIS — K62.5 RECTAL BLEEDING: ICD-10-CM

## 2020-08-10 DIAGNOSIS — L23.5 ALLERGIC DERMATITIS DUE TO OTHER CHEMICAL PRODUCT: ICD-10-CM

## 2020-08-10 DIAGNOSIS — K64.2 GRADE III HEMORRHOIDS: ICD-10-CM

## 2020-08-10 DIAGNOSIS — R53.83 OTHER FATIGUE: ICD-10-CM

## 2020-08-10 DIAGNOSIS — E03.9 HYPOTHYROIDISM, UNSPECIFIED TYPE: Primary | ICD-10-CM

## 2020-08-10 PROBLEM — N94.6 DYSMENORRHEA: Status: RESOLVED | Noted: 2018-08-16 | Resolved: 2020-08-10

## 2020-08-10 PROBLEM — M99.02 SOMATIC DYSFUNCTION OF THORACIC REGION: Status: RESOLVED | Noted: 2020-01-31 | Resolved: 2020-08-10

## 2020-08-10 PROBLEM — M54.50 BILATERAL LOW BACK PAIN: Status: RESOLVED | Noted: 2019-04-24 | Resolved: 2020-08-10

## 2020-08-10 PROBLEM — L23.9 ALLERGIC CONTACT DERMATITIS: Status: ACTIVE | Noted: 2020-08-10

## 2020-08-10 PROBLEM — Z01.419 GYNECOLOGIC EXAM NORMAL: Status: RESOLVED | Noted: 2019-07-18 | Resolved: 2020-08-10

## 2020-08-10 PROBLEM — N92.1 MENORRHAGIA WITH IRREGULAR CYCLE: Status: RESOLVED | Noted: 2019-06-19 | Resolved: 2020-08-10

## 2020-08-10 PROBLEM — M99.03 SOMATIC DYSFUNCTION OF LUMBAR REGION: Status: RESOLVED | Noted: 2020-01-31 | Resolved: 2020-08-10

## 2020-08-10 PROCEDURE — 1036F TOBACCO NON-USER: CPT | Performed by: FAMILY MEDICINE

## 2020-08-10 PROCEDURE — 99214 OFFICE O/P EST MOD 30 MIN: CPT | Performed by: FAMILY MEDICINE

## 2020-08-10 RX ORDER — CLOBETASOL PROPIONATE 0.5 MG/G
CREAM TOPICAL 2 TIMES DAILY
Qty: 30 G | Refills: 0 | Status: SHIPPED | OUTPATIENT
Start: 2020-08-10 | End: 2021-01-18 | Stop reason: SDUPTHER

## 2020-08-10 NOTE — ASSESSMENT & PLAN NOTE
Patient with history of iron deficiency anemia, last CBC was in 2019 which showed hemoglobin of 11 9  Last menstrual period was in January, other than bleeding from hemorrhoids, no other overt bleeding    Will recheck CBC

## 2020-08-10 NOTE — ASSESSMENT & PLAN NOTE
Patient has hemorrhoids, with rectal bleeding off and on  Will give referral for colorectal surgeon  Advised to avoid constipation, abdominal straining

## 2020-08-10 NOTE — ASSESSMENT & PLAN NOTE
Patient reports increased fatigue, will check CBC, vitamin-D, CMP  Will follow-up once labs are done

## 2020-08-10 NOTE — PROGRESS NOTES
Assessment/Plan     Hypothyroidism  Currently on levothyroxine 137 mcg daily  TSH in June was 0 154, levothyroxine was subsequently 50 used  She is due for TSH  Encouraged her to get labs done  Allergic contact dermatitis  Allergy contact dermatitis both hands, likely secondary to  use  Advised patient to avoid use, and wash hands instead  Prescription for clobetasol given  Also advised to use thick emollients  Follow-up as needed  Hemorrhoids  Patient has hemorrhoids, with rectal bleeding off and on  Will give referral for colorectal surgeon  Advised to avoid constipation, abdominal straining  Iron deficiency anemia  Patient with history of iron deficiency anemia, last CBC was in 2019 which showed hemoglobin of 11 9  Last menstrual period was in January, other than bleeding from hemorrhoids, no other overt bleeding  Will recheck CBC    Fatigue  Patient reports increased fatigue, will check CBC, vitamin-D, CMP  Will follow-up once labs are done  Diagnoses and all orders for this visit:    Hypothyroidism, unspecified type    Grade III hemorrhoids  -     Ambulatory referral to Colorectal Surgery; Future    Rectal bleeding    Iron deficiency anemia, unspecified iron deficiency anemia type  -     CBC and differential; Future    Other fatigue  -     Comprehensive metabolic panel; Future  -     CBC and differential; Future  -     Vitamin D 25 hydroxy; Future    Screening for cardiovascular condition  -     Lipid panel; Future    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 chemical product  -     clobetasol (TEMOVATE) 0 05 % cream; Apply topically 2 (two) times a day    Other orders  -     Cancel: HIV 1/2 Antigen/Antibody (4th Generation) w Reflex [facility]; Future         Subjective     Chief Complaint   Patient presents with    Eczema     both hands, more than 1 month       53-year-old female presented to office for follow-up visit and with complaints of rash on her hands    Patient noticed this rash 2 months ago, and notes association with   She has been using her son's Kenalog, which helps improve the rash, but then rash comes back again  Rash is accompanied with itching  She denies use of any new detergents, soaps, lotions or cosmetic products  Hypothyroidism:  Patient is currently on levothyroxine 137 mcg, which was reduced from 150 mcg 2 months ago  She is due for TSH check  She does have a script  Iron deficiency anemia  Patient used to be on ferrous sulfate for iron-deficiency anemia, takes it off and on  Has not had menstrual bleeding since January of this year but does have bleeding from hemorrhoids off and on  Hemorrhoids:  Patient reports to prolapsed hemorrhoids, with bleeding off and on  Request referral to colorectal surgeon  She also notices that she remains fatigued all the time, with decreased energy  The following portions of the patient's history were reviewed and updated as appropriate: allergies, current medications, past family history, past medical history, past social history, past surgical history and problem list     Review of Systems   Constitutional: Positive for fatigue  Negative for activity change, chills and fever  HENT: Negative for congestion  Respiratory: Negative for cough, shortness of breath and wheezing  Cardiovascular: Negative for chest pain  Gastrointestinal: Negative for abdominal pain, diarrhea, nausea and vomiting  Genitourinary: Negative for difficulty urinating  Skin: Positive for rash  Allergic/Immunologic: Positive for environmental allergies and food allergies  Neurological: Negative for headaches  Objective     Vitals:Blood pressure 100/70, pulse 80, temperature 97 8 °F (36 6 °C), temperature source Tympanic, resp  rate 18, weight 64 kg (141 lb 3 2 oz), not currently breastfeeding  Physical Exam:  Physical Exam  Vitals signs reviewed  Constitutional:       General: She is not in acute distress       Appearance: She is well-developed  She is not diaphoretic  HENT:      Head: Normocephalic and atraumatic  Right Ear: External ear normal       Left Ear: External ear normal    Eyes:      Conjunctiva/sclera: Conjunctivae normal       Pupils: Pupils are equal, round, and reactive to light  Neck:      Musculoskeletal: Normal range of motion and neck supple  Cardiovascular:      Rate and Rhythm: Normal rate and regular rhythm  Heart sounds: Normal heart sounds  No murmur  No friction rub  Pulmonary:      Effort: Pulmonary effort is normal  No respiratory distress  Breath sounds: Normal breath sounds  No wheezing or rales  Abdominal:      General: Bowel sounds are normal  There is no distension  Palpations: Abdomen is soft  Tenderness: There is no abdominal tenderness  Musculoskeletal: Normal range of motion  Skin:     General: Skin is warm and dry  Comments: Scaly rash with cracking of skin on dorsum of fingers, volar aspect of wrist   Neurological:      Mental Status: She is alert and oriented to person, place, and time

## 2020-08-10 NOTE — ASSESSMENT & PLAN NOTE
Currently on levothyroxine 137 mcg daily  TSH in June was 0 154, levothyroxine was subsequently 50 used  She is due for TSH  Encouraged her to get labs done

## 2020-08-10 NOTE — ASSESSMENT & PLAN NOTE
Allergy contact dermatitis both hands, likely secondary to  use  Advised patient to avoid use, and wash hands instead  Prescription for clobetasol given  Also advised to use thick emollients  Follow-up as needed

## 2020-08-12 ENCOUNTER — TRANSCRIBE ORDERS (OUTPATIENT)
Dept: LAB | Facility: CLINIC | Age: 50
End: 2020-08-12

## 2020-08-12 ENCOUNTER — LAB (OUTPATIENT)
Dept: LAB | Facility: CLINIC | Age: 50
End: 2020-08-12
Payer: COMMERCIAL

## 2020-08-12 DIAGNOSIS — D50.9 IRON DEFICIENCY ANEMIA, UNSPECIFIED IRON DEFICIENCY ANEMIA TYPE: ICD-10-CM

## 2020-08-12 DIAGNOSIS — R53.83 OTHER FATIGUE: ICD-10-CM

## 2020-08-12 DIAGNOSIS — E03.9 HYPOTHYROIDISM, UNSPECIFIED TYPE: ICD-10-CM

## 2020-08-12 DIAGNOSIS — Z13.6 SCREENING FOR CARDIOVASCULAR CONDITION: ICD-10-CM

## 2020-08-12 LAB
25(OH)D3 SERPL-MCNC: 28.6 NG/ML (ref 30–100)
ALBUMIN SERPL BCP-MCNC: 3.4 G/DL (ref 3.5–5)
ALP SERPL-CCNC: 69 U/L (ref 46–116)
ALT SERPL W P-5'-P-CCNC: 21 U/L (ref 12–78)
ANION GAP SERPL CALCULATED.3IONS-SCNC: 7 MMOL/L (ref 4–13)
AST SERPL W P-5'-P-CCNC: 19 U/L (ref 5–45)
BASOPHILS # BLD AUTO: 0.04 THOUSANDS/ΜL (ref 0–0.1)
BASOPHILS NFR BLD AUTO: 1 % (ref 0–1)
BILIRUB SERPL-MCNC: 0.17 MG/DL (ref 0.2–1)
BUN SERPL-MCNC: 12 MG/DL (ref 5–25)
CALCIUM SERPL-MCNC: 9.1 MG/DL (ref 8.3–10.1)
CHLORIDE SERPL-SCNC: 105 MMOL/L (ref 100–108)
CHOLEST SERPL-MCNC: 194 MG/DL (ref 50–200)
CO2 SERPL-SCNC: 31 MMOL/L (ref 21–32)
CREAT SERPL-MCNC: 0.82 MG/DL (ref 0.6–1.3)
EOSINOPHIL # BLD AUTO: 0.53 THOUSAND/ΜL (ref 0–0.61)
EOSINOPHIL NFR BLD AUTO: 9 % (ref 0–6)
ERYTHROCYTE [DISTWIDTH] IN BLOOD BY AUTOMATED COUNT: 14.6 % (ref 11.6–15.1)
GFR SERPL CREATININE-BSD FRML MDRD: 85 ML/MIN/1.73SQ M
GLUCOSE P FAST SERPL-MCNC: 81 MG/DL (ref 65–99)
HCT VFR BLD AUTO: 38.5 % (ref 34.8–46.1)
HDLC SERPL-MCNC: 45 MG/DL
HGB BLD-MCNC: 12 G/DL (ref 11.5–15.4)
IMM GRANULOCYTES # BLD AUTO: 0.01 THOUSAND/UL (ref 0–0.2)
IMM GRANULOCYTES NFR BLD AUTO: 0 % (ref 0–2)
LDLC SERPL CALC-MCNC: 117 MG/DL (ref 0–100)
LYMPHOCYTES # BLD AUTO: 2.24 THOUSANDS/ΜL (ref 0.6–4.47)
LYMPHOCYTES NFR BLD AUTO: 36 % (ref 14–44)
MCH RBC QN AUTO: 26.3 PG (ref 26.8–34.3)
MCHC RBC AUTO-ENTMCNC: 31.2 G/DL (ref 31.4–37.4)
MCV RBC AUTO: 84 FL (ref 82–98)
MONOCYTES # BLD AUTO: 0.45 THOUSAND/ΜL (ref 0.17–1.22)
MONOCYTES NFR BLD AUTO: 7 % (ref 4–12)
NEUTROPHILS # BLD AUTO: 2.94 THOUSANDS/ΜL (ref 1.85–7.62)
NEUTS SEG NFR BLD AUTO: 47 % (ref 43–75)
NONHDLC SERPL-MCNC: 149 MG/DL
NRBC BLD AUTO-RTO: 0 /100 WBCS
PLATELET # BLD AUTO: 303 THOUSANDS/UL (ref 149–390)
PMV BLD AUTO: 9.1 FL (ref 8.9–12.7)
POTASSIUM SERPL-SCNC: 4.2 MMOL/L (ref 3.5–5.3)
PROT SERPL-MCNC: 8.5 G/DL (ref 6.4–8.2)
RBC # BLD AUTO: 4.57 MILLION/UL (ref 3.81–5.12)
SODIUM SERPL-SCNC: 143 MMOL/L (ref 136–145)
TRIGL SERPL-MCNC: 160 MG/DL
TSH SERPL DL<=0.05 MIU/L-ACNC: 0.45 UIU/ML (ref 0.36–3.74)
WBC # BLD AUTO: 6.21 THOUSAND/UL (ref 4.31–10.16)

## 2020-08-12 PROCEDURE — 85025 COMPLETE CBC W/AUTO DIFF WBC: CPT

## 2020-08-12 PROCEDURE — 36415 COLL VENOUS BLD VENIPUNCTURE: CPT

## 2020-08-12 PROCEDURE — 80061 LIPID PANEL: CPT

## 2020-08-12 PROCEDURE — 80053 COMPREHEN METABOLIC PANEL: CPT

## 2020-08-12 PROCEDURE — 82306 VITAMIN D 25 HYDROXY: CPT

## 2020-08-12 PROCEDURE — 84443 ASSAY THYROID STIM HORMONE: CPT

## 2020-09-01 ENCOUNTER — ANNUAL EXAM (OUTPATIENT)
Dept: OBGYN CLINIC | Facility: CLINIC | Age: 50
End: 2020-09-01
Payer: COMMERCIAL

## 2020-09-01 VITALS
BODY MASS INDEX: 24.24 KG/M2 | SYSTOLIC BLOOD PRESSURE: 112 MMHG | HEIGHT: 64 IN | DIASTOLIC BLOOD PRESSURE: 74 MMHG | TEMPERATURE: 97.7 F | WEIGHT: 142 LBS

## 2020-09-01 DIAGNOSIS — Z12.31 ENCOUNTER FOR SCREENING MAMMOGRAM FOR BREAST CANCER: ICD-10-CM

## 2020-09-01 DIAGNOSIS — R10.2 PELVIC PAIN: ICD-10-CM

## 2020-09-01 DIAGNOSIS — Z01.419 GYNECOLOGIC EXAM NORMAL: Primary | ICD-10-CM

## 2020-09-01 PROCEDURE — 1036F TOBACCO NON-USER: CPT | Performed by: PHYSICIAN ASSISTANT

## 2020-09-01 PROCEDURE — 99396 PREV VISIT EST AGE 40-64: CPT | Performed by: PHYSICIAN ASSISTANT

## 2020-09-01 NOTE — ASSESSMENT & PLAN NOTE
Pap guidelines reviewed  Pap deferred secondary to negative pap and HPV in 2019 in this low risk patient  Reviewed pelvic discomfort with patient  Will get pelvic ultrasound to evaluate fibroids and to see if left ovarian cyst cleared  Script given  Reviewed 1 year no menses equals menopause  Would recommend if does not have any menstrual like bleeding until February 2021 would consider menopausal and if has bleeding after that time should call office to be evaluated for  bleeding  I would not count that one episode of small blood tinge discharge when wiped once in August 2020 as a menses  Reviewed lifestyle modifications through diet and exercise to help with hot flashes and other menopausal symptoms  Continue to follow with PCP for routine blood work and thyroid management  Reviewed coconut oil as well as other OTC lubrications to help with vaginal dryness  Offered HRT patient not interested at this time  Aware if using condoms should use only water based lubricants  Return to office for annual or as needed

## 2020-09-01 NOTE — PROGRESS NOTES
Assessment/Plan   Problem List Items Addressed This Visit        Other    Gynecologic exam normal - Primary     Pap guidelines reviewed  Pap deferred secondary to negative pap and HPV in 2019 in this low risk patient  Reviewed pelvic discomfort with patient  Will get pelvic ultrasound to evaluate fibroids and to see if left ovarian cyst cleared  Script given  Reviewed 1 year no menses equals menopause  Would recommend if does not have any menstrual like bleeding until February 2021 would consider menopausal and if has bleeding after that time should call office to be evaluated for  bleeding  I would not count that one episode of small blood tinge discharge when wiped once in August 2020 as a menses  Reviewed lifestyle modifications through diet and exercise to help with hot flashes and other menopausal symptoms  Continue to follow with PCP for routine blood work and thyroid management  Reviewed coconut oil as well as other OTC lubrications to help with vaginal dryness  Offered HRT patient not interested at this time  Aware if using condoms should use only water based lubricants  Return to office for annual or as needed  Other Visit Diagnoses     Encounter for screening mammogram for breast cancer        Relevant Orders    Mammo screening bilateral w cad    Pelvic pain        Relevant Orders    US pelvis complete w transvaginal          Subjective:     Patient ID: Dayanna Hdz is a 52 y o  y o  female  HPI  53 yo seen for annual exam  Reports LMP was 2/2020  Did have one episode of spotting just when wiped one time 8/15/2020  No bleeding since  Does report hot flashes, worse at night  Was having palpitations at night and anxiety but was found that her TSH was too low and has improved since she had her Levothyroxine decreased  Reports hot flashes are tolerable  Also with vaginal dryness and pain especially after intercourse in vaginal canal  Was using condoms but is now not using anymore  Patient reports typically with her menses and it also occurred with episode of bleeding in 8/2020 has had left sided pelvic and hip pain  Pelvic ultrasound done on 6/21/2019 showed small right sided corpus luteum cyst and 3cm left sided ovarian cyst, as well as 2 2 x 2 0 x 1 9 cm partially subserosal fibroid  Anterior intramural fibroid measuring 1 8 x 1 8 x 1 7cm  Patient denies any pain outside of menses or bleeding  Denies bladder issues  Does have issues with constipation and hemorrhoids, follows with GI  Last pap: 7/18/2019 NILM (-)HRHPV  Last mammogram: 1/2/2020 BIRADS 1: Negative  The following portions of the patient's history were reviewed and updated as appropriate:   She  has a past medical history of Allergic, Anemia, Disease of thyroid gland, Dyspareunia in female, Fatigue, Hemorrhoids, Hypothyroidism, Rectal bleeding, and Varicose veins of both legs with edema  She   Patient Active Problem List    Diagnosis Date Noted    Gynecologic exam normal 09/01/2020    Allergic contact dermatitis 08/10/2020    Spider vein of left lower extremity 01/29/2020    Other hydronephrosis 07/02/2019    Irregular menses 06/19/2019    Rectal bleeding 03/06/2019    Hemorrhoids 02/19/2019    Screening for cardiovascular condition 03/16/2018    Constipation 07/26/2013    Fatigue 01/11/2013    Hypothyroidism 01/11/2013    Iron deficiency anemia 01/11/2013     She  has a past surgical history that includes Colonoscopy (2008)  Her family history includes Asthma in her father; Hypertension in her mother; No Known Problems in her brother, daughter, maternal grandfather, maternal grandmother, paternal grandfather, paternal grandmother, sister, and son  She  reports that she has never smoked  She has never used smokeless tobacco  She reports that she does not drink alcohol or use drugs    Current Outpatient Medications   Medication Sig Dispense Refill    clobetasol (TEMOVATE) 0 05 % cream Apply topically 2 (two) times a day 30 g 0    docusate sodium (COLACE) 100 mg capsule Take 1 capsule by mouth 2 (two) times a day as needed      levothyroxine 137 mcg tablet Take 1 tablet (137 mcg total) by mouth daily 60 tablet 1    loratadine (CLARITIN) 10 mg tablet Take 1 tablet (10 mg total) by mouth daily 90 tablet 1    Multiple Vitamin (MULTIVITAMIN) capsule Take 1 capsule by mouth daily 90 capsule 3    polyethylene glycol (MIRALAX) powder Take 17 g by mouth daily      ferrous sulfate 325 (65 Fe) mg tablet Take 1 tablet by mouth Daily      psyllium (METAMUCIL SMOOTH TEXTURE) 28 % packet Take 1 packet by mouth 2 (two) times a day (Patient not taking: Reported on 8/10/2020) 30 tablet 2    sodium chloride (OCEAN) 0 65 % nasal spray 1 spray into each nostril as needed for congestion or rhinitis (Patient not taking: Reported on 8/10/2020) 30 mL 1     No current facility-administered medications for this visit  She is allergic to amoxicillin; kiwi extract; and sulfa antibiotics       Menstrual History:  OB History        2    Para   2    Term   2            AB        Living   2       SAB        TAB        Ectopic        Multiple        Live Births   2               Patient's last menstrual period was 08/15/2020 (approximate)  Review of Systems   Constitutional: Negative for fatigue, fever and unexpected weight change  HENT: Negative for dental problem and sinus pressure  Eyes: Negative for visual disturbance  Respiratory: Negative for cough, shortness of breath and wheezing  Cardiovascular: Negative for chest pain  Gastrointestinal: Negative for abdominal pain, blood in stool, constipation, diarrhea, nausea and vomiting  Endocrine: Negative for polydipsia  Genitourinary: Positive for pelvic pain  Negative for difficulty urinating, dyspareunia, dysuria, frequency, hematuria and urgency  Musculoskeletal: Negative for arthralgias and back pain     Neurological: Negative for dizziness, seizures, light-headedness and headaches  Psychiatric/Behavioral: Negative for suicidal ideas  The patient is not nervous/anxious  Objective:  Vitals:    09/01/20 1423   BP: 112/74   BP Location: Left arm   Patient Position: Sitting   Cuff Size: Standard   Temp: 97 7 °F (36 5 °C)   Weight: 64 4 kg (142 lb)   Height: 5' 4" (1 626 m)      Physical Exam  Constitutional:       Appearance: Normal appearance  She is well-developed  Genitourinary:      Pelvic exam was performed with patient supine  Vulva, urethra, bladder, vagina, uterus and rectum normal       No vulval condylomata, lesion, tenderness, ulcerations, Bartholin's cyst or rash noted  No signs of labial injury  No labial fusion  No inguinal adenopathy present in the right or left side  No urethral prolapse, pain, swelling, tenderness, caruncle, mass or diverticulum present  Bladder is not distended or tender  No signs of injury or lesions in the vagina  No vaginal discharge, erythema, tenderness or bleeding  No cervical motion tenderness, discharge or lesion  Uterus is not enlarged, tender, irregular or mobile  No uterine mass detected  No right or left adnexal mass present  Right adnexa not tender or full  Left adnexa not tender or full  Rectum:      Guaiac result negative  No rectal mass, anal fissure, tenderness, external hemorrhoid, internal hemorrhoid or abnormal anal tone  HENT:      Head: Normocephalic and atraumatic  Neck:      Thyroid: No thyromegaly  Cardiovascular:      Rate and Rhythm: Normal rate and regular rhythm  Heart sounds: Normal heart sounds  No murmur  No friction rub  No gallop  Pulmonary:      Effort: Pulmonary effort is normal  No respiratory distress  Breath sounds: Normal breath sounds  No wheezing     Chest:      Breasts: Breasts are symmetrical          Right: Normal  No swelling, bleeding, inverted nipple, mass, nipple discharge, skin change or tenderness  Left: Normal  No swelling, bleeding, inverted nipple, mass, nipple discharge, skin change or tenderness  Abdominal:      General: There is no distension  Palpations: Abdomen is soft  There is no mass  Tenderness: There is no abdominal tenderness  There is no guarding or rebound  Hernia: No hernia is present  Lymphadenopathy:      Cervical: No cervical adenopathy  Upper Body:      Right upper body: No supraclavicular, axillary or pectoral adenopathy  Left upper body: No supraclavicular, axillary or pectoral adenopathy  Lower Body: No right inguinal adenopathy  No left inguinal adenopathy  Neurological:      Mental Status: She is alert and oriented to person, place, and time  Skin:     General: Skin is warm and dry     Psychiatric:         Behavior: Behavior normal

## 2020-09-11 ENCOUNTER — OFFICE VISIT (OUTPATIENT)
Dept: FAMILY MEDICINE CLINIC | Facility: CLINIC | Age: 50
End: 2020-09-11

## 2020-09-11 VITALS
SYSTOLIC BLOOD PRESSURE: 110 MMHG | TEMPERATURE: 98.7 F | BODY MASS INDEX: 24.24 KG/M2 | HEIGHT: 64 IN | HEART RATE: 82 BPM | DIASTOLIC BLOOD PRESSURE: 70 MMHG | WEIGHT: 142 LBS | RESPIRATION RATE: 16 BRPM

## 2020-09-11 DIAGNOSIS — E03.9 HYPOTHYROIDISM, UNSPECIFIED TYPE: Primary | ICD-10-CM

## 2020-09-11 DIAGNOSIS — E88.09 HYPERPROTEINEMIA: ICD-10-CM

## 2020-09-11 DIAGNOSIS — R41.3 MEMORY CHANGE: ICD-10-CM

## 2020-09-11 DIAGNOSIS — R53.83 OTHER FATIGUE: ICD-10-CM

## 2020-09-11 DIAGNOSIS — Z23 ENCOUNTER FOR IMMUNIZATION: ICD-10-CM

## 2020-09-11 PROCEDURE — 99214 OFFICE O/P EST MOD 30 MIN: CPT | Performed by: FAMILY MEDICINE

## 2020-09-11 PROCEDURE — 1036F TOBACCO NON-USER: CPT | Performed by: FAMILY MEDICINE

## 2020-09-11 PROCEDURE — 90682 RIV4 VACC RECOMBINANT DNA IM: CPT | Performed by: FAMILY MEDICINE

## 2020-09-11 PROCEDURE — 3725F SCREEN DEPRESSION PERFORMED: CPT | Performed by: FAMILY MEDICINE

## 2020-09-11 PROCEDURE — 90471 IMMUNIZATION ADMIN: CPT | Performed by: FAMILY MEDICINE

## 2020-09-11 PROCEDURE — 3008F BODY MASS INDEX DOCD: CPT | Performed by: FAMILY MEDICINE

## 2020-09-11 NOTE — ASSESSMENT & PLAN NOTE
TSH 0 45 on current dose of levothyroxine 137 mcg daily  Will continue current dose and repeat TSH in 3 months

## 2020-09-11 NOTE — ASSESSMENT & PLAN NOTE
Reviewed CBC, CMP, vitamin-D with patient  Hemoglobin 12, CMP unremarkable except for elevated protein  Will repeat CMP in 3 months  Vitamin-D level 28, advised to continue over-the-counter supplements  GA D in PHQ 9 score 5 and 4 respectively  Patient is also undergoing menopausal symptoms, and could be fatigue secondary to to that  Patient will follow up in 3 months, if continues to have symptoms, will start SSRI    If protein continues to be elevated in next CMP, will check urine and serum electrophoresis

## 2020-09-11 NOTE — PROGRESS NOTES
Assessment/Plan     Hypothyroidism  TSH 0 45 on current dose of levothyroxine 137 mcg daily  Will continue current dose and repeat TSH in 3 months  Fatigue  Reviewed CBC, CMP, vitamin-D with patient  Hemoglobin 12, CMP unremarkable except for elevated protein  Will repeat CMP in 3 months  Vitamin-D level 28, advised to continue over-the-counter supplements  GA D in PHQ 9 score 5 and 4 respectively  Patient is also undergoing menopausal symptoms, and could be fatigue secondary to to that  Patient will follow up in 3 months, if continues to have symptoms, will start SSRI  If protein continues to be elevated in next CMP, will check urine and serum electrophoresis    Memory change  Will check vitamin B12 level     Diagnoses and all orders for this visit:    Hypothyroidism, unspecified type  -     Cancel: TSH, 3rd generation with Free T4 reflex; Future  -     TSH, 3rd generation with Free T4 reflex; Future    Hyperproteinemia  -     Cancel: Comprehensive metabolic panel; Future  -     Comprehensive metabolic panel; Future    Memory change  -     Vitamin B12; Future    Encounter for immunization  -     influenza vaccine, quadrivalent, recombinant, PF, 0 5 mL, for patients 18 yr+ (FLUBLOK)    Other fatigue         Subjective     Chief Complaint   Patient presents with    Results       28-year-old female presented to office for follow-up visit and review of blood work  She was seen a month ago, her levothyroxine dose was recently changed and was advised to do blood work  Patient today reports that she continues to feel fatigued, she is also perimenopausal, and does have hot flashes sometimes  She also complains of memory impairment  She feels depressed at times, but denies any suicidal or homicidal ideation  She was advised to follow up with colorectal surgeon for hemorrhoids, but has not done yet  She will be making appointment soon  She does not have any other concerns        The following portions of the patient's history were reviewed and updated as appropriate: allergies, current medications, past family history, past medical history, past social history, past surgical history and problem list     Review of Systems   Constitutional: Positive for fatigue  Negative for activity change, chills and fever  HENT: Negative for congestion  Respiratory: Negative for cough and shortness of breath  Cardiovascular: Negative for chest pain  Gastrointestinal: Negative for abdominal pain, diarrhea, nausea and vomiting  Genitourinary: Negative for difficulty urinating  Neurological: Negative for dizziness and headaches  Objective     Vitals:Blood pressure 110/70, pulse 82, temperature 98 7 °F (37 1 °C), resp  rate 16, height 5' 4" (1 626 m), weight 64 4 kg (142 lb), last menstrual period 08/15/2020, not currently breastfeeding  Physical Exam:  Physical Exam  Nursing note reviewed  Constitutional:       Appearance: She is well-developed  HENT:      Head: Normocephalic and atraumatic  Right Ear: External ear normal       Left Ear: External ear normal       Nose:      Comments: Wearing mask  Eyes:      Conjunctiva/sclera: Conjunctivae normal       Pupils: Pupils are equal, round, and reactive to light  Neck:      Musculoskeletal: Normal range of motion and neck supple  Cardiovascular:      Rate and Rhythm: Normal rate and regular rhythm  Heart sounds: Normal heart sounds  No murmur  No friction rub  Pulmonary:      Effort: Pulmonary effort is normal  No respiratory distress  Breath sounds: Normal breath sounds  No wheezing or rales  Abdominal:      General: Bowel sounds are normal  There is no distension  Palpations: Abdomen is soft  Tenderness: There is no abdominal tenderness  Musculoskeletal: Normal range of motion  Skin:     General: Skin is warm and dry  Neurological:      Mental Status: She is alert and oriented to person, place, and time

## 2020-10-02 DIAGNOSIS — E03.9 HYPOTHYROIDISM, UNSPECIFIED TYPE: ICD-10-CM

## 2020-10-02 RX ORDER — LEVOTHYROXINE SODIUM 137 UG/1
TABLET ORAL
Qty: 90 TABLET | Refills: 1 | OUTPATIENT
Start: 2020-10-02

## 2020-10-02 RX ORDER — LEVOTHYROXINE SODIUM 137 UG/1
137 TABLET ORAL DAILY
Qty: 60 TABLET | Refills: 1 | Status: SHIPPED | OUTPATIENT
Start: 2020-10-02 | End: 2021-01-30 | Stop reason: SDUPTHER

## 2020-12-29 ENCOUNTER — TRANSCRIBE ORDERS (OUTPATIENT)
Dept: LAB | Facility: CLINIC | Age: 50
End: 2020-12-29

## 2020-12-29 ENCOUNTER — LAB (OUTPATIENT)
Dept: LAB | Facility: CLINIC | Age: 50
End: 2020-12-29
Payer: COMMERCIAL

## 2020-12-29 DIAGNOSIS — R41.3 MEMORY CHANGE: ICD-10-CM

## 2020-12-29 DIAGNOSIS — E03.9 HYPOTHYROIDISM, UNSPECIFIED TYPE: ICD-10-CM

## 2020-12-29 DIAGNOSIS — E88.09 HYPERPROTEINEMIA: ICD-10-CM

## 2020-12-29 LAB
ALBUMIN SERPL BCP-MCNC: 3.5 G/DL (ref 3.5–5)
ALP SERPL-CCNC: 76 U/L (ref 46–116)
ALT SERPL W P-5'-P-CCNC: 27 U/L (ref 12–78)
ANION GAP SERPL CALCULATED.3IONS-SCNC: 10 MMOL/L (ref 4–13)
AST SERPL W P-5'-P-CCNC: 22 U/L (ref 5–45)
BILIRUB SERPL-MCNC: 0.35 MG/DL (ref 0.2–1)
BUN SERPL-MCNC: 13 MG/DL (ref 5–25)
CALCIUM SERPL-MCNC: 9.5 MG/DL (ref 8.3–10.1)
CHLORIDE SERPL-SCNC: 104 MMOL/L (ref 100–108)
CO2 SERPL-SCNC: 28 MMOL/L (ref 21–32)
CREAT SERPL-MCNC: 0.79 MG/DL (ref 0.6–1.3)
GFR SERPL CREATININE-BSD FRML MDRD: 89 ML/MIN/1.73SQ M
GLUCOSE P FAST SERPL-MCNC: 82 MG/DL (ref 65–99)
POTASSIUM SERPL-SCNC: 3.8 MMOL/L (ref 3.5–5.3)
PROT SERPL-MCNC: 8.6 G/DL (ref 6.4–8.2)
SODIUM SERPL-SCNC: 142 MMOL/L (ref 136–145)
TSH SERPL DL<=0.05 MIU/L-ACNC: 0.93 UIU/ML (ref 0.36–3.74)
VIT B12 SERPL-MCNC: 430 PG/ML (ref 100–900)

## 2020-12-29 PROCEDURE — 84443 ASSAY THYROID STIM HORMONE: CPT

## 2020-12-29 PROCEDURE — 82607 VITAMIN B-12: CPT

## 2020-12-29 PROCEDURE — 36415 COLL VENOUS BLD VENIPUNCTURE: CPT

## 2020-12-29 PROCEDURE — 80053 COMPREHEN METABOLIC PANEL: CPT

## 2021-01-14 NOTE — PROGRESS NOTES
Assessment/Plan:    Hemorrhoids  Recommended high-fiber diet  May use hydrocortisone 2 5% cream as needed    Hypothyroidism  TSH 0 925  Continue levothyroxine 137 mcg daily, 1/2 hour before breakfast   Will repeat TSH in 3 months, if normal will continue with current dose    Allergic contact dermatitis  Improved foot clobetasol, refill for clobetasol given  Advised to use thick emollients    Vitamin D deficiency  Vitamin-D level in August 28 6, recommended daily 1200 internationally units of vitamin-D  Also encouraged weight bearing exercise       Diagnoses and all orders for this visit:    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 chemical product  -     clobetasol (TEMOVATE) 0 05 % cream; Apply topically 2 (two) times a day    Hypothyroidism, unspecified type  -     TSH, 3rd generation with Free T4 reflex; Future    Grade III hemorrhoids  -     hydrocortisone 2 5 % cream; Apply topically 2 (two) times a day    Vitamin D deficiency  -     Vitamin D 25 hydroxy; Future    Rash  -     nystatin (MYCOSTATIN) cream; Apply topically 2 (two) times a day    Other orders  -     Cancel: HIV 1/2 Antigen/Antibody (4th Generation) w Reflex [facility]; Future          Subjective:      Patient ID: Aurelio Leroy is a 52 y o  female  65-year-old presented to office for review of blood work and for refill of clobetasol  Patient reports she developed rash on her hands because of use of , which improved with clobetasol but returned on discontinuation  She also reports rash after use of hair dye, and she usually takes Claritin on the day of application  She has noticed lesion on her ear recently, which is itchy which previously improved with clobetasol  She needs refill for clobetasol today  She also wants to discuss recent blood work        The following portions of the patient's history were reviewed and updated as appropriate: allergies, current medications, past family history, past medical history, past social history, past surgical history and problem list     Review of Systems   Constitutional: Negative for activity change, chills and fever  HENT: Negative for congestion  Respiratory: Negative for shortness of breath  Cardiovascular: Negative for chest pain  Gastrointestinal: Negative for diarrhea, nausea and vomiting  Genitourinary: Negative for difficulty urinating  Musculoskeletal: Negative for arthralgias and back pain  Skin: Positive for rash  Neurological: Negative for headaches  Objective:      /70 (BP Location: Left arm, Patient Position: Sitting, Cuff Size: Standard)   Pulse 88   Temp 97 7 °F (36 5 °C) (Tympanic)   Resp 18   Ht 5' 4" (1 626 m)   Wt 66 2 kg (146 lb)   SpO2 96%   BMI 25 06 kg/m²          Physical Exam  Vitals signs reviewed  Constitutional:       Appearance: Normal appearance  She is not ill-appearing  HENT:      Head: Normocephalic and atraumatic  Eyes:      Conjunctiva/sclera: Conjunctivae normal    Neck:      Musculoskeletal: Normal range of motion  Cardiovascular:      Rate and Rhythm: Normal rate  Heart sounds: No murmur  Pulmonary:      Effort: Pulmonary effort is normal  No respiratory distress  Musculoskeletal: Normal range of motion  Skin:     General: Skin is warm  Capillary Refill: Capillary refill takes less than 2 seconds  Comments: Papular rash at the back of neck, erythematous lesion on the ear  Scaly erythematous rash on palms of hands   Neurological:      Mental Status: She is alert

## 2021-01-18 ENCOUNTER — OFFICE VISIT (OUTPATIENT)
Dept: FAMILY MEDICINE CLINIC | Facility: CLINIC | Age: 51
End: 2021-01-18

## 2021-01-18 VITALS
BODY MASS INDEX: 24.92 KG/M2 | HEART RATE: 88 BPM | RESPIRATION RATE: 18 BRPM | DIASTOLIC BLOOD PRESSURE: 70 MMHG | HEIGHT: 64 IN | TEMPERATURE: 97.7 F | SYSTOLIC BLOOD PRESSURE: 110 MMHG | WEIGHT: 146 LBS | OXYGEN SATURATION: 96 %

## 2021-01-18 DIAGNOSIS — R21 RASH: ICD-10-CM

## 2021-01-18 DIAGNOSIS — L23.5 ALLERGIC DERMATITIS DUE TO OTHER CHEMICAL PRODUCT: Primary | ICD-10-CM

## 2021-01-18 DIAGNOSIS — E03.9 HYPOTHYROIDISM, UNSPECIFIED TYPE: ICD-10-CM

## 2021-01-18 DIAGNOSIS — K64.2 GRADE III HEMORRHOIDS: ICD-10-CM

## 2021-01-18 DIAGNOSIS — E55.9 VITAMIN D DEFICIENCY: ICD-10-CM

## 2021-01-18 PROCEDURE — 1036F TOBACCO NON-USER: CPT | Performed by: FAMILY MEDICINE

## 2021-01-18 PROCEDURE — 99214 OFFICE O/P EST MOD 30 MIN: CPT | Performed by: FAMILY MEDICINE

## 2021-01-18 PROCEDURE — 3008F BODY MASS INDEX DOCD: CPT | Performed by: FAMILY MEDICINE

## 2021-01-18 RX ORDER — NYSTATIN 100000 U/G
CREAM TOPICAL 2 TIMES DAILY
Qty: 30 G | Refills: 0 | Status: SHIPPED | OUTPATIENT
Start: 2021-01-18

## 2021-01-18 RX ORDER — CLOBETASOL PROPIONATE 0.5 MG/G
CREAM TOPICAL 2 TIMES DAILY
Qty: 30 G | Refills: 0 | Status: SHIPPED | OUTPATIENT
Start: 2021-01-18 | End: 2021-07-29 | Stop reason: SDUPTHER

## 2021-01-18 NOTE — ASSESSMENT & PLAN NOTE
TSH 0 925    Continue levothyroxine 137 mcg daily, 1/2 hour before breakfast   Will repeat TSH in 3 months, if normal will continue with current dose

## 2021-01-18 NOTE — ASSESSMENT & PLAN NOTE
Vitamin-D level in August 28 6, recommended daily 1200 internationally units of vitamin-D    Also encouraged weight bearing exercise

## 2021-01-30 ENCOUNTER — TELEPHONE (OUTPATIENT)
Dept: FAMILY MEDICINE CLINIC | Facility: CLINIC | Age: 51
End: 2021-01-30

## 2021-01-30 DIAGNOSIS — E03.9 HYPOTHYROIDISM, UNSPECIFIED TYPE: ICD-10-CM

## 2021-01-30 RX ORDER — LEVOTHYROXINE SODIUM 137 UG/1
TABLET ORAL
Qty: 60 TABLET | Refills: 0 | Status: SHIPPED | OUTPATIENT
Start: 2021-01-30 | End: 2021-03-31 | Stop reason: SDUPTHER

## 2021-02-03 NOTE — TELEPHONE ENCOUNTER
I sent it on 31st January in [facility], does she want a different pharmacy  Please let me know and I will send it again   Thanks

## 2021-03-31 DIAGNOSIS — E03.9 HYPOTHYROIDISM, UNSPECIFIED TYPE: ICD-10-CM

## 2021-03-31 RX ORDER — LEVOTHYROXINE SODIUM 137 UG/1
137 TABLET ORAL DAILY
Qty: 90 TABLET | Refills: 1 | Status: SHIPPED | OUTPATIENT
Start: 2021-03-31 | End: 2021-09-28 | Stop reason: SDUPTHER

## 2021-06-09 ENCOUNTER — TELEPHONE (OUTPATIENT)
Dept: PSYCHIATRY | Facility: CLINIC | Age: 51
End: 2021-06-09

## 2021-06-09 NOTE — TELEPHONE ENCOUNTER
Patient called and left a voice message that she is interested in services I returned her call and left a voice message that she needs a referral from a [facility] provider to get an apt

## 2021-07-16 ENCOUNTER — TELEPHONE (OUTPATIENT)
Dept: OBGYN CLINIC | Facility: CLINIC | Age: 51
End: 2021-07-16

## 2021-07-19 ENCOUNTER — OFFICE VISIT (OUTPATIENT)
Dept: OBGYN CLINIC | Facility: CLINIC | Age: 51
End: 2021-07-19
Payer: COMMERCIAL

## 2021-07-19 VITALS
SYSTOLIC BLOOD PRESSURE: 124 MMHG | BODY MASS INDEX: 25.33 KG/M2 | DIASTOLIC BLOOD PRESSURE: 72 MMHG | HEIGHT: 64 IN | WEIGHT: 148.4 LBS

## 2021-07-19 DIAGNOSIS — R10.2 SUPRAPUBIC PRESSURE: Primary | ICD-10-CM

## 2021-07-19 DIAGNOSIS — N95.2 VAGINAL ATROPHY: ICD-10-CM

## 2021-07-19 DIAGNOSIS — R31.29 MICROSCOPIC HEMATURIA: ICD-10-CM

## 2021-07-19 DIAGNOSIS — R30.0 DYSURIA: ICD-10-CM

## 2021-07-19 LAB
SL AMB  POCT GLUCOSE, UA: ABNORMAL
SL AMB LEUKOCYTE ESTERASE,UA: ABNORMAL
SL AMB POCT BILIRUBIN,UA: ABNORMAL
SL AMB POCT BLOOD,UA: ABNORMAL
SL AMB POCT CLARITY,UA: CLEAR
SL AMB POCT COLOR,UA: ABNORMAL
SL AMB POCT KETONES,UA: ABNORMAL
SL AMB POCT NITRITE,UA: ABNORMAL
SL AMB POCT PH,UA: 6
SL AMB POCT SPECIFIC GRAVITY,UA: 1.02
SL AMB POCT URINE PROTEIN: ABNORMAL
SL AMB POCT UROBILINOGEN: ABNORMAL

## 2021-07-19 PROCEDURE — 87086 URINE CULTURE/COLONY COUNT: CPT | Performed by: PHYSICIAN ASSISTANT

## 2021-07-19 PROCEDURE — 1036F TOBACCO NON-USER: CPT | Performed by: PHYSICIAN ASSISTANT

## 2021-07-19 PROCEDURE — 99214 OFFICE O/P EST MOD 30 MIN: CPT | Performed by: PHYSICIAN ASSISTANT

## 2021-07-19 PROCEDURE — 81003 URINALYSIS AUTO W/O SCOPE: CPT | Performed by: PHYSICIAN ASSISTANT

## 2021-07-19 PROCEDURE — 81002 URINALYSIS NONAUTO W/O SCOPE: CPT | Performed by: PHYSICIAN ASSISTANT

## 2021-07-19 NOTE — PROGRESS NOTES
Assessment/Plan   Diagnoses and all orders for this visit:    Suprapubic pressure  -     Urine culture  -     UA (URINE) with reflex to Scope    Dysuria  -     Urine culture  -     UA (URINE) with reflex to Scope  -     POCT urine dip    Microscopic hematuria  -     Urine culture  -     UA (URINE) with reflex to Scope    Vaginal atrophy    Discussion  Patient does state symptoms have resolved at this time  Reviewed UA - all WNL except (+) microscopic hematuria - will send urine out for UA and culture  Encourage hydration  If urine culture WNL can consider repeat UA or refer to PCP/urology  Encourage coconut oil for vaginal atrophy  Reviewed kegal exercises for vaginal wall prolapse or referral for pelvic floor PT/urogyn if desires/symtpoms bothersome  Patient questions pelvic ultrasound and mammogram - central scheduling phone number provided and aware orders still in Epic from last annual  All questions answered at this time  Patient to call with any further questions/concerns  RTO for APE when due or sooner if needed  Subjective     HPI   Ras Sanon is a 54 y o  female who presents for possible infection  Last week felt body aches, fever/chills, suprapubic pressure and dysuria x a couple days  Seems to feel better at this time  No vulvar itch/burn; No vaginal itch/burn; No abn discharge or odor; No urinary sx - burning/pain/frequency/hematuria  No abd/pelvic pain; No fever/chills currently  LMP - 2/2020; denies  bleeding  Pt is sexually active in a mutually monog/ sexual relationship; Declines std cultures; Declines hiv/hep testing; Feels safe at home  Current contraception: none    Last Pap - 7/18/19 - WNL (-) HRHPV type 16/18 neg (last APE 9/1/20)  History of abnormal Pap smear:     Review of Systems   Constitutional: Negative for activity change, fatigue, fever and unexpected weight change  HENT: Negative for congestion, dental problem, sinus pressure and sinus pain      Eyes: Negative for visual disturbance  Respiratory: Negative for cough, shortness of breath and wheezing  Cardiovascular: Negative for chest pain and leg swelling  Gastrointestinal: Negative for abdominal distention, abdominal pain, blood in stool, constipation, diarrhea, nausea and vomiting  Endocrine: Negative for polydipsia  Genitourinary: Negative for difficulty urinating, dyspareunia, dysuria, frequency, hematuria, menstrual problem, pelvic pain, urgency, vaginal bleeding, vaginal discharge and vaginal pain  Musculoskeletal: Negative for arthralgias and back pain  Allergic/Immunologic: Negative for environmental allergies  Neurological: Negative for dizziness, seizures and headaches  Psychiatric/Behavioral: Negative for dysphoric mood and sleep disturbance  The patient is not nervous/anxious          The following portions of the patient's history were reviewed and updated as appropriate: allergies, current medications, past family history, past medical history, past social history, past surgical history and problem list          Past Medical History:   Diagnosis Date    Allergic     Anemia     iron def    Disease of thyroid gland     Dyspareunia in female     Fatigue     Hemorrhoids     Hypothyroidism     Rectal bleeding     Varicose veins of both legs with edema        Past Surgical History:   Procedure Laterality Date    COLONOSCOPY  2008    COLONOSCOPY  11/15/2019    10 YEAR RECOMMENDED       Family History   Problem Relation Age of Onset    Hypertension Mother     Asthma Father     No Known Problems Sister     No Known Problems Daughter     No Known Problems Maternal Grandmother     No Known Problems Maternal Grandfather     No Known Problems Paternal Grandmother     No Known Problems Paternal Grandfather     No Known Problems Son     No Known Problems Brother        Social History     Socioeconomic History    Marital status: /Civil Union     Spouse name: Not on file    Number of children: Not on file    Years of education: Not on file    Highest education level: Not on file   Occupational History    Not on file   Tobacco Use    Smoking status: Never Smoker    Smokeless tobacco: Never Used   Vaping Use    Vaping Use: Never used   Substance and Sexual Activity    Alcohol use: No    Drug use: No    Sexual activity: Yes     Partners: Male     Birth control/protection: Condom Male   Other Topics Concern    Not on file   Social History Narrative    Not on file     Social Determinants of Health     Financial Resource Strain: Low Risk     Difficulty of Paying Living Expenses: Not hard at all   Food Insecurity: No Food Insecurity    Worried About [address] in the Last Year: Never true    [facility] [address] in the Last Year: Never true   Transportation Needs: No Transportation Needs    Lack of Transportation (Medical): No    Lack of Transportation (Non-Medical):  No   Physical Activity:     Days of Exercise per Week:     Minutes of Exercise per Session:    Stress:     Feeling of Stress :    Social Connections:     Frequency of Communication with Friends and Family:     Frequency of Social Gatherings with Friends and Family:     Attends [employer] Services:     Active Member of Clubs or Organizations:     Attends Club or Organization Meetings:     Marital Status:    Intimate Partner Violence:     Fear of Current or Ex-Partner:     Emotionally Abused:     Physically Abused:     Sexually Abused:          Current Outpatient Medications:     levothyroxine (Euthyrox) 137 mcg tablet, Take 1 tablet (137 mcg total) by mouth daily, Disp: 90 tablet, Rfl: 1    sodium chloride (OCEAN) 0 65 % nasal spray, 1 spray into each nostril as needed for congestion or rhinitis, Disp: 30 mL, Rfl: 1    clobetasol (TEMOVATE) 0 05 % cream, Apply topically 2 (two) times a day, Disp: 30 g, Rfl: 0    docusate sodium (COLACE) 100 mg capsule, Take 1 capsule by mouth 2 (two) times a day as needed, Disp: , Rfl:     hydrocortisone 2 5 % cream, Apply topically 2 (two) times a day, Disp: 30 g, Rfl: 0    loratadine (CLARITIN) 10 mg tablet, Take 1 tablet (10 mg total) by mouth daily, Disp: 90 tablet, Rfl: 1    Multiple Vitamin (MULTIVITAMIN) capsule, Take 1 capsule by mouth daily, Disp: 90 capsule, Rfl: 3    nystatin (MYCOSTATIN) cream, Apply topically 2 (two) times a day, Disp: 30 g, Rfl: 0    polyethylene glycol (MIRALAX) powder, Take 17 g by mouth daily, Disp: , Rfl:     psyllium (METAMUCIL SMOOTH TEXTURE) 28 % packet, Take 1 packet by mouth 2 (two) times a day, Disp: 30 tablet, Rfl: 2    Allergies   Allergen Reactions    Amoxicillin     Kiwi Extract - Food Allergy     Sulfa Antibiotics        Objective   Vitals:    07/19/21 1037   BP: 124/72   BP Location: Left arm   Patient Position: Sitting   Cuff Size: Standard   Weight: 67 3 kg (148 lb 6 4 oz)   Height: 5' 4" (1 626 m)     Physical Exam  Vitals reviewed  Constitutional:       General: She is awake  She is not in acute distress  Appearance: Normal appearance  She is well-developed and well-groomed  She is not diaphoretic  Eyes:      Conjunctiva/sclera: Conjunctivae normal    Abdominal:      General: There is no distension  Palpations: Abdomen is soft  There is no hepatomegaly, splenomegaly or mass  Tenderness: There is no abdominal tenderness  Hernia: No hernia is present  There is no hernia in the left inguinal area or right inguinal area  Genitourinary:     General: Normal vulva  Exam position: Supine  Pubic Area: No rash or pubic lice  Labia:         Right: No rash, tenderness, lesion or injury  Left: No rash, tenderness, lesion or injury  Urethra: No prolapse, urethral pain, urethral swelling or urethral lesion  Vagina: No signs of injury and foreign body  Prolapsed vaginal walls (mild posterior vaginal wall prolapse noted on introitus) present   No vaginal discharge, erythema, tenderness, bleeding or lesions  Cervix: No cervical motion tenderness, discharge, friability, lesion, erythema or cervical bleeding  Uterus: Not deviated, not enlarged, not fixed, not tender and no uterine prolapse  Adnexa:         Right: No mass, tenderness or fullness  Left: No mass, tenderness or fullness  Comments: Mild/moderate vaginal atrophy noted  Lymphadenopathy:      Lower Body: No right inguinal adenopathy  No left inguinal adenopathy  Skin:     General: Skin is warm and dry  Neurological:      Mental Status: She is alert and oriented to person, place, and time  Psychiatric:         Mood and Affect: Mood and affect normal          Speech: Speech normal          Behavior: Behavior normal  Behavior is cooperative  Thought Content:  Thought content normal          Judgment: Judgment normal

## 2021-07-20 ENCOUNTER — LAB (OUTPATIENT)
Dept: LAB | Facility: CLINIC | Age: 51
End: 2021-07-20
Payer: COMMERCIAL

## 2021-07-20 DIAGNOSIS — E55.9 VITAMIN D DEFICIENCY: ICD-10-CM

## 2021-07-20 DIAGNOSIS — E03.9 HYPOTHYROIDISM, UNSPECIFIED TYPE: ICD-10-CM

## 2021-07-20 LAB
25(OH)D3 SERPL-MCNC: 24.9 NG/ML (ref 30–100)
BACTERIA UR CULT: NORMAL
BILIRUB UR QL STRIP: NEGATIVE
CLARITY UR: CLEAR
COLOR UR: YELLOW
GLUCOSE UR STRIP-MCNC: NEGATIVE MG/DL
HGB UR QL STRIP.AUTO: NEGATIVE
KETONES UR STRIP-MCNC: NEGATIVE MG/DL
LEUKOCYTE ESTERASE UR QL STRIP: NEGATIVE
NITRITE UR QL STRIP: NEGATIVE
PH UR STRIP.AUTO: 6 [PH]
PROT UR STRIP-MCNC: NEGATIVE MG/DL
SP GR UR STRIP.AUTO: 1.01 (ref 1–1.03)
TSH SERPL DL<=0.05 MIU/L-ACNC: 1.62 UIU/ML (ref 0.36–3.74)
UROBILINOGEN UR QL STRIP.AUTO: 0.2 E.U./DL

## 2021-07-20 PROCEDURE — 84443 ASSAY THYROID STIM HORMONE: CPT

## 2021-07-20 PROCEDURE — 82306 VITAMIN D 25 HYDROXY: CPT

## 2021-07-20 PROCEDURE — 36415 COLL VENOUS BLD VENIPUNCTURE: CPT

## 2021-07-29 ENCOUNTER — OFFICE VISIT (OUTPATIENT)
Dept: FAMILY MEDICINE CLINIC | Facility: CLINIC | Age: 51
End: 2021-07-29

## 2021-07-29 ENCOUNTER — PATIENT OUTREACH (OUTPATIENT)
Dept: FAMILY MEDICINE CLINIC | Facility: CLINIC | Age: 51
End: 2021-07-29

## 2021-07-29 VITALS
TEMPERATURE: 98.3 F | BODY MASS INDEX: 25.06 KG/M2 | HEIGHT: 64 IN | RESPIRATION RATE: 18 BRPM | OXYGEN SATURATION: 99 % | WEIGHT: 146.8 LBS | DIASTOLIC BLOOD PRESSURE: 70 MMHG | HEART RATE: 77 BPM | SYSTOLIC BLOOD PRESSURE: 120 MMHG

## 2021-07-29 DIAGNOSIS — L23.5 ALLERGIC DERMATITIS DUE TO OTHER CHEMICAL PRODUCT: ICD-10-CM

## 2021-07-29 DIAGNOSIS — E55.9 VITAMIN D DEFICIENCY: Primary | ICD-10-CM

## 2021-07-29 DIAGNOSIS — E03.9 HYPOTHYROIDISM, UNSPECIFIED TYPE: ICD-10-CM

## 2021-07-29 DIAGNOSIS — R41.3 MEMORY CHANGE: ICD-10-CM

## 2021-07-29 DIAGNOSIS — F32.1 CURRENT MODERATE EPISODE OF MAJOR DEPRESSIVE DISORDER WITHOUT PRIOR EPISODE (HCC): ICD-10-CM

## 2021-07-29 PROCEDURE — 3008F BODY MASS INDEX DOCD: CPT | Performed by: PHYSICIAN ASSISTANT

## 2021-07-29 PROCEDURE — T1015 CLINIC SERVICE: HCPCS | Performed by: FAMILY MEDICINE

## 2021-07-29 PROCEDURE — 99214 OFFICE O/P EST MOD 30 MIN: CPT | Performed by: FAMILY MEDICINE

## 2021-07-29 RX ORDER — ESCITALOPRAM OXALATE 5 MG/1
5 TABLET ORAL DAILY
Qty: 30 TABLET | Refills: 1 | Status: SHIPPED | OUTPATIENT
Start: 2021-07-29

## 2021-07-29 RX ORDER — CLOBETASOL PROPIONATE 0.5 MG/G
CREAM TOPICAL 2 TIMES DAILY
Qty: 30 G | Refills: 0 | Status: SHIPPED | OUTPATIENT
Start: 2021-07-29 | End: 2022-06-06 | Stop reason: SDUPTHER

## 2021-07-29 RX ORDER — MELATONIN
1000 DAILY
Qty: 90 TABLET | Refills: 1 | Status: SHIPPED | OUTPATIENT
Start: 2021-07-29

## 2021-07-29 NOTE — PROGRESS NOTES
Assessment/Plan:    No problem-specific Assessment & Plan notes found for this encounter  {Assess/PlanSmartLinks:00665}      Subjective:      Patient ID: Fidencio Vargas is a 54 y o  female  Not taking vit d daily - only taking once a week      L leg pain, 2 weeks, minimal help with tylenol , hurts when walking  No trauma   imrpvonmg    Suprapubic pain normal       {Common ambulatory SmartLinks:06573}    Review of Systems      Objective:      /70 (BP Location: Left arm, Patient Position: Sitting, Cuff Size: Standard)   Pulse 77   Temp 98 3 °F (36 8 °C) (Temporal)   Resp 18   Ht 5' 4" (1 626 m)   Wt 66 6 kg (146 lb 12 8 oz)   SpO2 99%   BMI 25 20 kg/m²          Physical Exam

## 2021-07-29 NOTE — ASSESSMENT & PLAN NOTE
Patient with significant depression,  Denies suicidal or homicidal ideations  Discussed psychotherapy and pharmacotherapy  Patient agreeable to try Lexapro, will start Lexapro 5 mg and have patient follow-up in 1 month    Will also send message to Damian Hare to call patient's regarding different options available for therapy

## 2021-07-29 NOTE — ASSESSMENT & PLAN NOTE
Continues to have rash, in spite of clobetasol  Advised to continue clobetasol  Referral for Dermatology given today

## 2021-07-29 NOTE — PROGRESS NOTES
BMI Counseling: Body mass index is 25 2 kg/m²  The BMI is above normal  Nutrition recommendations include reducing portion sizes and decreasing overall calorie intake  Exercise recommendations include moderate aerobic physical activity for 150 minutes/week  Assessment/Plan:    Hypothyroidism    TSH 1 622, patient is currently on levothyroxine 137 mcg  Will continue at current dose    Allergic contact dermatitis   Continues to have rash, in spite of clobetasol  Advised to continue clobetasol  Referral for Dermatology given today  Vitamin D deficiency    Vitamin-D levels 24 9  Advised to start vitamin-D 1000 International Units daily for 3 months  Memory change    TSH and B12 levels normal   Will do formal memory evaluation in the next visit  Suspect it is secondary to depression    Current moderate episode of major depressive disorder without prior episode [facility])   Patient with significant depression,  Denies suicidal or homicidal ideations  Discussed psychotherapy and pharmacotherapy  Patient agreeable to try Lexapro, will start Lexapro 5 mg and have patient follow-up in 1 month  Will also send message to [place] to call patient's regarding different options available for therapy       Diagnoses and all orders for this visit:    Vitamin D deficiency  -     cholecalciferol (VITAMIN D3) 1,000 units tablet; Take 1 tablet (1,000 Units total) by mouth daily    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 chemical product  -     cholecalciferol (VITAMIN D3) 1,000 units tablet; Take 1 tablet (1,000 Units total) by mouth daily  -     clobetasol (TEMOVATE) 0 05 % cream; Apply topically 2 (two) times a day  -     Ambulatory referral to Dermatology; Future    Current moderate episode of major depressive disorder without prior episode (HCC)  -     escitalopram (LEXAPRO) 5 mg tablet;  Take 1 tablet (5 mg total) by mouth daily  -     Ambulatory referral to social work care management program; Future    Hypothyroidism, unspecified type    Memory change          Subjective:      Patient ID: Jamari Ventura is a 54 y o  female  61-year-old Jagdip presented to office for follow-up of labs  Reports she is not taking vitamin-D currently, and would like prescription sent  Today she is complaining of pain in her legs, fatigue, memory issues  She reports she was in considerable stress sometime ago because of her son, but things are getting better now  She reports her depression is slightly better  The following portions of the patient's history were reviewed and updated as appropriate: allergies, current medications, past family history, past medical history, past social history, past surgical history and problem list       Review of Systems   Constitutional: Negative for activity change, chills and fever  HENT: Negative for congestion  Respiratory: Negative for shortness of breath  Cardiovascular: Negative for chest pain  Gastrointestinal: Negative for diarrhea, nausea and vomiting  Genitourinary: Negative for difficulty urinating  Musculoskeletal: Positive for myalgias  Neurological: Negative for headaches  Psychiatric/Behavioral: Negative for suicidal ideas  Objective:      /70 (BP Location: Left arm, Patient Position: Sitting, Cuff Size: Standard)   Pulse 77   Temp 98 3 °F (36 8 °C) (Temporal)   Resp 18   Ht 5' 4" (1 626 m)   Wt 66 6 kg (146 lb 12 8 oz)   SpO2 99%   BMI 25 20 kg/m²          Physical Exam  Constitutional:       Appearance: She is well-developed  HENT:      Head: Normocephalic and atraumatic  Right Ear: External ear normal       Left Ear: External ear normal    Eyes:      Conjunctiva/sclera: Conjunctivae normal       Pupils: Pupils are equal, round, and reactive to light  Cardiovascular:      Rate and Rhythm: Normal rate and regular rhythm  Heart sounds: Normal heart sounds  No murmur heard  No friction rub     Pulmonary:      Effort: Pulmonary effort is normal  No respiratory distress  Breath sounds: Normal breath sounds  No wheezing or rales  Musculoskeletal:         General: Normal range of motion  Cervical back: Normal range of motion and neck supple  Skin:     General: Skin is warm and dry  Neurological:      Mental Status: She is alert and oriented to person, place, and time

## 2021-07-29 NOTE — ASSESSMENT & PLAN NOTE
TSH and B12 levels normal   Will do formal memory evaluation in the next visit    Suspect it is secondary to depression

## 2021-08-10 ENCOUNTER — PATIENT OUTREACH (OUTPATIENT)
Dept: FAMILY MEDICINE CLINIC | Facility: CLINIC | Age: 51
End: 2021-08-10

## 2021-08-10 NOTE — PROGRESS NOTES
Received return call from patient reporting that she is interested in becoming involved with outpatient mental health treatment  Reviewed local in network outpatient mental health options and contact information provided to patient  Patient reports she is able to call the agency to schedule an appointment and/or visit the agency in person if she is not successful with a phone call  Supportive counseling provided to patient who denies further needs at this time  Will continue to be available to provide support

## 2021-09-01 ENCOUNTER — HOSPITAL ENCOUNTER (OUTPATIENT)
Dept: ULTRASOUND IMAGING | Facility: HOSPITAL | Age: 51
Discharge: HOME | End: 2021-09-01
Attending: PHYSICIAN ASSISTANT
Payer: COMMERCIAL

## 2021-09-01 DIAGNOSIS — R10.2 PELVIC PAIN: ICD-10-CM

## 2021-09-01 PROCEDURE — 76856 US EXAM PELVIC COMPLETE: CPT

## 2021-09-01 PROCEDURE — 76830 TRANSVAGINAL US NON-OB: CPT

## 2021-09-09 ENCOUNTER — OFFICE VISIT (OUTPATIENT)
Dept: FAMILY MEDICINE CLINIC | Facility: CLINIC | Age: 51
End: 2021-09-09

## 2021-09-09 VITALS
TEMPERATURE: 97 F | SYSTOLIC BLOOD PRESSURE: 110 MMHG | DIASTOLIC BLOOD PRESSURE: 70 MMHG | HEIGHT: 60 IN | BODY MASS INDEX: 29.33 KG/M2 | RESPIRATION RATE: 18 BRPM | OXYGEN SATURATION: 98 % | WEIGHT: 149.4 LBS | HEART RATE: 78 BPM

## 2021-09-09 DIAGNOSIS — R53.83 OTHER FATIGUE: ICD-10-CM

## 2021-09-09 DIAGNOSIS — R41.3 MEMORY CHANGE: Primary | ICD-10-CM

## 2021-09-09 DIAGNOSIS — F32.1 CURRENT MODERATE EPISODE OF MAJOR DEPRESSIVE DISORDER WITHOUT PRIOR EPISODE (HCC): ICD-10-CM

## 2021-09-09 DIAGNOSIS — Z13.6 SCREENING FOR CARDIOVASCULAR CONDITION: ICD-10-CM

## 2021-09-09 PROCEDURE — 99214 OFFICE O/P EST MOD 30 MIN: CPT | Performed by: FAMILY MEDICINE

## 2021-09-09 NOTE — ASSESSMENT & PLAN NOTE
COGNITIVE FUNCTION  History: No known cognitive impairment  Registration: 3  Screening: Recall item 3  Clock drawin point for the clock [place]  1 point for all the numbers being in the correct order  1 point for the numbers being in the proper special order  1 point for the two hands of the clock  Verbal Fluency Test: 3        Reassured pt     Suspect memory changes are due to depression, will reassess after patient starts Lexapro

## 2021-09-09 NOTE — ASSESSMENT & PLAN NOTE
Will check CBC, CMP    TSH was done recently and was normal   She has slightly low vitamin-D levels and takes over-the-counter vitamin-D

## 2021-09-09 NOTE — ASSESSMENT & PLAN NOTE
Patient did not start Lexapro that was prescribed in previous visit, willing to start now  Discussed side effects and expectations  Patient also willing to do therapy sessions, will refer to Dr Ishmael Chavis     patient to follow-up in 4 weeks

## 2021-09-09 NOTE — PROGRESS NOTES
Assessment/Plan     Memory change  COGNITIVE FUNCTION  History: No known cognitive impairment  Registration: 3  Screening: Recall item 3  Clock drawin point for the clock [place]  1 point for all the numbers being in the correct order  1 point for the numbers being in the proper special order  1 point for the two hands of the clock  Verbal Fluency Test: 3        Reassured pt  Suspect memory changes are due to depression, will reassess after patient starts Lexapro          Current moderate episode of major depressive disorder without prior episode [facility])   Patient did not start Lexapro that was prescribed in previous visit, willing to start now  Discussed side effects and expectations  Patient also willing to do therapy sessions, will refer to Dr Marylin Meyer  patient to follow-up in 4 weeks    Fatigue   Will check CBC, CMP  TSH was done recently and was normal   She has slightly low vitamin-D levels and takes over-the-counter vitamin-D     Diagnoses and all orders for this visit:    Memory change  -     Vitamin B12; Future    Other fatigue  -     CBC and Platelet; Future    Screening for cardiovascular condition  -     Comprehensive metabolic panel; Future  -     Lipid panel; Future    Current moderate episode of major depressive disorder without prior episode [facility])         Subjective     Chief Complaint   Patient presents with    Depression    Memory Loss        61-year-old presented to office for follow-up of depression  She was recommended starting Lexapro in the last visit, but patient reports that she got COVID vaccine and had flu like symptoms after that and did not feel like continuing Lexapro  She is willing to restart Lexapro for depression now  She is also willing to start therapy sessions with the therapist   She is also concerned about fatigue, and shortness of breath that has developed since her COVID vaccine  She reports it was worse in the beginning, but now is getting better    She also had  Concerns about memory and wants it to be evaluated today  The following portions of the patient's history were reviewed and updated as appropriate: allergies, current medications, past family history, past medical history, past social history, past surgical history and problem list     Review of Systems   Constitutional: Positive for fatigue  Negative for chills and fever  HENT: Negative for congestion  Respiratory: Negative for shortness of breath  Cardiovascular: Negative for chest pain  Gastrointestinal: Negative for diarrhea, nausea and vomiting  Genitourinary: Negative for difficulty urinating  Neurological: Negative for headaches  Psychiatric/Behavioral: Positive for decreased concentration  Objective     Vitals:Blood pressure 110/70, pulse 78, temperature (!) 97 °F (36 1 °C), temperature source Tympanic, resp  rate 18, height 5' (1 524 m), weight 67 8 kg (149 lb 6 4 oz), SpO2 98 %, not currently breastfeeding  Physical Exam:  Physical Exam  Constitutional:       Appearance: She is well-developed  HENT:      Head: Normocephalic and atraumatic  Right Ear: External ear normal       Left Ear: External ear normal    Eyes:      Conjunctiva/sclera: Conjunctivae normal       Pupils: Pupils are equal, round, and reactive to light  Cardiovascular:      Rate and Rhythm: Normal rate and regular rhythm  Heart sounds: Normal heart sounds  No murmur heard  No friction rub  Pulmonary:      Effort: Pulmonary effort is normal  No respiratory distress  Breath sounds: Normal breath sounds  No wheezing or rales  Musculoskeletal:         General: Normal range of motion  Cervical back: Normal range of motion and neck supple  Skin:     General: Skin is warm and dry  Neurological:      Mental Status: She is alert and oriented to person, place, and time

## 2021-09-28 DIAGNOSIS — E03.9 HYPOTHYROIDISM, UNSPECIFIED TYPE: ICD-10-CM

## 2021-09-28 RX ORDER — LEVOTHYROXINE SODIUM 137 UG/1
137 TABLET ORAL DAILY
Qty: 90 TABLET | Refills: 1 | Status: SHIPPED | OUTPATIENT
Start: 2021-09-28 | End: 2022-03-29 | Stop reason: SDUPTHER

## 2021-10-04 ENCOUNTER — OFFICE VISIT (OUTPATIENT)
Dept: FAMILY MEDICINE CLINIC | Facility: CLINIC | Age: 51
End: 2021-10-04

## 2021-10-04 ENCOUNTER — APPOINTMENT (OUTPATIENT)
Dept: LAB | Facility: CLINIC | Age: 51
End: 2021-10-04
Payer: COMMERCIAL

## 2021-10-04 VITALS
WEIGHT: 149.6 LBS | SYSTOLIC BLOOD PRESSURE: 142 MMHG | BODY MASS INDEX: 29.37 KG/M2 | RESPIRATION RATE: 16 BRPM | HEART RATE: 76 BPM | HEIGHT: 60 IN | DIASTOLIC BLOOD PRESSURE: 78 MMHG | OXYGEN SATURATION: 100 % | TEMPERATURE: 98.1 F

## 2021-10-04 DIAGNOSIS — Z13.6 SCREENING FOR CARDIOVASCULAR CONDITION: ICD-10-CM

## 2021-10-04 DIAGNOSIS — F32.1 CURRENT MODERATE EPISODE OF MAJOR DEPRESSIVE DISORDER WITHOUT PRIOR EPISODE (HCC): Primary | ICD-10-CM

## 2021-10-04 DIAGNOSIS — R41.3 MEMORY CHANGE: ICD-10-CM

## 2021-10-04 DIAGNOSIS — R53.83 OTHER FATIGUE: ICD-10-CM

## 2021-10-04 LAB
ALBUMIN SERPL BCP-MCNC: 3.4 G/DL (ref 3.5–5)
ALP SERPL-CCNC: 83 U/L (ref 46–116)
ALT SERPL W P-5'-P-CCNC: 20 U/L (ref 12–78)
ANION GAP SERPL CALCULATED.3IONS-SCNC: 8 MMOL/L (ref 4–13)
AST SERPL W P-5'-P-CCNC: 17 U/L (ref 5–45)
BILIRUB SERPL-MCNC: 0.24 MG/DL (ref 0.2–1)
BUN SERPL-MCNC: 11 MG/DL (ref 5–25)
CALCIUM ALBUM COR SERPL-MCNC: 9.4 MG/DL (ref 8.3–10.1)
CALCIUM SERPL-MCNC: 8.9 MG/DL (ref 8.3–10.1)
CHLORIDE SERPL-SCNC: 105 MMOL/L (ref 100–108)
CHOLEST SERPL-MCNC: 215 MG/DL (ref 50–200)
CO2 SERPL-SCNC: 29 MMOL/L (ref 21–32)
CREAT SERPL-MCNC: 0.89 MG/DL (ref 0.6–1.3)
ERYTHROCYTE [DISTWIDTH] IN BLOOD BY AUTOMATED COUNT: 16.2 % (ref 11.6–15.1)
GFR SERPL CREATININE-BSD FRML MDRD: 76 ML/MIN/1.73SQ M
GLUCOSE P FAST SERPL-MCNC: 89 MG/DL (ref 65–99)
HCT VFR BLD AUTO: 35.6 % (ref 34.8–46.1)
HDLC SERPL-MCNC: 58 MG/DL
HGB BLD-MCNC: 10.7 G/DL (ref 11.5–15.4)
LDLC SERPL CALC-MCNC: 132 MG/DL (ref 0–100)
MCH RBC QN AUTO: 24.7 PG (ref 26.8–34.3)
MCHC RBC AUTO-ENTMCNC: 30.1 G/DL (ref 31.4–37.4)
MCV RBC AUTO: 82 FL (ref 82–98)
NONHDLC SERPL-MCNC: 157 MG/DL
PLATELET # BLD AUTO: 342 THOUSANDS/UL (ref 149–390)
PMV BLD AUTO: 9.5 FL (ref 8.9–12.7)
POTASSIUM SERPL-SCNC: 3.9 MMOL/L (ref 3.5–5.3)
PROT SERPL-MCNC: 8.4 G/DL (ref 6.4–8.2)
RBC # BLD AUTO: 4.33 MILLION/UL (ref 3.81–5.12)
SODIUM SERPL-SCNC: 142 MMOL/L (ref 136–145)
TRIGL SERPL-MCNC: 124 MG/DL
VIT B12 SERPL-MCNC: 358 PG/ML (ref 100–900)
WBC # BLD AUTO: 6.17 THOUSAND/UL (ref 4.31–10.16)

## 2021-10-04 PROCEDURE — 99214 OFFICE O/P EST MOD 30 MIN: CPT | Performed by: FAMILY MEDICINE

## 2021-10-04 PROCEDURE — 80061 LIPID PANEL: CPT

## 2021-10-04 PROCEDURE — 80053 COMPREHEN METABOLIC PANEL: CPT

## 2021-10-04 PROCEDURE — 82607 VITAMIN B-12: CPT

## 2021-10-04 PROCEDURE — 85027 COMPLETE CBC AUTOMATED: CPT

## 2021-10-04 PROCEDURE — 36415 COLL VENOUS BLD VENIPUNCTURE: CPT

## 2021-10-07 ENCOUNTER — OFFICE VISIT (OUTPATIENT)
Dept: FAMILY MEDICINE CLINIC | Facility: CLINIC | Age: 51
End: 2021-10-07

## 2021-10-07 VITALS
OXYGEN SATURATION: 99 % | HEIGHT: 60 IN | HEART RATE: 81 BPM | WEIGHT: 149.6 LBS | RESPIRATION RATE: 18 BRPM | DIASTOLIC BLOOD PRESSURE: 80 MMHG | BODY MASS INDEX: 29.37 KG/M2 | SYSTOLIC BLOOD PRESSURE: 122 MMHG | TEMPERATURE: 98.9 F

## 2021-10-07 DIAGNOSIS — F32.1 CURRENT MODERATE EPISODE OF MAJOR DEPRESSIVE DISORDER WITHOUT PRIOR EPISODE (HCC): Primary | ICD-10-CM

## 2021-10-07 DIAGNOSIS — K64.2 GRADE III HEMORRHOIDS: ICD-10-CM

## 2021-10-07 DIAGNOSIS — Z23 ENCOUNTER FOR IMMUNIZATION: ICD-10-CM

## 2021-10-07 DIAGNOSIS — D50.9 IRON DEFICIENCY ANEMIA, UNSPECIFIED IRON DEFICIENCY ANEMIA TYPE: ICD-10-CM

## 2021-10-07 PROCEDURE — 90471 IMMUNIZATION ADMIN: CPT | Performed by: FAMILY MEDICINE

## 2021-10-07 PROCEDURE — 99214 OFFICE O/P EST MOD 30 MIN: CPT | Performed by: FAMILY MEDICINE

## 2021-10-07 PROCEDURE — 90686 IIV4 VACC NO PRSV 0.5 ML IM: CPT | Performed by: FAMILY MEDICINE

## 2021-11-24 ENCOUNTER — HOSPITAL ENCOUNTER (OUTPATIENT)
Dept: RADIOLOGY | Age: 51
Discharge: HOME | End: 2021-11-24
Payer: COMMERCIAL

## 2021-11-24 VITALS — HEIGHT: 64 IN | WEIGHT: 149.6 LBS | BODY MASS INDEX: 25.54 KG/M2

## 2021-11-24 DIAGNOSIS — Z12.31 ENCOUNTER FOR SCREENING MAMMOGRAM FOR BREAST CANCER: ICD-10-CM

## 2021-11-24 PROCEDURE — 77067 SCR MAMMO BI INCL CAD: CPT

## 2021-11-24 PROCEDURE — 77063 BREAST TOMOSYNTHESIS BI: CPT

## 2021-12-08 ENCOUNTER — CONSULT (OUTPATIENT)
Dept: DERMATOLOGY | Facility: CLINIC | Age: 51
End: 2021-12-08
Payer: COMMERCIAL

## 2021-12-08 VITALS — HEIGHT: 64 IN | WEIGHT: 146.6 LBS | BODY MASS INDEX: 25.03 KG/M2 | TEMPERATURE: 97.5 F

## 2021-12-08 DIAGNOSIS — L20.9 ATOPIC DERMATITIS, UNSPECIFIED TYPE: Primary | ICD-10-CM

## 2021-12-08 DIAGNOSIS — L23.5 ALLERGIC DERMATITIS DUE TO OTHER CHEMICAL PRODUCT: ICD-10-CM

## 2021-12-08 PROCEDURE — 99244 OFF/OP CNSLTJ NEW/EST MOD 40: CPT | Performed by: DERMATOLOGY

## 2021-12-22 ENCOUNTER — OFFICE VISIT (OUTPATIENT)
Dept: FAMILY MEDICINE CLINIC | Facility: CLINIC | Age: 51
End: 2021-12-22

## 2021-12-22 VITALS
TEMPERATURE: 98.1 F | OXYGEN SATURATION: 100 % | HEIGHT: 64 IN | WEIGHT: 147 LBS | SYSTOLIC BLOOD PRESSURE: 110 MMHG | DIASTOLIC BLOOD PRESSURE: 80 MMHG | BODY MASS INDEX: 25.1 KG/M2 | HEART RATE: 84 BPM

## 2021-12-22 DIAGNOSIS — R53.83 FATIGUE, UNSPECIFIED TYPE: Primary | ICD-10-CM

## 2021-12-22 PROCEDURE — 99214 OFFICE O/P EST MOD 30 MIN: CPT | Performed by: FAMILY MEDICINE

## 2021-12-22 PROCEDURE — 87636 SARSCOV2 & INF A&B AMP PRB: CPT

## 2021-12-24 LAB
FLUAV RNA RESP QL NAA+PROBE: NEGATIVE
FLUBV RNA RESP QL NAA+PROBE: NEGATIVE
SARS-COV-2 RNA RESP QL NAA+PROBE: NEGATIVE

## 2022-01-27 ENCOUNTER — OFFICE VISIT (OUTPATIENT)
Dept: FAMILY MEDICINE CLINIC | Facility: CLINIC | Age: 52
End: 2022-01-27

## 2022-01-27 VITALS
RESPIRATION RATE: 16 BRPM | TEMPERATURE: 98 F | BODY MASS INDEX: 25.4 KG/M2 | SYSTOLIC BLOOD PRESSURE: 105 MMHG | DIASTOLIC BLOOD PRESSURE: 69 MMHG | WEIGHT: 148 LBS | HEART RATE: 84 BPM | OXYGEN SATURATION: 98 %

## 2022-01-27 DIAGNOSIS — H60.391 OTHER INFECTIVE ACUTE OTITIS EXTERNA OF RIGHT EAR: Primary | ICD-10-CM

## 2022-01-27 PROBLEM — H60.90 OTITIS EXTERNA: Status: ACTIVE | Noted: 2022-01-27

## 2022-01-27 PROCEDURE — 99213 OFFICE O/P EST LOW 20 MIN: CPT | Performed by: FAMILY MEDICINE

## 2022-01-27 RX ORDER — CIPROFLOXACIN AND DEXAMETHASONE 3; 1 MG/ML; MG/ML
4 SUSPENSION/ DROPS AURICULAR (OTIC) 2 TIMES DAILY
Qty: 7.5 ML | Refills: 0 | Status: SHIPPED | OUTPATIENT
Start: 2022-01-27

## 2022-01-27 NOTE — PROGRESS NOTES
Assessment/Plan:    Otitis externa  Right ear pain, shooting , intermittent for the last 2-3 days, no improvement; tenderness on exam  -ciprodex 4 drops BID for 5 days prescribed  -f/u if no improvement after the treatment or worsening complains       Diagnoses and all orders for this visit:    Other infective acute otitis externa of right ear  -     ciprofloxacin-dexamethasone (CIPRODEX) otic suspension; Administer 4 drops to the right ear 2 (two) times a day          Subjective:      Patient ID: Tiana Felipe is a 54 y o  female  Patient presented as a same day visit due to complains of the pain in the right ear, started 2-3 days ago, intermittent, no radiation, no trauma reported, no other associated symptoms or complains  The following portions of the patient's history were reviewed and updated as appropriate: allergies, current medications, past family history, past medical history, past social history, past surgical history and problem list     Review of Systems   Constitutional: Negative  HENT: Positive for ear pain  Negative for ear discharge, facial swelling, hearing loss and tinnitus  Eyes: Negative  Respiratory: Negative  Cardiovascular: Negative  Objective:      /69 (BP Location: Left arm, Patient Position: Sitting, Cuff Size: Standard)   Pulse 84   Temp 98 °F (36 7 °C) (Temporal)   Resp 16   Wt 67 1 kg (148 lb)   LMP 08/15/2020 (Approximate)   SpO2 98%   BMI 25 40 kg/m²          Physical Exam  Constitutional:       Appearance: Normal appearance  She is normal weight  HENT:      Right Ear: Hearing and tympanic membrane normal  No decreased hearing noted  Tenderness present  Right ear swelling: mild erythema and swelling of the canal  There is no impacted cerumen  No mastoid tenderness  Left Ear: Hearing, tympanic membrane and ear canal normal  No decreased hearing noted  No swelling or tenderness  There is no impacted cerumen  No mastoid tenderness  Neurological:      Mental Status: She is alert

## 2022-01-27 NOTE — ASSESSMENT & PLAN NOTE
Right ear pain, shooting , intermittent for the last 2-3 days, no improvement; tenderness on exam  -ciprodex 4 drops BID for 5 days prescribed  -f/u if no improvement after the treatment or worsening complains

## 2022-03-29 DIAGNOSIS — E03.9 HYPOTHYROIDISM, UNSPECIFIED TYPE: ICD-10-CM

## 2022-03-30 RX ORDER — LEVOTHYROXINE SODIUM 137 UG/1
137 TABLET ORAL DAILY
Qty: 90 TABLET | Refills: 1 | Status: SHIPPED | OUTPATIENT
Start: 2022-03-30

## 2022-05-23 ENCOUNTER — OFFICE VISIT (OUTPATIENT)
Dept: FAMILY MEDICINE CLINIC | Facility: CLINIC | Age: 52
End: 2022-05-23

## 2022-05-23 VITALS
SYSTOLIC BLOOD PRESSURE: 109 MMHG | HEART RATE: 65 BPM | BODY MASS INDEX: 25.44 KG/M2 | OXYGEN SATURATION: 97 % | WEIGHT: 148.2 LBS | TEMPERATURE: 98.3 F | DIASTOLIC BLOOD PRESSURE: 64 MMHG

## 2022-05-23 DIAGNOSIS — L20.9 ATOPIC DERMATITIS, UNSPECIFIED TYPE: ICD-10-CM

## 2022-05-23 DIAGNOSIS — M72.2 PLANTAR FASCIITIS, BILATERAL: ICD-10-CM

## 2022-05-23 DIAGNOSIS — R68.89 CHANGE IN BLOOD PRESSURE: Primary | ICD-10-CM

## 2022-05-23 PROCEDURE — 99213 OFFICE O/P EST LOW 20 MIN: CPT | Performed by: FAMILY MEDICINE

## 2022-05-23 NOTE — ASSESSMENT & PLAN NOTE
Saw dermatology 12/8/2021 for rash not improving on clobetasol and diagnosed with eczema   Noted at end of visit, pt reports cream/medication not helping  - Pt will call dermatology office for f/u appointment   - also has appt with Dr Juancarlos Villarreal on 6/6 and if unable to get in with dermatology can revisit at this time

## 2022-05-23 NOTE — ASSESSMENT & PLAN NOTE
Pt concerned about BP over weekend elevated compared to normal  Normal SBP 100s-110s, over the weekend elevated to 130s-140s and pt was concerned  Does report feeling anxious over the weekend and now feeling improved  In office /64 back to pt's baseline   Pt does report recently eating many fried and salty foods  - education provided on BP goals and with lower baseline may not tolerate normal-high BP   Return to care/ED precautions reviewed   - likely related to dietary indiscretion and anxiety/nervousness  - recommend avoiding heavy salt loading in diet  - can check BP daily or every other day and keep log if worried  - otherwise BP has returned to baseline for pt and likely does not need follow up unless remains elevated

## 2022-05-23 NOTE — PATIENT INSTRUCTIONS
Plantar Fasciitis Exercises   AMBULATORY CARE:   What you need to know about plantar fasciitis exercises:  Plantar fasciitis exercises help stretch your plantar fascia, calf muscles, and Achilles tendon  They also help strengthen the muscles that support your heel and foot  Exercises and stretching can help prevent plantar fasciitis from getting worse or coming back  Contact your healthcare provider if:   Your pain and swelling increase  You develop new knee, hip, or back pain  You have questions or concerns about your condition or care  How to do plantar fasciitis exercises:  Ask your healthcare provider when to start these exercises and how often to do them  Slant board stretch:  Stand on a slanted board with your toes higher than your heel  Press your heel into the board  Keep your knee slightly bent  Hold this position for 1 minute  Repeat 5 times  Heel stretch:  Stand up straight with your hands on a wall  Place your injured leg slightly behind your other leg  Keep your heels flat on the floor, lean forward, and bend both knees  Hold for 30 seconds  Calf stretch:  Stand up straight with your hands on a wall  Step forward so that your uninjured foot is in front of your injured foot  Keep your front leg bent and your back leg straight  Gently lean forward until you feel your calf stretch  Hold for 30 seconds and then relax  Seated plantar fascia stretch:  Sit on a firm surface, such as the floor or a mat  Extend your legs out in front of you  Raise your injured foot a few inches off the ground  Keep your leg straight  Grab the toes of your injured foot and pull them toward you  With your other hand, feel your plantar fascia  You should feel it press outward  Hold for 30 seconds  If you cannot reach your toes, loop a towel or tie around your foot  Gently pull on the towel or tie and flex your toes toward you  Heel raises:  Stand on the injured leg   Raise your other leg off the ground  Hold onto a railing or wall for balance  Slowly rise up on the toes of your injured leg  Hold for 5 seconds  Slowly lower your heel to the ground  Toe curls:  Place a towel on the floor  Put your foot flat on the towel  Grab the towel with your toes by curling them around the towel  Lift the towel up with your toes  Toe taps:  Sit down and place your foot flat on the floor  Keep your heel on the floor  Point all your toes up toward the ceiling  While the 4 smaller toes are pointed up, bend your big toe down and tap it on the ground  Do 10 to 50 taps  Point all 5 toes up toward the ceiling again  This time keep your big toe pointed up and tap the 4 smaller toes on the ground  Do 10 to 50 taps each time  Follow up with your doctor as directed:  Write down your questions so you remember to ask them during your visits  © Copyright [employer] 2022 Information is for End User's use only and may not be sold, redistributed or otherwise used for commercial purposes  All illustrations and images included in CareNotes® are the copyrighted property of [employer]  or [facility] Lawrence Troncoso   The above information is an  only  It is not intended as medical advice for individual conditions or treatments  Talk to your doctor, nurse or pharmacist before following any medical regimen to see if it is safe and effective for you

## 2022-05-23 NOTE — ASSESSMENT & PLAN NOTE
Pain in b/l plantar aspect of feet and R side above heel, worst in the morning and improving throughout the day, worsens with extended periods of standing/walking  Classic sounds like plantar fasciitis   Pt reports improvement with NSAIDs  - continue supportive care, NSAIDs PRN  - exercises and stretches provided for pt to try daily  - recommend trial foot splint when sleeping  - if worsening and not improving, would further consider imaging, PT and podiatry referral   - pt understands and amenable to plan

## 2022-05-23 NOTE — PROGRESS NOTES
Assessment/Plan:    Change in blood pressure  Pt concerned about BP over weekend elevated compared to normal  Normal SBP 100s-110s, over the weekend elevated to 130s-140s and pt was concerned  Does report feeling anxious over the weekend and now feeling improved  In office /64 back to pt's baseline   Pt does report recently eating many fried and salty foods  - education provided on BP goals and with lower baseline may not tolerate normal-high BP  Return to care/ED precautions reviewed   - likely related to dietary indiscretion and anxiety/nervousness  - recommend avoiding heavy salt loading in diet  - can check BP daily or every other day and keep log if worried  - otherwise BP has returned to baseline for pt and likely does not need follow up unless remains elevated     Atopic dermatitis  Saw dermatology 12/8/2021 for rash not improving on clobetasol and diagnosed with eczema  Noted at end of visit, pt reports cream/medication not helping  - Pt will call dermatology office for f/u appointment   - also has appt with Dr Wade Desai on 6/6 and if unable to get in with dermatology can revisit at this time     Plantar fasciitis, bilateral  Pain in b/l plantar aspect of feet and R side above heel, worst in the morning and improving throughout the day, worsens with extended periods of standing/walking  Classic sounds like plantar fasciitis  Pt reports improvement with NSAIDs  - continue supportive care, NSAIDs PRN  - exercises and stretches provided for pt to try daily  - recommend trial foot splint when sleeping  - if worsening and not improving, would further consider imaging, PT and podiatry referral   - pt understands and amenable to plan        Diagnoses and all orders for this visit:    Change in blood pressure    Plantar fasciitis, bilateral          Subjective:      Patient ID: Aurelio Leroy is a 50 y o  female  HPI  51 yo female presenting for BP concern       Pt usually runs on low side for BP but on Saturday was in 135- 140s and concerned  Pt reports feeling very nervous and anxious that day, so she checked her BP  When she woke up it was   Rest all of Sunday, drank a lot of water, and then this morning pt still felt nervous but today's blood pressures have been low  In office back to normal and pt reports feeling much better and not anxious  PT does report being away recently and eating many fried and salty foods  Pt also concerned by pain in bilateral feet worst in the morning  States she has pain in the bottoms of her feet and R side right above ankle bad in the morning and improving throughout the day  States it hurts when she stands on her feet for prolonged periods too  Has tried alleve which does help the pain  At end of visit pt mentions ongoing rash on hands that she saw dermatology for and diagnosed with eczema  States she does not think it is eczema and the cream is not helping  The following portions of the patient's history were reviewed and updated as appropriate: allergies, current medications, past family history, past medical history, past social history, past surgical history and problem list     Review of Systems   Constitutional: Negative for chills and fever  HENT: Negative for congestion and rhinorrhea  Respiratory: Negative for cough and shortness of breath  Cardiovascular: Negative for chest pain and leg swelling  Gastrointestinal: Negative for abdominal pain, diarrhea, nausea and vomiting  Musculoskeletal:        Bilateral foot pain   Skin: Positive for rash  Neurological: Negative for dizziness, light-headedness and headaches  Psychiatric/Behavioral: Negative for agitation and confusion  The patient is nervous/anxious            Objective:    /64 (BP Location: Left arm, Patient Position: Sitting, Cuff Size: Standard)   Pulse 65   Temp 98 3 °F (36 8 °C) (Temporal)   Wt 67 2 kg (148 lb 3 2 oz)   LMP 08/15/2020 (Approximate)   SpO2 97%   BMI 25 44 kg/m² Physical Exam  Vitals reviewed  Constitutional:       General: She is not in acute distress  Appearance: Normal appearance  She is well-developed  She is not ill-appearing  HENT:      Head: Normocephalic and atraumatic  Right Ear: External ear normal       Left Ear: External ear normal    Eyes:      Extraocular Movements: Extraocular movements intact  Conjunctiva/sclera: Conjunctivae normal    Cardiovascular:      Rate and Rhythm: Normal rate and regular rhythm  Heart sounds: Normal heart sounds  No murmur heard  Pulmonary:      Effort: Pulmonary effort is normal  No respiratory distress  Breath sounds: Normal breath sounds  No wheezing or rales  Abdominal:      General: Bowel sounds are normal       Palpations: Abdomen is soft  Tenderness: There is no abdominal tenderness  Musculoskeletal:         General: No tenderness or deformity  Normal range of motion  Cervical back: Normal range of motion and neck supple  Skin:     General: Skin is warm and dry  Findings: Rash present  Neurological:      General: No focal deficit present  Mental Status: She is alert  Mental status is at baseline     Psychiatric:         Mood and Affect: Mood normal          Behavior: Behavior normal              Kaylin Cooper MD, PGY2  [facility]  Date: 5/23/2022 Time: 1:44 PM

## 2022-06-06 ENCOUNTER — OFFICE VISIT (OUTPATIENT)
Dept: FAMILY MEDICINE CLINIC | Facility: CLINIC | Age: 52
End: 2022-06-06

## 2022-06-06 VITALS
BODY MASS INDEX: 25.33 KG/M2 | HEART RATE: 68 BPM | SYSTOLIC BLOOD PRESSURE: 136 MMHG | RESPIRATION RATE: 18 BRPM | TEMPERATURE: 97.4 F | OXYGEN SATURATION: 98 % | WEIGHT: 148.4 LBS | DIASTOLIC BLOOD PRESSURE: 80 MMHG | HEIGHT: 64 IN

## 2022-06-06 DIAGNOSIS — Z11.4 SCREENING FOR HIV (HUMAN IMMUNODEFICIENCY VIRUS): ICD-10-CM

## 2022-06-06 DIAGNOSIS — F32.1 CURRENT MODERATE EPISODE OF MAJOR DEPRESSIVE DISORDER WITHOUT PRIOR EPISODE (HCC): ICD-10-CM

## 2022-06-06 DIAGNOSIS — Z00.00 ANNUAL PHYSICAL EXAM: Primary | ICD-10-CM

## 2022-06-06 DIAGNOSIS — M72.2 PLANTAR FASCIITIS, BILATERAL: ICD-10-CM

## 2022-06-06 DIAGNOSIS — E03.9 HYPOTHYROIDISM, UNSPECIFIED TYPE: ICD-10-CM

## 2022-06-06 DIAGNOSIS — E55.9 VITAMIN D DEFICIENCY: ICD-10-CM

## 2022-06-06 DIAGNOSIS — L30.9 ECZEMA, UNSPECIFIED TYPE: ICD-10-CM

## 2022-06-06 DIAGNOSIS — Z13.6 SCREENING FOR CARDIOVASCULAR CONDITION: ICD-10-CM

## 2022-06-06 DIAGNOSIS — L23.5 ALLERGIC DERMATITIS DUE TO OTHER CHEMICAL PRODUCT: ICD-10-CM

## 2022-06-06 DIAGNOSIS — D50.9 IRON DEFICIENCY ANEMIA, UNSPECIFIED IRON DEFICIENCY ANEMIA TYPE: ICD-10-CM

## 2022-06-06 PROCEDURE — 99396 PREV VISIT EST AGE 40-64: CPT | Performed by: FAMILY MEDICINE

## 2022-06-06 RX ORDER — CLOBETASOL PROPIONATE 0.5 MG/G
CREAM TOPICAL 2 TIMES DAILY
Qty: 30 G | Refills: 0 | Status: SHIPPED | OUTPATIENT
Start: 2022-06-06

## 2022-06-06 NOTE — ASSESSMENT & PLAN NOTE
Stable with Lexapro 5 milligrams daily  Patient is stressed about situation with son  Recommended coping methods  Will follow-up in next visit    Depression Screening Follow-up Plan: Patient's depression screening was positive with a PHQ-2 score of   Their PHQ-9 score was 6  Patient assessed for underlying major depression  They have no active suicidal ideations  Brief counseling provided and recommend additional follow-up/re-evaluation next office visit

## 2022-06-06 NOTE — ASSESSMENT & PLAN NOTE
Continue Kenalog cream daily, may use clobetasol if the rash worsens, recommended use of thick emollients on skin

## 2022-06-06 NOTE — PATIENT INSTRUCTIONS

## 2022-06-06 NOTE — ASSESSMENT & PLAN NOTE
Recommended arch supports, avoid walking barefooted, will follow-up with next visit if continues to have pain will consider referral to Podiatry

## 2022-06-06 NOTE — PROGRESS NOTES
[address] [facility]    NAME: Lashawn Aden  AGE: 50 y o  SEX: female  : 1970     DATE: 2022     Assessment and Plan:     Problem List Items Addressed This Visit        Endocrine    Hypothyroidism     Currently on  137 micrograms of levothyroxine daily, will repeat TSH           Relevant Orders    TSH, 3rd generation       Musculoskeletal and Integument    Eczema     Continue Kenalog cream daily, may use clobetasol if the rash worsens, recommended use of thick emollients on skin           Relevant Medications    clobetasol (TEMOVATE) 0 05 % cream    Plantar fasciitis, bilateral     Recommended arch supports, avoid walking barefooted, will follow-up with next visit if continues to have pain will consider referral to Podiatry              Other    Iron deficiency anemia     Currently on ferrous sulfate, will recheck CBC           Relevant Orders    CBC and differential    Screening for cardiovascular condition    Relevant Orders    Comprehensive metabolic panel    Lipid panel    Vitamin D deficiency    Relevant Orders    Vitamin D 25 hydroxy    Current moderate episode of major depressive disorder without prior episode (HCC)     Stable with Lexapro 5 milligrams daily  Patient is stressed about situation with son  Recommended coping methods  Will follow-up in next visit    Depression Screening Follow-up Plan: Patient's depression screening was positive with a PHQ-2 score of   Their PHQ-9 score was 6  Patient assessed for underlying major depression  They have no active suicidal ideations  Brief counseling provided and recommend additional follow-up/re-evaluation next office visit               Other Visit Diagnoses     Annual physical exam    -  Primary    Screening for HIV (human immunodeficiency virus)        Relevant Orders    HIV 1/2 Antigen/Antibody (4th Generation) w Reflex [facility]          Immunizations and preventive care screenings were discussed with patient today  Appropriate education was printed on patient's after visit summary  Counseling:  · Exercise: the importance of regular exercise/physical activity was discussed  Recommend exercise 3-5 times per week for at least 30 minutes  BMI Counseling: Body mass index is 25 47 kg/m²  The BMI is above normal  Nutrition recommendations include decreasing portion sizes and encouraging healthy choices of fruits and vegetables  Exercise recommendations include moderate physical activity 150 minutes/week  Rationale for BMI follow-up plan is due to patient being overweight or obese  Return in about 3 months (around 2022)  Chief Complaint:     No chief complaint on file  History of Present Illness:     Adult Annual Physical   Patient here for a comprehensive physical exam  The patient reports problems - foot pain, stress  Patient has pain in plantar aspect of feet in right-side above he will when she wears tight shoes  Worse in the morning, improves with walking  She also wants to talk about her son, who is out of school, not working currently  Patient is having hard time dealing with him and thinks is major contributor to her stress she is tolerating Lexapro well and it seems to be helping  She denies any suicidal ideations  Diet and Physical Activity  · Diet/Nutrition: limited junk food  · Exercise: walking        Depression Screening  PHQ-2/9 Depression Screening    Little interest or pleasure in doing things: 0 - not at all  Feeling down, depressed, or hopeless: 1 - several days  Trouble falling or staying asleep, or sleeping too much: 1 - several days  Feeling tired or having little energy: 2 - more than half the days  Poor appetite or overeatin - not at all  Feeling bad about yourself - or that you are a failure or have let yourself or your family down: 0 - not at all  Trouble concentrating on things, such as reading the newspaper or watching television: 0 - not at all  Moving or speaking so slowly that other people could have noticed  Or the opposite - being so fidgety or restless that you have been moving around a lot more than usual: 2 - more than half the days  Thoughts that you would be better off dead, or of hurting yourself in some way: 0 - not at all  PHQ-9 Score: 6   PHQ-9 Interpretation: Mild depression        General Health  · Sleep: sleeps well  · Hearing: normal - bilateral   · Vision: no vision problems  · Dental: regular dental visits  /GYN Health  · Patient is: postmenopausal sine 2 years   ·      Review of Systems:     Review of Systems   Constitutional: Negative for activity change, chills and fever  HENT: Negative for congestion  Respiratory: Negative for shortness of breath  Cardiovascular: Negative for chest pain  Gastrointestinal: Negative for diarrhea, nausea and vomiting  Genitourinary: Negative for difficulty urinating  Musculoskeletal: Positive for arthralgias  Skin: Positive for rash  Neurological: Negative for headaches  Psychiatric/Behavioral: The patient is nervous/anxious         Past Medical History:     Past Medical History:   Diagnosis Date    Allergic     Anemia     iron def    Disease of thyroid gland     Dyspareunia in female     Fatigue     Hemorrhoids     Hypothyroidism     Rectal bleeding     Varicose veins of both legs with edema       Past Surgical History:     Past Surgical History:   Procedure Laterality Date    COLONOSCOPY  2008    COLONOSCOPY  11/15/2019    10 YEAR RECOMMENDED      Social History:     Social History     Socioeconomic History    Marital status: /Civil Union     Spouse name: None    Number of children: None    Years of education: None    Highest education level: None   Occupational History    None   Tobacco Use    Smoking status: Never Smoker    Smokeless tobacco: Never Used   Vaping Use    Vaping Use: Never used   Substance and Sexual Activity    Alcohol use: No    Drug use: No    Sexual activity: Yes     Partners: Male     Birth control/protection: Condom Male   Other Topics Concern    None   Social History Narrative    None     Social Determinants of Health     Financial Resource Strain: Medium Risk    Difficulty of Paying Living Expenses: Somewhat hard   Food Insecurity: No Food Insecurity    Worried About Running Out of Food in the Last Year: Never true    Manish of Food in the Last Year: Never true   Transportation Needs: No Transportation Needs    Lack of Transportation (Medical): No    Lack of Transportation (Non-Medical): No   Physical Activity: Not on file   Stress: Stress Concern Present    Feeling of Stress :  To some extent   Social Connections: Not on file   Intimate Partner Violence: Not on file   Housing Stability: Unknown    Unable to Pay for Housing in the Last Year: No    Number of Places Lived in the Last Year: Not on file    Unstable Housing in the Last Year: No      Family History:     Family History   Problem Relation Age of Onset    Hypertension Mother     Asthma Father     No Known Problems Sister     No Known Problems Daughter     No Known Problems Maternal Grandmother     No Known Problems Maternal Grandfather     No Known Problems Paternal Grandmother     No Known Problems Paternal Grandfather     No Known Problems Son     No Known Problems Brother       Current Medications:     Current Outpatient Medications   Medication Sig Dispense Refill    cholecalciferol (VITAMIN D3) 1,000 units tablet Take 1 tablet (1,000 Units total) by mouth daily 90 tablet 1    clobetasol (TEMOVATE) 0 05 % cream Apply topically 2 (two) times a day 30 g 0    docusate sodium (COLACE) 100 mg capsule Take 1 capsule by mouth 2 (two) times a day as needed      escitalopram (LEXAPRO) 5 mg tablet Take 1 tablet (5 mg total) by mouth daily 30 tablet 1    hydrocortisone 2 5 % cream Apply topically 2 (two) times a day 30 g 0    levothyroxine (Euthyrox) 137 mcg tablet Take 1 tablet (137 mcg total) by mouth daily 90 tablet 1    loratadine (CLARITIN) 10 mg tablet Take 1 tablet (10 mg total) by mouth daily (Patient taking differently: Take 10 mg by mouth daily PRN) 90 tablet 1    Multiple Vitamin (MULTIVITAMIN) capsule Take 1 capsule by mouth daily (Patient taking differently: Take 1 capsule by mouth daily PRN) 90 capsule 3    nystatin (MYCOSTATIN) cream Apply topically 2 (two) times a day 30 g 0    polyethylene glycol (GLYCOLAX) 17 GM/SCOOP powder Take 17 g by mouth daily PRN      psyllium (METAMUCIL SMOOTH TEXTURE) 28 % packet Take 1 packet by mouth 2 (two) times a day 30 tablet 2    sodium chloride (OCEAN) 0 65 % nasal spray 1 spray into each nostril as needed for congestion or rhinitis (Patient taking differently: 1 spray into each nostril as needed for congestion or rhinitis PRN) 30 mL 1    triamcinolone (KENALOG) 0 1 % ointment Apply topically 2 (two) times a day 80 g 3    ciprofloxacin-dexamethasone (CIPRODEX) otic suspension Administer 4 drops to the right ear 2 (two) times a day (Patient not taking: No sig reported) 7 5 mL 0     No current facility-administered medications for this visit  Allergies: Allergies   Allergen Reactions    Amoxicillin     Kiwi Extract - Food Allergy     Sulfa Antibiotics       Physical Exam:     /80 (BP Location: Left arm, Patient Position: Sitting, Cuff Size: Standard)   Pulse 68   Temp (!) 97 4 °F (36 3 °C) (Temporal)   Resp 18   Ht 5' 4" (1 626 m)   Wt 67 3 kg (148 lb 6 4 oz)   LMP 08/15/2020 (Approximate)   SpO2 98%   BMI 25 47 kg/m²     Physical Exam  Vitals and nursing note reviewed  Constitutional:       General: She is not in acute distress  Appearance: Normal appearance  She is well-developed  HENT:      Head: Normocephalic and atraumatic        Right Ear: External ear normal       Left Ear: External ear normal    Eyes:      Conjunctiva/sclera: Conjunctivae normal    Cardiovascular:      Rate and Rhythm: Normal rate and regular rhythm  Heart sounds: No murmur heard  Pulmonary:      Effort: Pulmonary effort is normal  No respiratory distress  Breath sounds: Normal breath sounds  Abdominal:      Palpations: Abdomen is soft  Musculoskeletal:      Cervical back: Neck supple  Skin:     General: Skin is warm and dry  Comments: Erythematous rash on fingers   Neurological:      Mental Status: She is alert            Xu Casas MD  [address]

## 2022-06-20 ENCOUNTER — OFFICE VISIT (OUTPATIENT)
Dept: FAMILY MEDICINE CLINIC | Facility: CLINIC | Age: 52
End: 2022-06-20

## 2022-06-20 ENCOUNTER — TELEPHONE (OUTPATIENT)
Dept: OBGYN CLINIC | Facility: CLINIC | Age: 52
End: 2022-06-20

## 2022-06-20 VITALS
BODY MASS INDEX: 24.92 KG/M2 | HEIGHT: 64 IN | SYSTOLIC BLOOD PRESSURE: 127 MMHG | WEIGHT: 146 LBS | DIASTOLIC BLOOD PRESSURE: 80 MMHG | TEMPERATURE: 98.2 F | HEART RATE: 60 BPM

## 2022-06-20 DIAGNOSIS — M54.50 LOW BACK PAIN, UNSPECIFIED BACK PAIN LATERALITY, UNSPECIFIED CHRONICITY, UNSPECIFIED WHETHER SCIATICA PRESENT: ICD-10-CM

## 2022-06-20 DIAGNOSIS — R30.0 PAINFUL URGING TO URINATE: ICD-10-CM

## 2022-06-20 DIAGNOSIS — N30.01 ACUTE CYSTITIS WITH HEMATURIA: Primary | ICD-10-CM

## 2022-06-20 LAB
SL AMB  POCT GLUCOSE, UA: NEGATIVE
SL AMB LEUKOCYTE ESTERASE,UA: ABNORMAL
SL AMB POCT BILIRUBIN,UA: NEGATIVE
SL AMB POCT BLOOD,UA: ABNORMAL
SL AMB POCT CLARITY,UA: CLEAR
SL AMB POCT COLOR,UA: YELLOW
SL AMB POCT KETONES,UA: NEGATIVE
SL AMB POCT NITRITE,UA: NEGATIVE
SL AMB POCT PH,UA: 7.5
SL AMB POCT SPECIFIC GRAVITY,UA: 1
SL AMB POCT URINE PROTEIN: NEGATIVE
SL AMB POCT UROBILINOGEN: 0.2

## 2022-06-20 PROCEDURE — 99213 OFFICE O/P EST LOW 20 MIN: CPT | Performed by: FAMILY MEDICINE

## 2022-06-20 PROCEDURE — 87086 URINE CULTURE/COLONY COUNT: CPT | Performed by: FAMILY MEDICINE

## 2022-06-20 PROCEDURE — 81002 URINALYSIS NONAUTO W/O SCOPE: CPT | Performed by: FAMILY MEDICINE

## 2022-06-20 RX ORDER — NITROFURANTOIN 25; 75 MG/1; MG/1
100 CAPSULE ORAL 2 TIMES DAILY
Qty: 10 CAPSULE | Refills: 0 | Status: SHIPPED | OUTPATIENT
Start: 2022-06-20 | End: 2022-06-25

## 2022-06-20 NOTE — TELEPHONE ENCOUNTER
Pt states problem has been going on for 4-5 days  Pt states urinating normally, denies burning "just pain when urinating " Pt denies fever but states she had the chills yesterday, took tylenol, felt better but keeps coming back  Pt reports back pain is lower back  Pt aware recommend contacting pcp to r/o uti and if r/o and symptoms continue can call back

## 2022-06-20 NOTE — TELEPHONE ENCOUNTER
Pt called and said she is having vaginal pain and back pain, and pain when urinating  Wants a call back

## 2022-06-20 NOTE — ASSESSMENT & PLAN NOTE
Suprapubic pain and discomfort with urination, decreased urination, concern for UTI  UA positive for moderate leukocytes and blood   - given UA and symptoms, will treat as acute cystitis  - pt has allergy to penicillin and sulfa drugs   - treat with nitrofurantoin 100mg BID x5 days   - send urine for culture and follow for susceptibilities

## 2022-06-20 NOTE — ASSESSMENT & PLAN NOTE
Midline low back pain without red flag symptoms  Likely MSK given recent increased activity and improving on tylenol, exam  - continue supportive care, tylenol and NSAIDs as needed, heat or ice therapy, stretching and rest  - likely will continue to improve  - discussed option of OMT   Pt not interested at this time but knows to make appt if interested   - return to care/follow up precautions reviewed

## 2022-06-20 NOTE — PROGRESS NOTES
Assessment/Plan:    Acute cystitis with hematuria  Suprapubic pain and discomfort with urination, decreased urination, concern for UTI  UA positive for moderate leukocytes and blood   - given UA and symptoms, will treat as acute cystitis  - pt has allergy to penicillin and sulfa drugs   - treat with nitrofurantoin 100mg BID x5 days   - send urine for culture and follow for susceptibilities     Low back pain  Midline low back pain without red flag symptoms  Likely MSK given recent increased activity and improving on tylenol, exam  - continue supportive care, tylenol and NSAIDs as needed, heat or ice therapy, stretching and rest  - likely will continue to improve  - discussed option of OMT  Pt not interested at this time but knows to make appt if interested   - return to care/follow up precautions reviewed        Diagnoses and all orders for this visit:    Acute cystitis with hematuria  -     nitrofurantoin (MACROBID) 100 mg capsule; Take 1 capsule (100 mg total) by mouth 2 (two) times a day for 5 days  -     Urine culture; Future  -     Urine culture    Low back pain, unspecified back pain laterality, unspecified chronicity, unspecified whether sciatica present  -     POCT urine dip    Painful urging to urinate  -     POCT urine dip          Subjective:      Patient ID: Blake Andersen is a 50 y o  female  HPI  51 yo female presenting with back pain and concerns for UTI  Mid low back pain started about 5 days ago  Yesterday very severe and could not bend over  Took tylenol and pt feels better today  Alleve usually helps but pt reports it makes her stomach upset so she tries not to take it  For the past few days have been cleaning house and doing a lot of activity  Yesterday was mowing lawn and that worsened the pain  Denies saddle anesthesia, urinary or fecal incontinence  Urinary symptoms started 2 days ago, yesterday was very bad but pt reports feeling better today   Feels light pain in the groin area when sitting here and when urinating  Pain in suprapubic region but it is not worse when urinating  Not itchy  No burning on urination  Feels like she is urinating less than usual not more frequently  No lesions or vaginal discharge noted  Feels like when she was standing in shower, swelling to genital region and something was coming out  Pt reports having similar symptoms 2 years ago which resolved on its own in 2 weeks and never treated  Denies hx of UTI  No longer menstruating  The following portions of the patient's history were reviewed and updated as appropriate: allergies, current medications, past family history, past medical history, past social history, past surgical history and problem list     Review of Systems   Constitutional: Negative for chills and fever  HENT: Negative for congestion and rhinorrhea  Respiratory: Negative for cough and shortness of breath  Cardiovascular: Negative for chest pain and leg swelling  Gastrointestinal: Negative for abdominal pain, diarrhea, nausea and vomiting  Genitourinary: Positive for decreased urine volume and pelvic pain  Negative for dysuria, frequency, hematuria, vaginal bleeding and vaginal discharge  Musculoskeletal: Positive for back pain  Skin: Negative for rash and wound  Neurological: Negative for dizziness, light-headedness and headaches  Psychiatric/Behavioral: Negative for agitation and confusion  Objective:      /80 (BP Location: Right arm, Patient Position: Sitting, Cuff Size: Adult)   Pulse 60   Temp 98 2 °F (36 8 °C) (Temporal)   Ht 5' 4" (1 626 m)   Wt 66 2 kg (146 lb)   LMP 08/15/2020 (Approximate)   BMI 25 06 kg/m²          Physical Exam  Vitals reviewed  Constitutional:       General: She is not in acute distress  Appearance: She is well-developed  HENT:      Head: Normocephalic and atraumatic  Eyes:      Extraocular Movements: Extraocular movements intact        Conjunctiva/sclera: Conjunctivae normal    Cardiovascular:      Rate and Rhythm: Normal rate and regular rhythm  Heart sounds: Normal heart sounds  No murmur heard  Pulmonary:      Effort: Pulmonary effort is normal  No respiratory distress  Breath sounds: Normal breath sounds  No wheezing or rales  Abdominal:      General: Bowel sounds are normal  There is no distension  Palpations: Abdomen is soft  Tenderness: There is no abdominal tenderness  There is no right CVA tenderness or left CVA tenderness  Musculoskeletal:         General: No tenderness or deformity  Normal range of motion  Cervical back: Normal range of motion and neck supple  Right lower leg: No edema  Left lower leg: No edema  Comments: No tenderness to palpation back  Some muscle tightness lower back   Skin:     General: Skin is warm and dry  Findings: No rash  Neurological:      General: No focal deficit present  Mental Status: She is alert  Mental status is at baseline     Psychiatric:         Mood and Affect: Mood normal          Behavior: Behavior normal              Ja Vora MD, PGY2  [address] [facility]  Date: 6/20/2022 Time: 2:44 PM

## 2022-06-22 LAB — BACTERIA UR CULT: NORMAL

## 2022-07-18 ENCOUNTER — APPOINTMENT (OUTPATIENT)
Dept: LAB | Facility: CLINIC | Age: 52
End: 2022-07-18
Payer: COMMERCIAL

## 2022-07-18 DIAGNOSIS — Z11.4 SCREENING FOR HIV (HUMAN IMMUNODEFICIENCY VIRUS): ICD-10-CM

## 2022-07-18 DIAGNOSIS — Z13.6 SCREENING FOR CARDIOVASCULAR CONDITION: ICD-10-CM

## 2022-07-18 DIAGNOSIS — E03.9 HYPOTHYROIDISM, UNSPECIFIED TYPE: ICD-10-CM

## 2022-07-18 DIAGNOSIS — E55.9 VITAMIN D DEFICIENCY: ICD-10-CM

## 2022-07-18 DIAGNOSIS — D50.9 IRON DEFICIENCY ANEMIA, UNSPECIFIED IRON DEFICIENCY ANEMIA TYPE: ICD-10-CM

## 2022-07-18 DIAGNOSIS — R53.83 FATIGUE, UNSPECIFIED TYPE: ICD-10-CM

## 2022-07-18 LAB
25(OH)D3 SERPL-MCNC: 37.6 NG/ML (ref 30–100)
ALBUMIN SERPL BCP-MCNC: 3.9 G/DL (ref 3.5–5)
ALP SERPL-CCNC: 63 U/L (ref 34–104)
ALT SERPL W P-5'-P-CCNC: 12 U/L (ref 7–52)
ANION GAP SERPL CALCULATED.3IONS-SCNC: 5 MMOL/L (ref 4–13)
AST SERPL W P-5'-P-CCNC: 18 U/L (ref 13–39)
BASOPHILS # BLD AUTO: 0.05 THOUSANDS/ΜL (ref 0–0.1)
BASOPHILS NFR BLD AUTO: 1 % (ref 0–1)
BILIRUB SERPL-MCNC: 0.33 MG/DL (ref 0.2–1)
BUN SERPL-MCNC: 12 MG/DL (ref 5–25)
CALCIUM SERPL-MCNC: 9.4 MG/DL (ref 8.4–10.2)
CHLORIDE SERPL-SCNC: 104 MMOL/L (ref 96–108)
CHOLEST SERPL-MCNC: 207 MG/DL
CO2 SERPL-SCNC: 31 MMOL/L (ref 21–32)
CREAT SERPL-MCNC: 0.74 MG/DL (ref 0.6–1.3)
EOSINOPHIL # BLD AUTO: 0.42 THOUSAND/ΜL (ref 0–0.61)
EOSINOPHIL NFR BLD AUTO: 8 % (ref 0–6)
ERYTHROCYTE [DISTWIDTH] IN BLOOD BY AUTOMATED COUNT: 15.5 % (ref 11.6–15.1)
GFR SERPL CREATININE-BSD FRML MDRD: 94 ML/MIN/1.73SQ M
GLUCOSE P FAST SERPL-MCNC: 85 MG/DL (ref 65–99)
HCT VFR BLD AUTO: 35.7 % (ref 34.8–46.1)
HDLC SERPL-MCNC: 48 MG/DL
HGB BLD-MCNC: 11.3 G/DL (ref 11.5–15.4)
IMM GRANULOCYTES # BLD AUTO: 0.01 THOUSAND/UL (ref 0–0.2)
IMM GRANULOCYTES NFR BLD AUTO: 0 % (ref 0–2)
LDLC SERPL CALC-MCNC: 128 MG/DL (ref 0–100)
LYMPHOCYTES # BLD AUTO: 1.82 THOUSANDS/ΜL (ref 0.6–4.47)
LYMPHOCYTES NFR BLD AUTO: 34 % (ref 14–44)
MCH RBC QN AUTO: 25.7 PG (ref 26.8–34.3)
MCHC RBC AUTO-ENTMCNC: 31.7 G/DL (ref 31.4–37.4)
MCV RBC AUTO: 81 FL (ref 82–98)
MONOCYTES # BLD AUTO: 0.49 THOUSAND/ΜL (ref 0.17–1.22)
MONOCYTES NFR BLD AUTO: 9 % (ref 4–12)
NEUTROPHILS # BLD AUTO: 2.58 THOUSANDS/ΜL (ref 1.85–7.62)
NEUTS SEG NFR BLD AUTO: 48 % (ref 43–75)
NONHDLC SERPL-MCNC: 159 MG/DL
NRBC BLD AUTO-RTO: 0 /100 WBCS
PLATELET # BLD AUTO: 302 THOUSANDS/UL (ref 149–390)
PMV BLD AUTO: 9 FL (ref 8.9–12.7)
POTASSIUM SERPL-SCNC: 3.7 MMOL/L (ref 3.5–5.3)
PROT SERPL-MCNC: 8.3 G/DL (ref 6.4–8.4)
RBC # BLD AUTO: 4.39 MILLION/UL (ref 3.81–5.12)
SODIUM SERPL-SCNC: 140 MMOL/L (ref 135–147)
TRIGL SERPL-MCNC: 154 MG/DL
TSH SERPL DL<=0.05 MIU/L-ACNC: 1.21 UIU/ML (ref 0.45–4.5)
WBC # BLD AUTO: 5.37 THOUSAND/UL (ref 4.31–10.16)

## 2022-07-18 PROCEDURE — 80053 COMPREHEN METABOLIC PANEL: CPT

## 2022-07-18 PROCEDURE — 84443 ASSAY THYROID STIM HORMONE: CPT

## 2022-07-18 PROCEDURE — 82306 VITAMIN D 25 HYDROXY: CPT

## 2022-07-18 PROCEDURE — 80061 LIPID PANEL: CPT

## 2022-07-18 PROCEDURE — 87389 HIV-1 AG W/HIV-1&-2 AB AG IA: CPT

## 2022-07-18 PROCEDURE — 85025 COMPLETE CBC W/AUTO DIFF WBC: CPT

## 2022-07-18 PROCEDURE — 36415 COLL VENOUS BLD VENIPUNCTURE: CPT

## 2022-07-19 LAB — HIV 1+2 AB+HIV1 P24 AG SERPL QL IA: NORMAL

## 2022-07-27 ENCOUNTER — TELEPHONE (OUTPATIENT)
Dept: FAMILY MEDICINE CLINIC | Facility: CLINIC | Age: 52
End: 2022-07-27

## 2022-07-27 NOTE — TELEPHONE ENCOUNTER
----- Message from Michael Ivory LPN sent at 4/69/5305  4:20 PM EDT -----  Please call patient to schedule f/u for lab work  Thanks!

## 2022-08-04 ENCOUNTER — OFFICE VISIT (OUTPATIENT)
Dept: FAMILY MEDICINE CLINIC | Facility: CLINIC | Age: 52
End: 2022-08-04

## 2022-08-04 VITALS
SYSTOLIC BLOOD PRESSURE: 110 MMHG | DIASTOLIC BLOOD PRESSURE: 71 MMHG | TEMPERATURE: 98.7 F | BODY MASS INDEX: 25.16 KG/M2 | WEIGHT: 147.4 LBS | HEART RATE: 74 BPM | RESPIRATION RATE: 18 BRPM | HEIGHT: 64 IN | OXYGEN SATURATION: 98 %

## 2022-08-04 DIAGNOSIS — M79.671 PAIN OF RIGHT HEEL: Primary | ICD-10-CM

## 2022-08-04 PROCEDURE — 99214 OFFICE O/P EST MOD 30 MIN: CPT | Performed by: FAMILY MEDICINE

## 2022-08-04 NOTE — PROGRESS NOTES
Assessment/Plan:    Pain of right heel  Right posterior heel pain worse upon awakening and better after 30 minutes-1 hour after awakening after some movement  Edema vs protrusion noted on right posterior heel  Differentials include bone spur, OA, or RA of right heel  X-ray of right ankle ordered  Will call patient with results  Continue heat pack and ice for pain relief as needed          Problem List Items Addressed This Visit        Other    Pain of right heel - Primary     Right posterior heel pain worse upon awakening and better after 30 minutes-1 hour after awakening after some movement  Edema vs protrusion noted on right posterior heel  Differentials include bone spur, OA, or RA of right heel  X-ray of right ankle ordered  Will call patient with results  Continue heat pack and ice for pain relief as needed          Relevant Orders    XR ankle 3+ vw right            Subjective:      Patient ID: Zee Masterson is a 50 y o  female  Patient presents with ongoing right posterior heel pain that began 2 months ago  Patient reports that the pain constant, worse in the morning upon awakening and then improves in 30 minutes to one hour after awakening  Pain is constant and worse with putting pressure on the foot  She reports severe pain with walking  Pain is 1-2/10 when sitting  She denies trauma or fall history, numbness, and tingling  Patient has tried heat pack, ice, and Voltaren, which have not provided much relief  The following portions of the patient's history were reviewed and updated as appropriate: allergies, current medications, past family history, past medical history, past social history, past surgical history and problem list     Review of Systems   Constitutional: Negative for chills and fever  HENT: Negative for ear pain and sore throat  Eyes: Negative for pain and visual disturbance  Respiratory: Negative for cough and shortness of breath      Cardiovascular: Negative for chest pain and palpitations  Gastrointestinal: Negative for abdominal pain, constipation, diarrhea, nausea and vomiting  Genitourinary: Negative for dysuria and hematuria  Musculoskeletal: Positive for arthralgias (Hand joint pain that improves with hand exercises ) and gait problem  Negative for back pain  Skin: Negative for color change and rash  Neurological: Negative for seizures, syncope and headaches  All other systems reviewed and are negative  Objective:      /71 (BP Location: Right arm, Patient Position: Sitting, Cuff Size: Adult)   Pulse 74   Temp 98 7 °F (37 1 °C) (Temporal)   Resp 18   Ht 5' 4" (1 626 m)   Wt 66 9 kg (147 lb 6 4 oz)   LMP 08/15/2020 (Approximate)   SpO2 98%   BMI 25 30 kg/m²          Physical Exam  Constitutional:       Appearance: Normal appearance  HENT:      Head: Normocephalic and atraumatic  Cardiovascular:      Rate and Rhythm: Normal rate and regular rhythm  Pulses: Normal pulses  Heart sounds: Normal heart sounds  Pulmonary:      Effort: Pulmonary effort is normal  No respiratory distress  Breath sounds: Normal breath sounds  No wheezing  Musculoskeletal:      Right lower leg: No edema  Left lower leg: No edema  Feet:    Feet:      Comments: Tenderness to palpation of the right posterior heel just below Achilles insertion with edema/ protrusion ~3cm in diameter and ~1cm in depth  Skin:     General: Skin is warm and dry  Capillary Refill: Capillary refill takes less than 2 seconds  Neurological:      Mental Status: She is alert  Psychiatric:         Mood and Affect: Mood normal          Behavior: Behavior normal          Thought Content:  Thought content normal          Venanico Lopez DO  Family Medicine Resident, PGY3  2:28 PM

## 2022-08-04 NOTE — ASSESSMENT & PLAN NOTE
Right posterior heel pain worse upon awakening and better after 30 minutes-1 hour after awakening after some movement  Edema vs protrusion noted on right posterior heel  Differentials include bone spur, OA, or RA of right heel  · X-ray of right ankle ordered  Will call patient with results     · Continue heat pack and ice for pain relief as needed

## 2022-08-05 ENCOUNTER — HOSPITAL ENCOUNTER (OUTPATIENT)
Dept: RADIOLOGY | Facility: HOSPITAL | Age: 52
Discharge: HOME | End: 2022-08-05
Payer: COMMERCIAL

## 2022-08-05 DIAGNOSIS — M79.671 PAIN OF RIGHT HEEL: ICD-10-CM

## 2022-08-05 PROCEDURE — 73610 X-RAY EXAM OF ANKLE: CPT

## 2022-08-08 DIAGNOSIS — M79.671 PAIN OF RIGHT HEEL: Primary | ICD-10-CM

## 2022-08-22 ENCOUNTER — EVALUATION (OUTPATIENT)
Dept: PHYSICAL THERAPY | Facility: CLINIC | Age: 52
End: 2022-08-22
Payer: COMMERCIAL

## 2022-08-22 DIAGNOSIS — M79.671 RIGHT FOOT PAIN: Primary | ICD-10-CM

## 2022-08-22 PROCEDURE — 97161 PT EVAL LOW COMPLEX 20 MIN: CPT | Performed by: PHYSICAL THERAPIST

## 2022-08-22 PROCEDURE — 97110 THERAPEUTIC EXERCISES: CPT | Performed by: PHYSICAL THERAPIST

## 2022-08-22 PROCEDURE — 97112 NEUROMUSCULAR REEDUCATION: CPT | Performed by: PHYSICAL THERAPIST

## 2022-08-22 NOTE — PROGRESS NOTES
PT Evaluation     Today's date: 2022  Patient name: Albino Fontaine  : 1970  MRN: [id number]  Referring provider: Amol Bosch DO  Dx:   Encounter Diagnosis     ICD-10-CM    1  Right foot pain  M79 671                   Assessment  Assessment details: Pt presents with signs and symptoms synonymous of admitting diagnosis as well as dorsiflexion Derangement with DP of semi-loaded repeated dorsiflexion  She was educated to perform this 4-8x's a day for 5-10 reps  For AM pain or static position pain educated to perform unloaded  She does have an interesting R > L LE symptoms as well as a history of low back pain and will continue to Pt presents with pain, decreased strength, decreased range, flexibility, as well as tolerance to activity and ambulation tolerance  Pt would benefit skilled PT intervention in order to address these impairments in order to be able to perform all desired activities with minimal to nil symptom exacerbation  Based on today's session she will likely have a strong outcome, but if she fails to find improvement over the next 3-4 weeks, an appropriate referral can be performed  Thank you very much for this kind referral      Impairments: abnormal gait, abnormal or restricted ROM, activity intolerance, impaired balance, impaired physical strength, lacks appropriate home exercise program, pain with function, poor posture  and poor body mechanics  Understanding of Dx/Px/POC: good   Prognosis: good    Goals  STG 4 Weeks:  Decrease pain at worst to 4  Improve range to improve DF to 5, Gtoe to 60  Improve strength to improve R foot to 4/5 or better  Independent with HEP  LTG 8 Weeks:  Decrease pain at worst to 2  Improve range to DF to 10, GToe to 70  Improve strength to R foot 5/5  SLR HR 10 pain free     Able to perform all desired activities with minimal to nil symptom exacerbation      Plan  Patient would benefit from: skilled physical therapy  Planned modality interventions: cryotherapy, TENS and thermotherapy: hydrocollator packs  Planned therapy interventions: activity modification, manual therapy, neuromuscular re-education, patient education, postural training, strengthening, stretching, therapeutic activities, therapeutic exercise, therapeutic training, transfer training, home exercise program, graded motor, graded exercise, graded activity, gait training, functional ROM exercises, flexibility, abdominal trunk stabilization, balance, behavior modification and body mechanics training  Frequency: 2x week  Duration in weeks: 10  Treatment plan discussed with: patient        Subjective Evaluation    History of Present Illness  Date of onset: 8/22/2022  Mechanism of injury: Pt is a 50 yofemale who presents today stating that insidiously she developed R heal pain, that began about 2 months ago  She noticed it first thing AM, Pt reports that it is very sharp, and she has difficulty walking initially, but after 30 minutes or so she has been able to be more mobile  Pt reports recently though if she stands for more then 1 hour or so, the foot will become bothersome again  She reports that ice has been assisting, she states that heat and ice combination is also assisting  She reports that about 1 month she developed pain in the evening as well  Pt reports that there are periods of time without pain but only with use of ice  Pt reports that at worst is 6/10  Pt reports that if she is standing and has periods of time without pain, she will sit down and rest   Pt reports that primarily the pain is at her heal, she is occasionally getting pain in her toes as well  Pt reports that she will also get pain in her calf and top of foot  Pt reports occasional pain in her thigh, both front and back and having symptoms into her L thigh as well    Pt reports that she does have a history of back pain, she states that she had UTI earlier this year that had back pain, but she reports that she was having back pain 2 years ago does not recall leg symptoms, Pt reports that she had few appointments with PT and thus got better  Pt reports that in the evening that she is having some difficulty falling asleep due to pain, and she will awaken due to pain  If she is compliant with use of ice is helpful  Pt reports today from family physician who had imagery performed (-) of fracture and was referred to PT and thus presents today  Pt reports that her goals are to reduce pain, improve standing and walking tolerance, improve sleeping ability, being able to transition quickly from a seated position  Pt denies any change in walking or exercise hobbies prior to this injury onset  Quality of life: good    Pain  Current pain ratin  At best pain ratin  At worst pain ratin  Quality: dull ache, tight and sharp  Relieving factors: ice, heat, rest and change in position  Aggravating factors: standing, walking, stair climbing and lifting  Progression: worsening      Diagnostic Tests  X-ray: normal  Treatments  Previous treatment: medication  Patient Goals  Patient goals for therapy: increased strength, return to sport/leisure activities, increased motion, improved balance and decreased pain          Objective     Active Range of Motion   Left Ankle/Foot   Dorsiflexion (ke): 10 degrees   Plantar flexion: 60 degrees   Great toe extension: 70 degrees     Right Ankle/Foot   Dorsiflexion (ke): 0 degrees with pain  Plantar flexion: 60 degrees   Great toe extension: 55 degrees with pain    Additional Active Range of Motion Details  Forward head, rounded shoulders, Lordosis  Genu Valgum R >L antalgia with R IC and swing phase  B/L Sensation intact to L3,4,5,S1,S2  DTR Patella 2+ B Heartwell B 1+   Postural correction R achilles pain and foot  L foot pain      Repeated motion /  LS ROM  Flex min   Ext min  SB R Min  SB L Min  LE Strength  Hip   L Flex 4 Ext 5 Abd 4 Add 5  R Flex 4 Ext 5 Abd 4 Add 5  LE Screen  Knee Strength  L  5/5 flex ext  R5/5 flex/ ext  Ankle Screen:  L  DF 5/5 Gtoe SL HR 5  R DF 5/5 Gtoe 3+ SL HR 1*  Joint Mobility  Palpation  Sts    LE Repeated Movements    Repeated DF unloaded  N B  Semi-loaded DF D B> A  Pain with DF OP R   // B>>A  SL HR D B  Precautions: Hyperthyroidism, Anemia Hx and Fall Concern      Manuals 8/22            continue to monitor LS  Neuro Re-Ed             Education and Progression 10 min            SLS EO>>EC             SLS EO Foam                                                                 Ther Ex             Repeated DF unloaded NB            Repeated Semi-loaded DF D B>>A            Progression ?                                                   gastroc ST with wedge? Ther Activity             PT DF OP A            SL HR B            Ambulation  B            Further concern?                           Modalities             CP to R ankle PRN

## 2022-08-25 ENCOUNTER — OFFICE VISIT (OUTPATIENT)
Dept: PHYSICAL THERAPY | Facility: CLINIC | Age: 52
End: 2022-08-25
Payer: COMMERCIAL

## 2022-08-25 DIAGNOSIS — M79.671 RIGHT FOOT PAIN: Primary | ICD-10-CM

## 2022-08-25 PROCEDURE — 97112 NEUROMUSCULAR REEDUCATION: CPT | Performed by: PHYSICAL THERAPIST

## 2022-08-25 PROCEDURE — 97110 THERAPEUTIC EXERCISES: CPT | Performed by: PHYSICAL THERAPIST

## 2022-08-25 NOTE — PROGRESS NOTES
Daily Note     Today's date: 2022  Patient name: Esthela Pierre  : 1970  MRN: [id number]  Referring provider: Temitope San DO  Dx:   Encounter Diagnosis     ICD-10-CM    1  Right foot pain  M79 671                   Subjective: Pt presents today stating that she has not had any night pain since her IE 3 days ago  Pt reports having some B leg soreness, but this went away, she states during the day the foot also seems to be improving as well  Has been performing unloaded DF, having mild back pain on L during the Semi loaded DF  Objective: See treatment diary below    Pt reports that she has equal top and bottom of foot pain, no heal pain  Assessment:  Pain in heal only produced with HR, abolished with semi loaded DF, fatigues, likely experiencing gross DOMs, educated about these presentations, pt in complete accord  Pt progressing very well otherwise  Precautions: Hyperthyroidism, Anemia Hx and Fall Concern      Manuals            continue to monitor LS  Neuro Re-Ed             Education and Progression 10 min 10 Min           SLS EO>>EC  20" x 4           SLS EO Foam  20" x 4                                                               Ther Ex             Repeated DF unloaded NB 3 x 10           Repeated Semi-loaded DF D B>>A 4 x 10 D B  Progression ?                                                   gastroc ST with wedge?  20" x 4                        Ther Activity             PT DF OP A Nil today  SL HR B DL HR, 2 x 10 P, R heal            Ambulation  B B           Further concern? review HEP                           Modalities             CP to R ankle PRN  dec

## 2022-08-30 ENCOUNTER — OFFICE VISIT (OUTPATIENT)
Dept: PHYSICAL THERAPY | Facility: CLINIC | Age: 52
End: 2022-08-30
Payer: COMMERCIAL

## 2022-08-30 DIAGNOSIS — M79.671 RIGHT FOOT PAIN: Primary | ICD-10-CM

## 2022-08-30 PROCEDURE — 97110 THERAPEUTIC EXERCISES: CPT

## 2022-08-30 PROCEDURE — 97112 NEUROMUSCULAR REEDUCATION: CPT

## 2022-08-30 PROCEDURE — 97530 THERAPEUTIC ACTIVITIES: CPT

## 2022-08-30 NOTE — PROGRESS NOTES
Daily Note     Today's date: 2022  Patient name: Daryel Alpers  : 1970  MRN: [id number]  Referring provider: Enmanuel Brower DO  Dx:   Encounter Diagnosis     ICD-10-CM    1  Right foot pain  M79 671                   Subjective: Pt reports that she has some heel pain today and some R thigh soreness from exercise performance  Still notes difficulty with transitioning from sit<>stand  Objective: See treatment diary below    Pt presents with R heel pain    Assessment:  Pt tolerated session well  Improved tolerance to TE this visit and pt only had occurrence of pain during SLS that was immediately resolved with repeated semiloaded DF  Plan: Continue per PT POC  Precautions: Hyperthyroidism, Anemia Hx and Fall Concern      Manuals           continue to monitor LS  Neuro Re-Ed             Education and Progression 10 min 10 Min 5 min          SLS EO>>EC  20" x 4 20"x5          SLS EO Foam  20" x 4 20"x4                                                               Ther Ex             Repeated DF unloaded NB 3 x 10 3x10          Repeated Semi-loaded DF D B>>A 4 x 10 D B    3x10           Progression ?                                                   gastroc ST with wedge?  20" x 4 20"x4                       Ther Activity             PT DF OP A Nil today  SL HR B DL HR, 2 x 10 P, R heal  DL 2x10          Ambulation  B B B          Further concern? review HEP              minisquat   2x10          Modalities             CP to R ankle PRN  dec

## 2022-09-01 ENCOUNTER — OFFICE VISIT (OUTPATIENT)
Dept: PHYSICAL THERAPY | Facility: CLINIC | Age: 52
End: 2022-09-01
Payer: COMMERCIAL

## 2022-09-01 DIAGNOSIS — M79.671 RIGHT FOOT PAIN: Primary | ICD-10-CM

## 2022-09-01 PROCEDURE — 97110 THERAPEUTIC EXERCISES: CPT

## 2022-09-01 PROCEDURE — 97530 THERAPEUTIC ACTIVITIES: CPT

## 2022-09-01 PROCEDURE — 97112 NEUROMUSCULAR REEDUCATION: CPT

## 2022-09-01 NOTE — PROGRESS NOTES
Daily Note     Today's date: 2022  Patient name: Raul Davis  : 1970  MRN: [id number]  Referring provider: Robin Taylor DO  Dx:   Encounter Diagnosis     ICD-10-CM    1  Right foot pain  M79 671        Start Time: 1430  Stop Time: 1500  Total time in clinic (min): 30 minutes    Subjective: Pt reports that she was taking care of her  and son and did not have a chance to do her HEP  She notices that she feels a little worse when she doesn't do her HEP  Objective: See treatment diary below    Pt presents with R heel pain    Assessment:  Pt tolerated session well  Able to tolerate exercises without increase in symptoms  Plan: Continue per PT POC  Precautions: Hyperthyroidism, Anemia Hx and Fall Concern      Manuals          continue to monitor LS  Neuro Re-Ed             Education and Progression 10 min 10 Min 5 min 5 min          SLS EO>>EC  20" x 4 20"x5 20"x5         SLS EO Foam  20" x 4 20"x4  20"x5                                                             Ther Ex             Repeated DF unloaded NB 3 x 10 3x10          Repeated Semi-loaded DF D B>>A 4 x 10 D B    3x10  3x10         Progression ?                                                   gastroc ST with wedge?  20" x 4 20"x4 20"x4                      Ther Activity             PT DF OP A Nil today  SL HR B DL HR, 2 x 10 P, R heal  DL 2x10 DL 2x10         Ambulation  B B B          Further concern? review HEP              minisquat   2x10 2x10         Modalities             CP to R ankle PRN  dec

## 2022-09-07 ENCOUNTER — OFFICE VISIT (OUTPATIENT)
Dept: PHYSICAL THERAPY | Facility: CLINIC | Age: 52
End: 2022-09-07
Payer: COMMERCIAL

## 2022-09-07 DIAGNOSIS — M79.671 RIGHT FOOT PAIN: Primary | ICD-10-CM

## 2022-09-07 PROCEDURE — 97112 NEUROMUSCULAR REEDUCATION: CPT | Performed by: PHYSICAL THERAPIST

## 2022-09-07 PROCEDURE — 97110 THERAPEUTIC EXERCISES: CPT | Performed by: PHYSICAL THERAPIST

## 2022-09-09 ENCOUNTER — OFFICE VISIT (OUTPATIENT)
Dept: PHYSICAL THERAPY | Facility: CLINIC | Age: 52
End: 2022-09-09
Payer: COMMERCIAL

## 2022-09-09 DIAGNOSIS — M79.671 RIGHT FOOT PAIN: Primary | ICD-10-CM

## 2022-09-09 PROCEDURE — 97110 THERAPEUTIC EXERCISES: CPT | Performed by: PHYSICAL THERAPIST

## 2022-09-09 PROCEDURE — 97112 NEUROMUSCULAR REEDUCATION: CPT | Performed by: PHYSICAL THERAPIST

## 2022-09-09 PROCEDURE — 97530 THERAPEUTIC ACTIVITIES: CPT | Performed by: PHYSICAL THERAPIST

## 2022-09-09 NOTE — PROGRESS NOTES
Daily Note     Today's date: 2022  Patient name: Sierra Grimes  : 1970  MRN: [id number]  Referring provider: Skye Castano DO  Dx:   Encounter Diagnosis     ICD-10-CM    1  Right foot pain  M79 671                   Subjective: Pt presents today stating that she was feeling really good after last session  Pt reports that she had some pain in store yesterday, has recovered  Reports HEP only 2-3x's that day  Pt reports that if she sits in a compromised position it will bother her  Objective: See treatment diary below      Assessment: Reviewed and discussed performance of HEP and increase during periods of irritation, pt in accord  Mild fatigue with HR/TR, required rest        Precautions: Hyperthyroidism, Anemia Hx and Fall Concern      Manuals        continue to monitor LS  Neuro Re-Ed             Education and Progression 10 min 10 Min 5 min 5 min  6 min 5 min       SLS EO>>EC  20" x 4 20"x5 20"x5 20" x 5 20" x 5       SLS EO Foam  20" x 4 20"x4  20"x5 20" x 5 20" x 5                    NBOS EC      1 min Foam                                 Ther Ex             Repeated DF unloaded NB 3 x 10 3x10          Repeated Semi-loaded DF D B>>A 4 x 10 D B    3x10  3x10 3 x 10 3 x 10       Progression ? LP + HR      75# nv?                    gastroc ST with wedge?  20" x 4 20"x4 20"x4 20" x 4 20" x 4                    Ther Activity             PT DF OP A Nil today              SL HR B DL HR, 2 x 10 P, R heal  DL 2x10 DL 2x10 DL 3 x 10 DL 3 x 10       Ambulation  B B B          Elevations     FF FF       minisquat   2x10 2x10 2 x 10 2 x 10       Modalities             CP to R ankle PRN  dec    CP x 10 min

## 2022-09-12 ENCOUNTER — APPOINTMENT (OUTPATIENT)
Dept: PHYSICAL THERAPY | Facility: CLINIC | Age: 52
End: 2022-09-12
Payer: COMMERCIAL

## 2022-09-14 ENCOUNTER — OFFICE VISIT (OUTPATIENT)
Dept: PHYSICAL THERAPY | Facility: CLINIC | Age: 52
End: 2022-09-14
Payer: COMMERCIAL

## 2022-09-14 DIAGNOSIS — M79.671 RIGHT FOOT PAIN: Primary | ICD-10-CM

## 2022-09-14 PROCEDURE — 97530 THERAPEUTIC ACTIVITIES: CPT

## 2022-09-14 PROCEDURE — 97110 THERAPEUTIC EXERCISES: CPT

## 2022-09-14 PROCEDURE — 97112 NEUROMUSCULAR REEDUCATION: CPT

## 2022-09-14 NOTE — PROGRESS NOTES
Daily Note     Today's date: 2022  Patient name: Elliott Lancaster  : 1970  MRN: [id number]  Referring provider: Alida Felipe DO  Dx:   Encounter Diagnosis     ICD-10-CM    1  Right foot pain  M79 671                   Subjective: Pt reports that she does get some thigh soreness from performing her HEP which limits the amount of times she performed it  Otherwise content with slow and steady progression  Objective: See treatment diary below      Assessment: Pt tolerated session well  Fatigues with addition of legpress HR and LP  Improving balance  Plan: Continue per PT POC      Precautions: Hyperthyroidism, Anemia Hx and Fall Concern      Manuals       continue to monitor LS  Neuro Re-Ed             Education and Progression 10 min 10 Min 5 min 5 min  6 min 5 min 5 min      SLS EO>>EC  20" x 4 20"x5 20"x5 20" x 5 20" x 5 20"x5      SLS EO Foam  20" x 4 20"x4  20"x5 20" x 5 20" x 5 20"x5                   NBOS EC      1 min Foam 1 min foam                                Ther Ex             Repeated DF unloaded NB 3 x 10 3x10          Repeated Semi-loaded DF D B>>A 4 x 10 D B    3x10  3x10 3 x 10 3 x 10 3x10      Progression ? LP + HR      75# nv? 65# 2x10                    gastroc ST with wedge?  20" x 4 20"x4 20"x4 20" x 4 20" x 4 20"x4                   Ther Activity             PT DF OP A Nil today              SL HR B DL HR, 2 x 10 P, R heal  DL 2x10 DL 2x10 DL 3 x 10 DL 3 x 10 DL 3x10      Ambulation  B B B          Elevations     FF FF FF      minisquat   2x10 2x10 2 x 10 2 x 10 2x10      Modalities             CP to R ankle PRN  dec    CP x 10 min decline

## 2022-09-16 DIAGNOSIS — E03.9 HYPOTHYROIDISM, UNSPECIFIED TYPE: ICD-10-CM

## 2022-09-16 RX ORDER — LEVOTHYROXINE SODIUM 137 UG/1
137 TABLET ORAL DAILY
Qty: 90 TABLET | Refills: 1 | Status: SHIPPED | OUTPATIENT
Start: 2022-09-16

## 2022-09-19 ENCOUNTER — EVALUATION (OUTPATIENT)
Dept: PHYSICAL THERAPY | Facility: CLINIC | Age: 52
End: 2022-09-19
Payer: COMMERCIAL

## 2022-09-19 DIAGNOSIS — M79.671 RIGHT FOOT PAIN: Primary | ICD-10-CM

## 2022-09-19 PROCEDURE — 97530 THERAPEUTIC ACTIVITIES: CPT | Performed by: PHYSICAL THERAPIST

## 2022-09-19 PROCEDURE — 97110 THERAPEUTIC EXERCISES: CPT | Performed by: PHYSICAL THERAPIST

## 2022-09-19 PROCEDURE — 97112 NEUROMUSCULAR REEDUCATION: CPT | Performed by: PHYSICAL THERAPIST

## 2022-09-19 NOTE — PROGRESS NOTES
PT Evaluation     Today's date: 2022  Patient name: Kalpesh Lee  : 1970  MRN: [id number]  Referring provider: Weston Montenegro DO  Dx:   Encounter Diagnosis     ICD-10-CM    1  Right foot pain  M79 671                   Assessment  Assessment details: Pt is progressing quite well at this time  They have demonstrated improvement in pain level, strength, range, flexibility, tolerance to activity as well as endurance with mobility  They have achieved all STGs sought out for them as well as by them  They will benefit continued Skilled PT intervention in order to achieve all LTGs sought out for them as well as by them in order to perform all desired activities with minimal to nil symptom exacerbation  Thank you very much for this kind and motivated referral       Impairments: abnormal gait, abnormal or restricted ROM, activity intolerance, impaired balance, impaired physical strength, lacks appropriate home exercise program, pain with function, poor posture  and poor body mechanics  Understanding of Dx/Px/POC: good   Prognosis: good    Goals  STG 4 Weeks:  Decrease pain at worst to 4 -met  Improve range to improve DF to 5, Gtoe to 60 -met  Improve strength to improve R foot to 4/5 or better  -met    Independent with HEP -met  LTG 8 Weeks:  Decrease pain at worst to 2  Improve range to DF to 10, GToe to 70  Improve strength to R foot 5/5  SLR HR 10 pain free     Able to perform all desired activities with minimal to nil symptom exacerbation      Plan  Patient would benefit from: skilled physical therapy  Planned modality interventions: cryotherapy, TENS and thermotherapy: hydrocollator packs  Planned therapy interventions: activity modification, manual therapy, neuromuscular re-education, patient education, postural training, strengthening, stretching, therapeutic activities, therapeutic exercise, therapeutic training, transfer training, home exercise program, graded motor, graded exercise, graded activity, gait training, functional ROM exercises, flexibility, abdominal trunk stabilization, balance, behavior modification and body mechanics training  Frequency: 2x week  Duration in weeks: 10  Treatment plan discussed with: patient        Subjective Evaluation    History of Present Illness  Date of onset: 2022  Mechanism of injury: Pt presents today stating as a whole is getting better  Pt reports that pain at worst has been 4/10  Pt reports that overall standing, walking performing tasks in home is better  Pt reports that she had intended a trip to [facility] [employer] [place] to walk and shop all day but it got RS'd, but she is confident she could accomplish it  Pt reports first thing in the AM pain is better, pain only is tough after a few hours of consistent standing or walking  Pt reports that she is not having the thigh pain that she was prior to initiating care either  Pt reports stairs are improving as well  Pt reports that her goals are to be able to continue to reduce pain and improve walking endurance  Denies issues with sleeping     Quality of life: good    Pain  Current pain ratin  At best pain ratin  At worst pain ratin  Quality: dull ache, tight and sharp  Relieving factors: ice, heat, rest and change in position  Aggravating factors: standing, walking, stair climbing and lifting  Progression: worsening      Diagnostic Tests  X-ray: normal  Treatments  Previous treatment: medication  Patient Goals  Patient goals for therapy: increased strength, return to sport/leisure activities, increased motion, improved balance and decreased pain          Objective     Active Range of Motion   Left Ankle/Foot   Dorsiflexion (ke): 10 degrees   Plantar flexion: 60 degrees   Great toe extension: 70 degrees     Right Ankle/Foot   Dorsiflexion (ke): 5 degrees with pain  Plantar flexion: 60 degrees   Great toe extension: 70 degrees with pain    Additional Active Range of Motion Details  Forward head, rounded shoulders, Lordosis  Genu Valgum R >L antalgia with R IC and swing phase  B/L Sensation intact to L3,4,5,S1,S2  DTR Patella 2+ B Seven B 1+   Postural correction R achilles pain and foot  L foot pain  Repeated motion /  LS ROM  Flex min   Ext min  SB R Min  SB L Min  LE Strength  Hip   L Flex 4 Ext 5 Abd 4 Add 5  R Flex 4 Ext 5 Abd 4 Add 5  LE Screen  Knee Strength  L  5/5 flex ext  R5/5 flex/ ext  Ankle Screen:  L  DF 5/5 Gtoe SL HR 5  R DF 5/5 Gtoe 4 SL HR 5  Joint Mobility  Palpation  Sts    LE Repeated Movements    Repeated DF unloaded  N B  Semi-loaded DF D B> A  Pain with DF OP R   // B>>A (remains)   SL HR D B  (remains)                 Precautions: Hyperthyroidism, Anemia Hx and Fall Concern      Manuals 8/22 8/25 08/30 09/01 9/7 9/9 09/14 9/19     continue to monitor LS  Neuro Re-Ed             Education and Progression 10 min 10 Min 5 min 5 min  6 min 5 min 5 min 5 min     SLS EO>>EC  20" x 4 20"x5 20"x5 20" x 5 20" x 5 20"x5 20" x 5     SLS EO Foam  20" x 4 20"x4  20"x5 20" x 5 20" x 5 20"x5 20" x 5                  NBOS EC      1 min Foam 1 min foam 1 min                               Ther Ex             Repeated DF unloaded NB 3 x 10 3x10          Repeated Semi-loaded DF D B>>A 4 x 10 D B    3x10  3x10 3 x 10 3 x 10 3x10 3 x 10     Progression ? LP + HR      75# nv? 65# 2x10  65# 2 x 10                  gastroc ST with wedge?  20" x 4 20"x4 20"x4 20" x 4 20" x 4 20"x4 20" x 4                  Ther Activity             PT DF OP A Nil today              SL HR B DL HR, 2 x 10 P, R heal  DL 2x10 DL 2x10 DL 3 x 10 DL 3 x 10 DL 3x10 DL 3x10, SL x 10     Ambulation  B B B          Elevations     FF FF FF FF     minisquat   2x10 2x10 2 x 10 2 x 10 2x10 2 x 10     Modalities             CP to R ankle PRN  dec    CP x 10 min decline

## 2022-09-21 ENCOUNTER — OFFICE VISIT (OUTPATIENT)
Dept: PHYSICAL THERAPY | Facility: CLINIC | Age: 52
End: 2022-09-21
Payer: COMMERCIAL

## 2022-09-21 DIAGNOSIS — M79.671 RIGHT FOOT PAIN: Primary | ICD-10-CM

## 2022-09-21 PROCEDURE — 97110 THERAPEUTIC EXERCISES: CPT

## 2022-09-21 PROCEDURE — 97112 NEUROMUSCULAR REEDUCATION: CPT

## 2022-09-21 PROCEDURE — 97530 THERAPEUTIC ACTIVITIES: CPT

## 2022-09-21 NOTE — PROGRESS NOTES
Daily Note     Today's date: 2022  Patient name: Esthela Pierre  : 1970  MRN: [id number]  Referring provider: Temitope San DO  Dx:   Encounter Diagnosis     ICD-10-CM    1  Right foot pain  M79 671        Start Time: 1100  Stop Time: 1140  Total time in clinic (min): 40 minutes    Subjective: Pt reports that things are going well  She feels challenged by her current program      Objective: See treatment diary below      Assessment: Tolerated treatment well  Challenged with EC SLS  Patient demonstrated fatigue post treatment      Plan: Continue per plan of care  Precautions: Hyperthyroidism, Anemia Hx and Fall Concern      Manuals     continue to monitor LS  Neuro Re-Ed             Education and Progression 10 min 10 Min 5 min 5 min  6 min 5 min 5 min 5 min 5 min    SLS EO>>EC  20" x 4 20"x5 20"x5 20" x 5 20" x 5 20"x5 20" x 5 EC  20"x4    SLS EO Foam  20" x 4 20"x4  20"x5 20" x 5 20" x 5 20"x5 20" x 5 20"x5                 NBOS EC      1 min Foam 1 min foam 1 min 1 min                              Ther Ex             Repeated DF unloaded NB 3 x 10 3x10          Repeated Semi-loaded DF D B>>A 4 x 10 D B    3x10  3x10 3 x 10 3 x 10 3x10 3 x 10 3x10    Progression ? LP + HR      75# nv? 65# 2x10  65# 2 x 10 65# 2x10                 gastroc ST with wedge?  20" x 4 20"x4 20"x4 20" x 4 20" x 4 20"x4 20" x 4 20"x4                 Ther Activity             PT DF OP A Nil today              SL HR B DL HR, 2 x 10 P, R heal  DL 2x10 DL 2x10 DL 3 x 10 DL 3 x 10 DL 3x10 DL 3x10, SL x 10 DL 3x10, SL x 10    Ambulation  B B B          Elevations     FF FF FF FF FF    minisquat   2x10 2x10 2 x 10 2 x 10 2x10 2 x 10 2x10    Modalities             CP to R ankle PRN  dec    CP x 10 min decline  10 min

## 2022-09-26 ENCOUNTER — OFFICE VISIT (OUTPATIENT)
Dept: PHYSICAL THERAPY | Facility: CLINIC | Age: 52
End: 2022-09-26
Payer: COMMERCIAL

## 2022-09-26 DIAGNOSIS — M79.671 RIGHT FOOT PAIN: Primary | ICD-10-CM

## 2022-09-26 PROCEDURE — 97530 THERAPEUTIC ACTIVITIES: CPT

## 2022-09-26 PROCEDURE — 97110 THERAPEUTIC EXERCISES: CPT

## 2022-09-26 PROCEDURE — 97112 NEUROMUSCULAR REEDUCATION: CPT

## 2022-09-26 NOTE — PROGRESS NOTES
Daily Note     Today's date: 2022  Patient name: Kalpehs Lee  : 1970  MRN: [id number]  Referring provider: Weston Montenegro DO  Dx:   Encounter Diagnosis     ICD-10-CM    1  Right foot pain  M79 671        Start Time: 1100  Stop Time: 1140  Total time in clinic (min): 40 minutes    Subjective: Pt reports that her R foot feels good but her other foot is starting to bother her  Pt notes that she gets fatigued quickly at home with housework  Objective: See treatment diary below  Decreased FOTO score  Assessment: Tolerated treatment well  Progressed with LE strengthening  Patient demonstrated fatigue post treatment      Plan: Continue per plan of care  Precautions: Hyperthyroidism, Anemia Hx and Fall Concern      Manuals    continue to monitor LS  Neuro Re-Ed             Education and Progression 10 min 10 Min 5 min 5 min  6 min 5 min 5 min 5 min 5 min 5 min    SLS EO>>EC  20" x 4 20"x5 20"x5 20" x 5 20" x 5 20"x5 20" x 5 EC  20"x4 EC 30"x4   SLS EO Foam  20" x 4 20"x4  20"x5 20" x 5 20" x 5 20"x5 20" x 5 20"x5 20"x4                NBOS EC      1 min Foam 1 min foam 1 min 1 min 1 min                              Ther Ex             Repeated DF unloaded NB 3 x 10 3x10          Repeated Semi-loaded DF D B>>A 4 x 10 D B    3x10  3x10 3 x 10 3 x 10 3x10 3 x 10 3x10 3x10   Progression ? LP + HR      75# nv? 65# 2x10  65# 2 x 10 65# 2x10 65# 2x10   Hip ABD/ext          2x10    gastroc ST with wedge?  20" x 4 20"x4 20"x4 20" x 4 20" x 4 20"x4 20" x 4 20"x4 20"x4                Ther Activity             PT DF OP A Nil today              SL HR B DL HR, 2 x 10 P, R heal  DL 2x10 DL 2x10 DL 3 x 10 DL 3 x 10 DL 3x10 DL 3x10, SL x 10 DL 3x10, SL x 10 DL 3x10  SL  2x10   Ambulation  B B B          Elevations     FF FF FF FF FF FF   minisquat   2x10 2x10 2 x 10 2 x 10 2x10 2 x 10 2x10 2x10   Modalities             CP to R ankle PRN  dec    CP x 10 min decline  10 min  10 min

## 2022-09-28 ENCOUNTER — APPOINTMENT (OUTPATIENT)
Dept: PHYSICAL THERAPY | Facility: CLINIC | Age: 52
End: 2022-09-28
Payer: COMMERCIAL

## 2022-09-29 ENCOUNTER — OFFICE VISIT (OUTPATIENT)
Dept: PHYSICAL THERAPY | Facility: CLINIC | Age: 52
End: 2022-09-29
Payer: COMMERCIAL

## 2022-09-29 DIAGNOSIS — M79.671 RIGHT FOOT PAIN: Primary | ICD-10-CM

## 2022-09-29 PROCEDURE — 97110 THERAPEUTIC EXERCISES: CPT

## 2022-09-29 PROCEDURE — 97530 THERAPEUTIC ACTIVITIES: CPT

## 2022-09-29 PROCEDURE — 97112 NEUROMUSCULAR REEDUCATION: CPT

## 2022-09-29 NOTE — PROGRESS NOTES
Daily Note     Today's date: 2022  Patient name: Kevin Herrera  : 1970  MRN: [id number]  Referring provider: Marybeth Thornton DO  Dx:   Encounter Diagnosis     ICD-10-CM    1  Right foot pain  M79 671        Start Time: 1100  Stop Time: 1140  Total time in clinic (min): 40 minutes    Subjective: Pt reports that she is feeling much better recently  Objective: See treatment diary below    Assessment: Tolerated treatment well  Some discomfort with SLS that resolves with repeated semiloaded DF  Overall progressing well  Patient demonstrated fatigue post treatment      Plan: Continue per plan of care  Precautions: Hyperthyroidism, Anemia Hx and Fall Concern      Manuals    continue to monitor LS  Neuro Re-Ed             Education and Progression   5 min 5 min  6 min 5 min 5 min 5 min 5 min 5 min    SLS EO>>EC EC 20"x4   20"x5 20"x5 20" x 5 20" x 5 20"x5 20" x 5 EC  20"x4 EC 30"x4   SLS EO Foam 30"x3  20"x4  20"x5 20" x 5 20" x 5 20"x5 20" x 5 20"x5 20"x4                NBOS EC 1 min     1 min Foam 1 min foam 1 min 1 min 1 min                              Ther Ex             Repeated DF unloaded   3x10          Repeated Semi-loaded DF 3x10   3x10  3x10 3 x 10 3 x 10 3x10 3 x 10 3x10 3x10   Progression                          LP + HR 65# 3x10      75# nv? 65# 2x10  65# 2 x 10 65# 2x10 65# 2x10   Hip ABD/ext YTB 2x10          2x10    gastroc ST with wedge?  20"x4  20"x4 20"x4 20" x 4 20" x 4 20"x4 20" x 4 20"x4 20"x4                Ther Activity             PT DF OP             SL HR DL 3x10   SL 2x10  DL 2x10 DL 2x10 DL 3 x 10 DL 3 x 10 DL 3x10 DL 3x10, SL x 10 DL 3x10, SL x 10 DL 3x10  SL  2x10   Ambulation    B          Elevations FF    FF FF FF FF FF FF   minisquat 2x10  2x10 2x10 2 x 10 2 x 10 2x10 2 x 10 2x10 2x10   Modalities             CP to R ankle PRN 10 min     CP x 10 min decline  10 min  10 min

## 2022-10-03 ENCOUNTER — OFFICE VISIT (OUTPATIENT)
Dept: PHYSICAL THERAPY | Facility: CLINIC | Age: 52
End: 2022-10-03
Payer: COMMERCIAL

## 2022-10-03 DIAGNOSIS — M79.671 RIGHT FOOT PAIN: Primary | ICD-10-CM

## 2022-10-03 PROCEDURE — 97110 THERAPEUTIC EXERCISES: CPT | Performed by: PHYSICAL THERAPIST

## 2022-10-03 PROCEDURE — 97112 NEUROMUSCULAR REEDUCATION: CPT | Performed by: PHYSICAL THERAPIST

## 2022-10-03 NOTE — PROGRESS NOTES
Daily Note     Today's date: 10/3/2022  Patient name: Harpal Vidal  : 1970  MRN: [id number]  Referring provider: Birgit Lieberman DO  Dx:   Encounter Diagnosis     ICD-10-CM    1  Right foot pain  M79 671                   Subjective: Pt presents today stating that she is feeling fairly well  Did yard work was sore for 2 days, but no pain  Objective: See treatment diary below    Assessment:  Able to proceed with reps and resistance with mild fatigue, rest offered assistance  RE n v        Plan: Continue per plan of care  Precautions: Hyperthyroidism, Anemia Hx and Fall Concern      Manuals 09/29 10/3 08/30 09/01 9/7 9/9 09/14 9/19 09/21 09/26   continue to monitor LS  Neuro Re-Ed             Education and Progression   5 min 5 min  6 min 5 min 5 min 5 min 5 min 5 min    SLS EO>>EC EC 20"x4  EC 20"x 4 20"x5 20"x5 20" x 5 20" x 5 20"x5 20" x 5 EC  20"x4 EC 30"x4   SLS EO Foam 30"x3 30" x 4 20"x4  20"x5 20" x 5 20" x 5 20"x5 20" x 5 20"x5 20"x4                NBOS EC 1 min 1 min    1 min Foam 1 min foam 1 min 1 min 1 min                              Ther Ex             Repeated DF unloaded   3x10          Repeated Semi-loaded DF 3x10  3 x 10 3x10  3x10 3 x 10 3 x 10 3x10 3 x 10 3x10 3x10   Progression                          LP + HR 65# 3x10  70# 3 x 10    75# nv? 65# 2x10  65# 2 x 10 65# 2x10 65# 2x10   Hip ABD/ext YTB 2x10  YTB 3 x 10        2x10    gastroc ST with wedge?  20"x4 20" x 4 20"x4 20"x4 20" x 4 20" x 4 20"x4 20" x 4 20"x4 20"x4                Ther Activity             PT DF OP             SL HR DL 3x10   SL 2x10 DL 3 x 10, SL HR 2 x 10  DL 2x10 DL 2x10 DL 3 x 10 DL 3 x 10 DL 3x10 DL 3x10, SL x 10 DL 3x10, SL x 10 DL 3x10  SL  2x10   Ambulation    B          Elevations FF FF   FF FF FF FF FF FF   minisquat 2x10 2 x 10 2x10 2x10 2 x 10 2 x 10 2x10 2 x 10 2x10 2x10   Modalities             CP to R ankle PRN 10 min dec    CP x 10 min decline  10 min  10 min

## 2022-10-05 ENCOUNTER — EVALUATION (OUTPATIENT)
Dept: PHYSICAL THERAPY | Facility: CLINIC | Age: 52
End: 2022-10-05
Payer: COMMERCIAL

## 2022-10-05 DIAGNOSIS — M79.671 RIGHT FOOT PAIN: Primary | ICD-10-CM

## 2022-10-05 PROCEDURE — 97112 NEUROMUSCULAR REEDUCATION: CPT | Performed by: PHYSICAL THERAPIST

## 2022-10-05 PROCEDURE — 97110 THERAPEUTIC EXERCISES: CPT | Performed by: PHYSICAL THERAPIST

## 2022-10-05 NOTE — PROGRESS NOTES
PT Evaluation     Today's date: 10/5/2022  Patient name: Héctor Sosa  : 1970  MRN: [id number]  Referring provider: Julio Cesar Ramirez DO  Dx:   Encounter Diagnosis     ICD-10-CM    1  Right foot pain  M79 671                   Assessment  Assessment details: Pt is progressing quite well at this time  They have demonstrated further improvement in pain level, strength, range, flexibility, tolerance to activity as well as endurance with mobility and ability to perform tasks within her home and yard  They have achieved several LTGs sought out for them and will benefit a follow up in 2-3 weeks to make sure HEP is continuing to be successful and the reduction of symptoms continue to proceed in the positive fashion it has over the last 6 weeks  Thank you very much for this kind and motivated referral       Impairments: abnormal gait, abnormal or restricted ROM, activity intolerance, impaired balance, impaired physical strength, lacks appropriate home exercise program, pain with function, poor posture  and poor body mechanics  Understanding of Dx/Px/POC: good   Prognosis: good    Goals  STG 4 Weeks:  Decrease pain at worst to 4 -met  Improve range to improve DF to 5, Gtoe to 60 -met  Improve strength to improve R foot to 4/5 or better  -met    Independent with HEP -met  LTG 8 Weeks:  Decrease pain at worst to 2 -met  Improve range to DF to 10, GToe to 70 -met  Improve strength to R foot 5/5  SLR HR 10 pain free   -met  Able to perform all desired activities with minimal to nil symptom exacerbation -will follow up  Plan  Plan details:  Will follow up on 10/31/22  Patient would benefit from: skilled physical therapy  Planned modality interventions: cryotherapy, TENS and thermotherapy: hydrocollator packs  Planned therapy interventions: activity modification, manual therapy, neuromuscular re-education, patient education, postural training, strengthening, stretching, therapeutic activities, therapeutic exercise, therapeutic training, transfer training, home exercise program, graded motor, graded exercise, graded activity, gait training, functional ROM exercises, flexibility, abdominal trunk stabilization, balance, behavior modification and body mechanics training  Frequency: 2x week  Duration in weeks: 10  Treatment plan discussed with: patient        Subjective Evaluation    History of Present Illness  Date of onset: 2022  Mechanism of injury: Pt presents today stating that she is feeling well  She states that performing all of her yard work, house work and her overall endurance is far greater  Pt reports that pain at worst is very in frequent, perhaps 2/10 at worst   This occurs with standing for several hours, and if she does feel her symptoms, she will perform her HEP with success  Pt reports that she would like to follow up in 2-3 weeks to be able to see if her ability to perform exercises independently are successful without need for assistance      Quality of life: good    Pain  Current pain ratin  At best pain ratin  At worst pain ratin  Quality: dull ache, tight and sharp  Relieving factors: ice, heat, rest and change in position  Aggravating factors: standing, walking, stair climbing and lifting  Progression: worsening      Diagnostic Tests  X-ray: normal  Treatments  Previous treatment: medication  Patient Goals  Patient goals for therapy: increased strength, return to sport/leisure activities, increased motion, improved balance and decreased pain          Objective     Active Range of Motion   Left Ankle/Foot   Dorsiflexion (ke): 10 degrees   Plantar flexion: 60 degrees   Great toe extension: 70 degrees     Right Ankle/Foot   Dorsiflexion (ke): 10 degrees   Plantar flexion: 60 degrees   Great toe extension: 70 degrees     Additional Active Range of Motion Details  Forward head, rounded shoulders, Lordosis  Genu Valgum R >L antalgia with R IC and swing phase  B/L Sensation intact to L3,4,5,S1,S2  DTR Patella 2+ B Seven B 1+   Postural correction R achilles pain and foot  L foot pain  Repeated motion /  LS ROM  Flex min   Ext min  SB R Min  SB L Min  LE Strength  Hip   L Flex 4 Ext 5 Abd 4 Add 5  R Flex 4 Ext 5 Abd 4 Add 5  LE Screen  Knee Strength  L  5/5 flex ext  R5/5 flex/ ext  Ankle Screen:  L  DF 5/5 Gtoe SL HR 5  R DF 5/5 Gtoe 5 SL HR 10   Joint Mobility  Palpation  Sts    LE Repeated Movements    Repeated DF unloaded  N B  Semi-loaded DF D B> A  Pain with DF OP R   // B>>A (remains)   SL HR D B  (remains)                 Precautions: Hyperthyroidism, Anemia Hx and Fall Concern      Manuals 09/29 10/3 10/5 09/01 9/7 9/9 09/14 9/19 09/21 09/26   continue to monitor LS  Neuro Re-Ed             Education and Progression   assessment x 10 mins 5 min  6 min 5 min 5 min 5 min 5 min 5 min    SLS EO>>EC EC 20"x4  EC 20"x 4  20"x5 20" x 5 20" x 5 20"x5 20" x 5 EC  20"x4 EC 30"x4   SLS EO Foam 30"x3 30" x 4  20"x5 20" x 5 20" x 5 20"x5 20" x 5 20"x5 20"x4                NBOS EC 1 min 1 min    1 min Foam 1 min foam 1 min 1 min 1 min                              Ther Ex             Repeated DF unloaded             Repeated Semi-loaded DF 3x10  3 x 10 reviewed to perform 2-4x's a day  3x10 3 x 10 3 x 10 3x10 3 x 10 3x10 3x10   Progression                          LP + HR 65# 3x10  70# 3 x 10    75# nv? 65# 2x10  65# 2 x 10 65# 2x10 65# 2x10   Hip ABD/ext YTB 2x10  YTB 3 x 10        2x10    gastroc ST with wedge?  20"x4 20" x 4  20"x4 20" x 4 20" x 4 20"x4 20" x 4 20"x4 20"x4                Ther Activity             PT DF OP             SL HR DL 3x10   SL 2x10 DL 3 x 10, SL HR 2 x 10  DL 2x10 DL 2x10 DL 3 x 10 DL 3 x 10 DL 3x10 DL 3x10, SL x 10 DL 3x10, SL x 10 DL 3x10  SL  2x10   Ambulation    B          Elevations FF FF Alt up and down  FF FF FF FF FF FF   minisquat 2x10 2 x 10  2x10 2 x 10 2 x 10 2x10 2 x 10 2x10 2x10   Modalities CP to R ankle PRN 10 min dec dec   CP x 10 min decline  10 min  10 min

## 2022-10-11 PROBLEM — N30.01 ACUTE CYSTITIS WITH HEMATURIA: Status: RESOLVED | Noted: 2022-06-20 | Resolved: 2022-10-11

## 2022-10-31 ENCOUNTER — EVALUATION (OUTPATIENT)
Dept: PHYSICAL THERAPY | Facility: CLINIC | Age: 52
End: 2022-10-31

## 2022-10-31 DIAGNOSIS — M79.671 RIGHT FOOT PAIN: Primary | ICD-10-CM

## 2022-10-31 NOTE — PROGRESS NOTES
Daily Note     Today's date: 10/31/2022  Patient name: Chaz Harris  : 1970  MRN: [id number]  Referring provider: Jose Luis Carr DO  Dx:   Encounter Diagnosis     ICD-10-CM    1  Right foot pain  M79 671                   Subjective: Pt presents today for the first time since 10/5/22 stating that she has been feeling really well lately  She states that she has been busy with yard work  She has been very busy with her house  She has been minimally with any pain for 2 weeks  She states that in a matter of a 1-2 days, she was walking 3 miles, shopping, deep house cleaning and yard work and was very sore  This was about a week  But she reports she has been feeling much better 1-2/10 in the last few days  No issues with sleeping, overall doing really well  Pt reports that she would like if possible to continue coming for a short span just to be monitored with her current LE presentation  Objective: See treatment diary below    Active Range of Motion   Left Ankle/Foot   Dorsiflexion (ke): 10 degrees   Plantar flexion: 60 degrees   Great toe extension: 70 degrees     Right Ankle/Foot   Dorsiflexion (ke): 10 degrees   Plantar flexion: 60 degrees   Great toe extension: 70 degrees     Additional Active Range of Motion Details  Forward head, rounded shoulders, Lordosis  Genu Valgum R >L antalgia with R IC and swing phase (mild)  B/L Sensation intact to L3,4,5,S1,S2  DTR Patella 2+ B Seven B 1+   Postural correction R achilles pain and foot  L foot pain  Repeated motion /  LS ROM  Flex min   Ext min  SB R Min  SB L Min  LE Strength  Hip   L Flex 4 Ext 5 Abd 5 Add 5  R Flex 4 Ext 5 Abd 5 Add 5  LE Screen  Knee Strength  L  5/5 flex ext  R5/5 flex/ ext  Ankle Screen:  L  DF 5/5 Gtoe 5/5 SL HR 7  R DF 5/5 Gtoe 5/5 SL HR 10   Joint Mobility  Palpation  Sts    LE Repeated Movements    Repeated DF unloaded  N B  Semi-loaded DF D B> A  Pain with DF OP R   // B>>A (remains)   SL HR D B  (remains)      Assessment: Pt educated about progressing at 1x a week for 6 weeks with RE at week 4 to make sure she continues to progress and does not have a set back in any form  Educated about posture for sitting and lifting mechanics and how to counter balance Low back presentation with EIS  Pt in complete accord  Otherwise her exacerbation in symptoms were likely significant increase in presentation and she is still recovering, however she was able to reduce her presentation  Precautions: Hyperthyroidism, Anemia Hx and Fall Concern      Manuals 09/29 10/3 10/5 10/31 9/7 9/9 09/14 9/19 09/21 09/26   continue to monitor LS  Neuro Re-Ed             Education and Progression   assessment x 10 mins Assessment/Education x 10 + Postural awareness  (seated education)  6 min 5 min 5 min 5 min 5 min 5 min    SLS EO>>EC EC 20"x4  EC 20"x 4  20" x 4 20" x 5 20" x 5 20"x5 20" x 5 EC  20"x4 EC 30"x4   SLS EO Foam 30"x3 30" x 4  nv 20" x 5 20" x 5 20"x5 20" x 5 20"x5 20"x4                NBOS EC 1 min 1 min  nv  1 min Foam 1 min foam 1 min 1 min 1 min                              Ther Ex             Repeated DF unloaded             Repeated Semi-loaded DF 3x10  3 x 10 reviewed to perform 2-4x's a day  3 x 10 B/L 3 x 10 3 x 10 3x10 3 x 10 3x10 3x10   Progression                          LP + HR 65# 3x10  70# 3 x 10  70# nv  75# nv? 65# 2x10  65# 2 x 10 65# 2x10 65# 2x10   Hip ABD/ext YTB 2x10  YTB 3 x 10  YTB nv      2x10    gastroc ST with wedge? 20"x4 20" x 4  20" x 4 20" x 4 20" x 4 20"x4 20" x 4 20"x4 20"x4   DEIDRE    Educated in tandem with lifting mechanics    2 x 5         Ther Activity             PT DF OP             SL HR DL 3x10   SL 2x10 DL 3 x 10, SL HR 2 x 10  DL 2x10 DL 2 x 10 DL 3 x 10 DL 3 x 10 DL 3x10 DL 3x10, SL x 10 DL 3x10, SL x 10 DL 3x10  SL  2x10   Ambulation    B          Elevations FF FF Alt up and down FF FF FF FF FF FF FF   minisquat 2x10 2 x 10  2 x 10 2 x 10 2 x 10 2x10 2 x 10 2x10 2x10   Modalities             CP to R ankle PRN 10 min dec dec dec  CP x 10 min decline  10 min  10 min

## 2022-11-07 ENCOUNTER — OFFICE VISIT (OUTPATIENT)
Dept: PHYSICAL THERAPY | Facility: CLINIC | Age: 52
End: 2022-11-07

## 2022-11-07 DIAGNOSIS — M79.671 RIGHT FOOT PAIN: Primary | ICD-10-CM

## 2022-11-07 NOTE — PROGRESS NOTES
Daily Note     Today's date: 2022  Patient name: Clayton Wilson  : 1970  MRN: [id number]  Referring provider: Gunnar Melo DO  Dx:   Encounter Diagnosis     ICD-10-CM    1  Right foot pain  M79 671                   Subjective: Able to blow/rake leaves for an hour today with minimal (2-3/10) pain  Objective: See treatment diary below      Assessment: Tolerated treatment fair  Significant fatigue and onset of increased pain with balance exercises  Relieved with application of ice  Patient demonstrated fatigue post treatment and would benefit from continued PT      Plan: Continue per plan of care  Precautions: Hyperthyroidism, Anemia Hx and Fall Concern      Manuals 09/29 10/3 10/5 11/7  9/9 09/14 9/19 09/21 09/26   continue to monitor LS  Neuro Re-Ed             Education and Progression   assessment x 10 mins    5 min 5 min 5 min 5 min 5 min    SLS EO>>EC EC 20"x4  EC 20"x 4  EC  20"x3 pain!  20" x 5 20"x5 20" x 5 EC  20"x4 EC 30"x4   SLS EO Foam 30"x3 30" x 4  30"x4  20" x 5 20"x5 20" x 5 20"x5 20"x4                NBOS EC 1 min 1 min  1 min  1 min Foam 1 min foam 1 min 1 min 1 min                              Ther Ex             Repeated DF unloaded             Repeated Semi-loaded DF 3x10  3 x 10 reviewed to perform 2-4x's a day  3x10  3 x 10 3x10 3 x 10 3x10 3x10   Progression                          LP + HR 65# 3x10  70# 3 x 10  70#  3x10 ea  75# nv? 65# 2x10  65# 2 x 10 65# 2x10 65# 2x10   Hip ABD/ext YTB 2x10  YTB 3 x 10  YTB  3x10 ea  2x10    gastroc ST with wedge?  20"x4 20" x 4  20"x4  20" x 4 20"x4 20" x 4 20"x4 20"x4                Ther Activity             PT DF OP             SL HR DL 3x10   SL 2x10 DL 3 x 10, SL HR 2 x 10  DL 2x10 DL 3x10    SL  2x10  DL 3 x 10 DL 3x10 DL 3x10, SL x 10 DL 3x10, SL x 10 DL 3x10  SL  2x10   Ambulation    B          Elevations FF FF Alt up and down FF  x2  FF FF FF FF FF minisquat 2x10 2 x 10  2x10  2 x 10 2x10 2 x 10 2x10 2x10   Modalities    11/7         CP to R ankle PRN 10 min dec dec 10 min  CP x 10 min decline  10 min  10 min

## 2022-11-14 ENCOUNTER — OFFICE VISIT (OUTPATIENT)
Dept: PHYSICAL THERAPY | Facility: CLINIC | Age: 52
End: 2022-11-14

## 2022-11-14 DIAGNOSIS — M79.671 RIGHT FOOT PAIN: Primary | ICD-10-CM

## 2022-11-14 NOTE — PROGRESS NOTES
Daily Note     Today's date: 2022  Patient name: Kirby Russ  : 1970  MRN: [id number]  Referring provider: Aleks Silverio DO  Dx:   Encounter Diagnosis     ICD-10-CM    1  Right foot pain  M79 671        Start Time: 1600  Stop Time: 1630  Total time in clinic (min): 30 minutes    Subjective:  Pt reports that she can tolerate a 1 mile walk around her neighborhood but has difficulty doing yard work on the grass  Objective: See treatment diary below      Assessment: Tolerated treatment well  Requiring rest breaks throughout due to fatigue but no increase in pain  Patient demonstrated fatigue post treatment and would benefit from continued PT      Plan: Continue per plan of care  Precautions: Hyperthyroidism, Anemia Hx and Fall Concern      Manuals 09/29 10/3 10/5 11/7 11/14  09/14 9/19 09/21 09/26   continue to monitor LS  Neuro Re-Ed             Education and Progression   assessment x 10 mins     5 min 5 min 5 min 5 min    SLS EO>>EC EC 20"x4  EC 20"x 4  EC  20"x3 pain! 20"x3 EC  20"x5 20" x 5 EC  20"x4 EC 30"x4   SLS EO Foam 30"x3 30" x 4  30"x4 30"x4   20"x5 20" x 5 20"x5 20"x4                NBOS EC 1 min 1 min  1 min   1 min foam 1 min 1 min 1 min                              Ther Ex             Repeated DF unloaded             Repeated Semi-loaded DF 3x10  3 x 10 reviewed to perform 2-4x's a day  3x10 3x10  3x10 3 x 10 3x10 3x10   Progression                          LP + HR 65# 3x10  70# 3 x 10  70#  3x10 ea  70# 3x10  65# 2x10  65# 2 x 10 65# 2x10 65# 2x10   Hip ABD/ext YTB 2x10  YTB 3 x 10  YTB  3x10 ea  YTB 3x10     2x10    gastroc ST with wedge?  20"x4 20" x 4  20"x4 20"x4  20"x4 20" x 4 20"x4 20"x4                Ther Activity             PT DF OP             SL HR DL 3x10   SL 2x10 DL 3 x 10, SL HR 2 x 10  DL 2x10 DL 3x10    SL  2x10 DL 3x10    SL 2x10  DL 3x10 DL 3x10, SL x 10 DL 3x10, SL x 10 DL 3x10  SL  2x10 Ambulation    B          Elevations FF FF Alt up and down FF  x2 FF  FF FF FF FF   minisquat 2x10 2 x 10  2x10 2x10  2x10 2 x 10 2x10 2x10   Modalities    11/7         CP to R ankle PRN 10 min dec dec 10 min 10 min  decline  10 min  10 min

## 2022-11-17 ENCOUNTER — OFFICE VISIT (OUTPATIENT)
Dept: FAMILY MEDICINE CLINIC | Facility: CLINIC | Age: 52
End: 2022-11-17

## 2022-11-17 VITALS
BODY MASS INDEX: 25.44 KG/M2 | RESPIRATION RATE: 21 BRPM | HEART RATE: 84 BPM | OXYGEN SATURATION: 98 % | SYSTOLIC BLOOD PRESSURE: 114 MMHG | HEIGHT: 64 IN | WEIGHT: 149 LBS | TEMPERATURE: 98.5 F | DIASTOLIC BLOOD PRESSURE: 74 MMHG

## 2022-11-17 DIAGNOSIS — G89.29 CHRONIC LEFT SHOULDER PAIN: ICD-10-CM

## 2022-11-17 DIAGNOSIS — M79.641 PAIN IN BOTH HANDS: ICD-10-CM

## 2022-11-17 DIAGNOSIS — M79.642 PAIN IN BOTH HANDS: ICD-10-CM

## 2022-11-17 DIAGNOSIS — M25.512 CHRONIC LEFT SHOULDER PAIN: ICD-10-CM

## 2022-11-17 DIAGNOSIS — L25.9 CONTACT DERMATITIS AND ECZEMA: Primary | ICD-10-CM

## 2022-11-17 PROBLEM — L23.89 ALLERGIC CONTACT DERMATITIS DUE TO OTHER AGENTS: Status: ACTIVE | Noted: 2022-11-17

## 2022-11-17 RX ORDER — CLOBETASOL PROPIONATE 0.5 MG/G
CREAM TOPICAL 2 TIMES DAILY
Qty: 30 G | Refills: 0 | Status: SHIPPED | OUTPATIENT
Start: 2022-11-17

## 2022-11-17 NOTE — PROGRESS NOTES
Name: Raul Albany      : 1970      MRN: [id number]  Encounter Provider: Hany Suazo MD  Encounter Date: 2022   Encounter department: [address] [place] [address]  Contact dermatitis and eczema  Assessment & Plan:  Rash on the pinna likely secondary to contact dermatitis of dye  No signs of secondary infection  Patient also has retroauricular reactive lymphadenopathy Advised to use of clobetasol twice daily for 5 days and follow-up  Advised to follow up sooner if the rash is becoming worse, there is pus like discharge or if new symptoms develop  Orders:  -     clobetasol (TEMOVATE) 0 05 % cream; Apply topically 2 (two) times a day    2  Pain in both hands  Assessment & Plan:  Likely secondary to osteoarthritis  Will trial physical therapy, referral given  Orders:  -     Ambulatory Referral to Physical Therapy; Future    3  Chronic left shoulder pain  -     Ambulatory Referral to Physical Therapy; Future         Subjective      Shekhar presented to office with complaints of rash on her right ear with pain behind the ear  She denies any discharge from inside of the ear, but there is discharge from the rash  She reports she used diet 2 weeks ago, and that probably led to the rash  She also has contact dermatitis on her hands for which she uses clobetasol  Started to use clobetasol on the rash yesterday  She is also requesting referral to physical therapy for pain in her hands and left shoulder  Review of Systems   Constitutional: Negative for chills and fever  HENT: Negative for congestion  Respiratory: Negative for shortness of breath  Cardiovascular: Negative for chest pain  Gastrointestinal: Negative for diarrhea, nausea and vomiting  Genitourinary: Negative for difficulty urinating  Musculoskeletal: Positive for arthralgias  Skin: Positive for rash  Neurological: Negative for headaches         Current Outpatient Medications on File Prior to Visit   Medication Sig   • cholecalciferol (VITAMIN D3) 1,000 units tablet Take 1 tablet (1,000 Units total) by mouth daily   • ciprofloxacin-dexamethasone (CIPRODEX) otic suspension Administer 4 drops to the right ear 2 (two) times a day   • docusate sodium (COLACE) 100 mg capsule Take 1 capsule by mouth 2 (two) times a day as needed   • escitalopram (LEXAPRO) 5 mg tablet Take 1 tablet (5 mg total) by mouth daily   • hydrocortisone 2 5 % cream Apply topically 2 (two) times a day   • levothyroxine (Euthyrox) 137 mcg tablet Take 1 tablet (137 mcg total) by mouth daily   • loratadine (CLARITIN) 10 mg tablet Take 1 tablet (10 mg total) by mouth daily (Patient taking differently: Take 10 mg by mouth daily PRN)   • Multiple Vitamin (MULTIVITAMIN) capsule Take 1 capsule by mouth daily   • nystatin (MYCOSTATIN) cream Apply topically 2 (two) times a day   • polyethylene glycol (GLYCOLAX) 17 GM/SCOOP powder Take 17 g by mouth daily PRN   • psyllium (METAMUCIL SMOOTH TEXTURE) 28 % packet Take 1 packet by mouth 2 (two) times a day   • sodium chloride (OCEAN) 0 65 % nasal spray 1 spray into each nostril as needed for congestion or rhinitis (Patient taking differently: 1 spray into each nostril as needed for congestion or rhinitis PRN)   • triamcinolone (KENALOG) 0 1 % ointment Apply topically 2 (two) times a day   • [DISCONTINUED] clobetasol (TEMOVATE) 0 05 % cream Apply topically 2 (two) times a day       Objective     /74 (BP Location: Left arm, Patient Position: Sitting, Cuff Size: Standard)   Pulse 84   Temp 98 5 °F (36 9 °C) (Temporal)   Resp 21   Ht 5' 4" (1 626 m)   Wt 67 6 kg (149 lb)   LMP 08/15/2020 (Approximate)   SpO2 98%   BMI 25 58 kg/m²     Physical Exam  Vitals reviewed  Constitutional:       Appearance: She is well-developed and well-nourished  HENT:      Head: Normocephalic and atraumatic        Right Ear: External ear normal       Left Ear: External ear normal  Mouth/Throat:      Mouth: Oropharynx is clear and moist    Eyes:      Extraocular Movements: EOM normal       Conjunctiva/sclera: Conjunctivae normal    Cardiovascular:      Rate and Rhythm: Normal rate and regular rhythm  Pulses: Intact distal pulses  Heart sounds: Normal heart sounds  No murmur heard  No friction rub  Pulmonary:      Effort: Pulmonary effort is normal  No respiratory distress  Breath sounds: Normal breath sounds  No wheezing or rales  Musculoskeletal:         General: Normal range of motion  Cervical back: Normal range of motion and neck supple  Skin:     General: Skin is warm and dry  Comments: Scaly rash on Pinna with erythema  See picture in media  Rash also seen on left index finger   Neurological:      Mental Status: She is alert and oriented to person, place, and time         Edwin Pearce MD

## 2022-11-17 NOTE — ASSESSMENT & PLAN NOTE
Rash on the pinna likely secondary to contact dermatitis of dye  No signs of secondary infection  Patient also has retroauricular reactive lymphadenopathy Advised to use of clobetasol twice daily for 5 days and follow-up  Advised to follow up sooner if the rash is becoming worse, there is pus like discharge or if new symptoms develop

## 2022-11-21 ENCOUNTER — OFFICE VISIT (OUTPATIENT)
Dept: PHYSICAL THERAPY | Facility: CLINIC | Age: 52
End: 2022-11-21

## 2022-11-21 ENCOUNTER — OFFICE VISIT (OUTPATIENT)
Dept: FAMILY MEDICINE CLINIC | Facility: CLINIC | Age: 52
End: 2022-11-21

## 2022-11-21 VITALS
BODY MASS INDEX: 25.47 KG/M2 | OXYGEN SATURATION: 98 % | RESPIRATION RATE: 18 BRPM | HEIGHT: 64 IN | WEIGHT: 149.2 LBS | HEART RATE: 76 BPM | SYSTOLIC BLOOD PRESSURE: 126 MMHG | TEMPERATURE: 97.3 F | DIASTOLIC BLOOD PRESSURE: 84 MMHG

## 2022-11-21 DIAGNOSIS — M79.671 RIGHT FOOT PAIN: Primary | ICD-10-CM

## 2022-11-21 DIAGNOSIS — L30.9 ECZEMA, UNSPECIFIED TYPE: Primary | ICD-10-CM

## 2022-11-21 DIAGNOSIS — L25.9 CONTACT DERMATITIS AND ECZEMA: ICD-10-CM

## 2022-11-21 DIAGNOSIS — J30.9 ALLERGIC RHINITIS, UNSPECIFIED SEASONALITY, UNSPECIFIED TRIGGER: ICD-10-CM

## 2022-11-21 DIAGNOSIS — Z23 ENCOUNTER FOR IMMUNIZATION: ICD-10-CM

## 2022-11-21 NOTE — PROGRESS NOTES
Name: Gee Browne      : 1970      MRN: [id number]  Encounter Provider: Lexis Eugene MD  Encounter Date: 2022   Encounter department: [address] [place] [address]     1  Eczema, unspecified type  Assessment & Plan:  Continue Kenalog cream daily, may use clobetasol if the rash worsens, recommended use of thick emollients on skin      2  Contact dermatitis and eczema  Assessment & Plan:  Rash on the pinna much improved today with the use of clobetasol  Associated lymphadenopathy has also reduced  Patient has new rash on her wrist, back likely eczematous  Advised to use Kenalog cream   She also reports allergies to various foods pain, chronic nasal congestion  Provided referral for allergist       3  Allergic rhinitis, unspecified seasonality, unspecified trigger  Assessment & Plan:  Continue use of Flonase, humidifier  Will refer to allergist as well  Orders:  -     Ambulatory Referral to Allergy; Future    4  Encounter for immunization  -     influenza vaccine, quadrivalent, recombinant, PF, 0 5 mL, for patients 18 yr+ (FLUBLOK)         Toshia Corrigan presented to office for follow-up visit from last week  Today her rash on ear and finger has much improved with use of clobetasol  She has new lesions in her left wrist, back  Patient is not sure what caused the lesions  She has been working in the yard recently  She denies any changes in the detergents, cosmetics other than working in the backyard  She also reports allergies to various foods, dye and she would like to get allergy testing  She also has chronic nasal congestion    Review of Systems   Constitutional: Negative for chills and fever  HENT: Positive for congestion  Respiratory: Negative for shortness of breath  Cardiovascular: Negative for chest pain  Gastrointestinal: Negative for diarrhea, nausea and vomiting  Genitourinary: Negative for difficulty urinating     Skin: Positive for rash  Neurological: Negative for headaches  Current Outpatient Medications on File Prior to Visit   Medication Sig   • cholecalciferol (VITAMIN D3) 1,000 units tablet Take 1 tablet (1,000 Units total) by mouth daily   • ciprofloxacin-dexamethasone (CIPRODEX) otic suspension Administer 4 drops to the right ear 2 (two) times a day   • clobetasol (TEMOVATE) 0 05 % cream Apply topically 2 (two) times a day   • docusate sodium (COLACE) 100 mg capsule Take 1 capsule by mouth 2 (two) times a day as needed   • escitalopram (LEXAPRO) 5 mg tablet Take 1 tablet (5 mg total) by mouth daily   • hydrocortisone 2 5 % cream Apply topically 2 (two) times a day   • levothyroxine (Euthyrox) 137 mcg tablet Take 1 tablet (137 mcg total) by mouth daily   • loratadine (CLARITIN) 10 mg tablet Take 1 tablet (10 mg total) by mouth daily (Patient taking differently: Take 10 mg by mouth daily PRN)   • Multiple Vitamin (MULTIVITAMIN) capsule Take 1 capsule by mouth daily   • nystatin (MYCOSTATIN) cream Apply topically 2 (two) times a day   • polyethylene glycol (GLYCOLAX) 17 GM/SCOOP powder Take 17 g by mouth daily PRN   • psyllium (METAMUCIL SMOOTH TEXTURE) 28 % packet Take 1 packet by mouth 2 (two) times a day   • sodium chloride (OCEAN) 0 65 % nasal spray 1 spray into each nostril as needed for congestion or rhinitis (Patient taking differently: 1 spray into each nostril as needed for congestion or rhinitis PRN)   • triamcinolone (KENALOG) 0 1 % ointment Apply topically 2 (two) times a day       Objective     /84 (BP Location: Left arm, Patient Position: Sitting, Cuff Size: Standard)   Pulse 76   Temp (!) 97 3 °F (36 3 °C) (Temporal)   Resp 18   Ht 5' 4" (1 626 m)   Wt 67 7 kg (149 lb 3 2 oz)   LMP 08/15/2020 (Approximate)   SpO2 98%   BMI 25 61 kg/m²     Physical Exam  Constitutional:       Appearance: She is well-developed and well-nourished  HENT:      Head: Normocephalic and atraumatic        Right Ear: External ear normal       Left Ear: External ear normal       Mouth/Throat:      Mouth: Oropharynx is clear and moist    Eyes:      Extraocular Movements: EOM normal       Conjunctiva/sclera: Conjunctivae normal    Cardiovascular:      Rate and Rhythm: Normal rate and regular rhythm  Pulses: Intact distal pulses  Heart sounds: Normal heart sounds  No murmur heard  No friction rub  Pulmonary:      Effort: Pulmonary effort is normal  No respiratory distress  Breath sounds: Normal breath sounds  No wheezing or rales  Musculoskeletal:         General: Normal range of motion  Cervical back: Normal range of motion and neck supple  Skin:     General: Skin is warm and dry  Comments: Rash much improved on the pinna and finger  Erythematous scaly lesions on left wrist, back   Neurological:      Mental Status: She is alert and oriented to person, place, and time         Jorge Mcqueen MD

## 2022-11-21 NOTE — ASSESSMENT & PLAN NOTE
Rash on the pinna much improved today with the use of clobetasol  Associated lymphadenopathy has also reduced  Patient has new rash on her wrist, back likely eczematous  Advised to use Kenalog cream   She also reports allergies to various foods pain, chronic nasal congestion    Provided referral for allergist

## 2022-11-21 NOTE — PROGRESS NOTES
Daily Note     Today's date: 2022  Patient name: Selena Harris  : 1970  MRN: [id number]  Referring provider: Riley Reed DO  Dx:   Encounter Diagnosis     ICD-10-CM    1  Right foot pain  M79 671           Start Time: 1600  Stop Time: 1637  Total time in clinic (min): 37 minutes    Subjective:  Pt reports that she has been very busy and notices that when she does not have time to do her HEP, she has increased soreness  Objective: See treatment diary below      Assessment: Tolerated treatment well  Able to progress with strengthening  Patient demonstrated fatigue post treatment and would benefit from continued PT      Plan: Continue per plan of care  Precautions: Hyperthyroidism, Anemia Hx and Fall Concern      Manuals 09/29 10/3 10/5 11/7 11/14 11/21  9/19 09/21 09/26   continue to monitor LS  Neuro Re-Ed             Education and Progression   assessment x 10 mins      5 min 5 min 5 min    SLS EO>>EC EC 20"x4  EC 20"x 4  EC  20"x3 pain! 20"x3 EC 20"x4 EC  20" x 5 EC  20"x4 EC 30"x4   SLS EO Foam 30"x3 30" x 4  30"x4 30"x4  20"x4  20" x 5 20"x5 20"x4                NBOS EC 1 min 1 min  1 min    1 min 1 min 1 min                              Ther Ex             Repeated DF unloaded             Repeated Semi-loaded DF 3x10  3 x 10 reviewed to perform 2-4x's a day  3x10 3x10 3x10   3 x 10 3x10 3x10   Progression                          LP + HR 65# 3x10  70# 3 x 10  70#  3x10 ea  70# 3x10 75# 2x10  65# 2 x 10 65# 2x10 65# 2x10   Hip ABD/ext YTB 2x10  YTB 3 x 10  YTB  3x10 ea  YTB 3x10 RTB 2x10     2x10    gastroc ST with wedge?  20"x4 20" x 4  20"x4 20"x4 20"x4  20" x 4 20"x4 20"x4                Ther Activity             PT DF OP             SL HR DL 3x10   SL 2x10 DL 3 x 10, SL HR 2 x 10  DL 2x10 DL 3x10    SL  2x10 DL 3x10    SL 2x10 DL 3x10    SL 2x10  DL 3x10, SL x 10 DL 3x10, SL x 10 DL 3x10  SL  2x10   Ambulation    B Elevations FF FF Alt up and down FF  x2 FF FF  FF FF FF   minisquat 2x10 2 x 10  2x10 2x10 2x10  2 x 10 2x10 2x10   Modalities    11/7         CP to R ankle PRN 10 min dec dec 10 min 10 min 10 min    10 min  10 min

## 2022-11-28 ENCOUNTER — APPOINTMENT (OUTPATIENT)
Dept: PHYSICAL THERAPY | Facility: CLINIC | Age: 52
End: 2022-11-28

## 2022-11-30 ENCOUNTER — OFFICE VISIT (OUTPATIENT)
Dept: PHYSICAL THERAPY | Facility: CLINIC | Age: 52
End: 2022-11-30

## 2022-11-30 DIAGNOSIS — M79.671 RIGHT FOOT PAIN: Primary | ICD-10-CM

## 2022-11-30 NOTE — PROGRESS NOTES
Daily Note     Today's date: 2022  Patient name: Clayton Wilson  : 1970  MRN: [id number]  Referring provider: Gunnar Melo DO  Dx:   Encounter Diagnosis     ICD-10-CM    1  Right foot pain  M79 671                      Subjective:  Patient reports overall foot pain is improved  No pain in the morning with her first steps  Objective: See treatment diary below      Assessment: Tolerated treatment well  Patient demonstrated fatigue post treatment and would benefit from continued PT  Improved strength with heel raises  No pain with exercises  Plan: Continue per plan of care  Precautions: Hyperthyroidism, Anemia Hx and Fall Concern      Manuals 09/29 10/3 10/5 11/7 11/14 11/21 11/30 9/19 09/21 09/26   continue to monitor LS  Neuro Re-Ed             Education and Progression   assessment x 10 mins      5 min 5 min 5 min    SLS EO>>EC EC 20"x4  EC 20"x 4  EC  20"x3 pain! 20"x3 EC 20"x4 EC 20" x4  EC 20" x 5 EC  20"x4 EC 30"x4   SLS EO Foam 30"x3 30" x 4  30"x4 30"x4  20"x4 20" x4  20" x 5 20"x5 20"x4                NBOS EC 1 min 1 min  1 min    1 min 1 min 1 min                              Ther Ex             Repeated DF unloaded             Repeated Semi-loaded DF 3x10  3 x 10 reviewed to perform 2-4x's a day  3x10 3x10 3x10  3x10 3 x 10 3x10 3x10   Progression                          LP + HR 65# 3x10  70# 3 x 10  70#  3x10 ea  70# 3x10 75# 2x10 75#  2x10 65# 2 x 10 65# 2x10 65# 2x10   Hip ABD/ext YTB 2x10  YTB 3 x 10  YTB  3x10 ea  YTB 3x10 RTB 2x10  RTB 2x10   2x10    gastroc ST with wedge?  20"x4 20" x 4  20"x4 20"x4 20"x4 20" x4 20" x 4 20"x4 20"x4                Ther Activity             PT DF OP             SL HR DL 3x10   SL 2x10 DL 3 x 10, SL HR 2 x 10  DL 2x10 DL 3x10    SL  2x10 DL 3x10    SL 2x10 DL 3x10    SL 2x10 DL 3x10      SL 2x10 DL 3x10, SL x 10 DL 3x10, SL x 10 DL 3x10  SL  2x10   Ambulation    B Elevations FF FF Alt up and down FF  x2 FF FF FFx2 FF FF FF   minisquat 2x10 2 x 10  2x10 2x10 2x10 2x10 2 x 10 2x10 2x10   Modalities    11/7         CP to R ankle PRN 10 min dec dec 10 min 10 min 10 min  10 min  10 min  10 min

## 2022-12-05 ENCOUNTER — APPOINTMENT (OUTPATIENT)
Dept: PHYSICAL THERAPY | Facility: CLINIC | Age: 52
End: 2022-12-05

## 2022-12-08 ENCOUNTER — EVALUATION (OUTPATIENT)
Dept: PHYSICAL THERAPY | Facility: CLINIC | Age: 52
End: 2022-12-08

## 2022-12-08 DIAGNOSIS — G89.29 CHRONIC LEFT SHOULDER PAIN: ICD-10-CM

## 2022-12-08 DIAGNOSIS — M25.512 CHRONIC LEFT SHOULDER PAIN: ICD-10-CM

## 2022-12-08 DIAGNOSIS — M54.12 CERVICAL RADICULOPATHY: Primary | ICD-10-CM

## 2022-12-08 DIAGNOSIS — M79.671 RIGHT FOOT PAIN: ICD-10-CM

## 2022-12-08 NOTE — PROGRESS NOTES
PT Evaluation     Today's date: 2022  Patient name: Ani Johnston  : 1970  MRN: [id number]  Referring provider: Dennie Marshall, DO  Dx:   Encounter Diagnosis     ICD-10-CM    1  Cervical radiculopathy  M54 12       2  Chronic left shoulder pain  M25 512     G89 29       3  Right foot pain  M79 671                      Assessment  Assessment details: Pt presents with signs and symptoms synonymous of admitting diagnosis as well as mechanical diagnosis of posterior derangement with DP of CS retraction  Pt presents with pain, decreased strength, decreased range, flexibility, as well as tolerance to activity, Neural tension, and postural awareness  Pt would benefit skilled PT intervention in order to address these impairments in order to be able to perform all desired activities with minimal to nil symptom exacerbation  If she fails to find improvement over the next 3-4 weeks, appropriate referral will be performed however based on today's session she will likely have a strong outcome  In regards of her ankle she may continue intervention as needed however the L Shoulder and neck should be her priority at this time  Thank you very much for this kind and motivated referral        Impairments: abnormal gait, abnormal or restricted ROM, activity intolerance, impaired balance, impaired physical strength, lacks appropriate home exercise program, pain with function, poor posture  and poor body mechanics  Understanding of Dx/Px/POC: good   Prognosis: good    Goals  STG 4 Weeks:  Decrease pain at worst to 4 -met  Improve range to improve DF to 5, Gtoe to 60 -met  Improve strength to improve R foot to 4/5 or better  -met    Independent with HEP -met  LTG 8 Weeks:  Decrease pain at worst to 2 -met  Improve range to DF to 10, GToe to 70 -met  Improve strength to R foot 5/5  SLR HR 10 pain free   -met  Able to perform all desired activities with minimal to nil symptom exacerbation -will follow up     STG 4 Weeks Neck:  Decrease pain at worst to 4  Improve range to improve all planes to min CS  Improve strength to improve L UE to 4/5  Independent with HEP  LTG 8 Weeks:  Decrease pain at worst to 2  Improve range to nil CS  Improve strength to improve L UE to 5/5  Able to perform all desired activities with minimal to nil symptom exacerbation        Plan  Patient would benefit from: skilled physical therapy  Planned modality interventions: cryotherapy, TENS and thermotherapy: hydrocollator packs  Planned therapy interventions: activity modification, manual therapy, neuromuscular re-education, patient education, postural training, strengthening, stretching, therapeutic activities, therapeutic exercise, therapeutic training, transfer training, home exercise program, graded motor, graded exercise, graded activity, gait training, functional ROM exercises, flexibility, abdominal trunk stabilization, balance, behavior modification and body mechanics training  Frequency: 2x week  Duration in weeks: 10  Treatment plan discussed with: patient        Subjective Evaluation    History of Present Illness  Date of onset: 8/22/2022  Mechanism of injury: Pt is a 50 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 and familiar to this facility as she has been treated for R foot pain, presents today for an evaluation of her L shoulder pain that developed insidious onset of L shoulder pain that began about 3-6 months ago  She noticed pain initially mostly at night when she was sleeping on L side or on stomach with arms up by her head  Pt reports that primarily in the L shoulder between that and elbow  Occasionally has L thumb pain at the [facility] region, but nothing in forearm  Pt reports that she also sometimes gets symptoms in her [place] region, but this is intermittent  Pt reports that she has been doing some stretching which helps a little but but doesn't carry over  Pt reports that currently sleeping is going okay    Pt reports that she has had this issue in the past but got better on its own  Pt reports no formal history of neck pain, but she has occasional headaches usually during seasonal changing  Pt reports that she was referred by her family physician for PT evaluation of L shoulder and thus presents today  Pt recently began a new job however it does not require reaching [place], but does require at waist lifting  Sometimes it will bother her with lifting heavier items  Pt reports goals at this time are to reduce pain, improve use of the arm so that she can perform vocational demands as well as tasks around her home  In regards of her ankle it has been doing fairly well  States that increased activity will cause some issues, but overall drastically improved from when she began care     Quality of life: good    Pain  Current pain ratin  At best pain ratin  At worst pain ratin  Quality: dull ache, tight and sharp  Relieving factors: ice, heat, rest and change in position  Aggravating factors: standing, walking, stair climbing, lifting and overhead activity  Progression: no change      Diagnostic Tests  No diagnostic tests performed  Treatments  Previous treatment: medication  Patient Goals  Patient goals for therapy: increased strength, return to sport/leisure activities, increased motion, improved balance and decreased pain          Objective     Active Range of Motion   Cervical/Thoracic Spine       Cervical  Subcranial protraction:   Restriction level: minimal  Subcranial retraction:   Restriction level: moderate  Flexion:  WFL  Extension:  with pain Restriction level: moderate  Left lateral flexion:  Restriction level: moderate  Right lateral flexion:  Restriction level minimal  Left rotation:  Restriction level: moderate  Right rotation:  Restriction level: minimal  Left Ankle/Foot   Dorsiflexion (ke): 10 degrees   Plantar flexion: 60 degrees   Great toe extension: 70 degrees     Right Ankle/Foot   Dorsiflexion (ke): 10 degrees   Plantar flexion: 60 degrees   Great toe extension: 70 degrees     Additional Active Range of Motion Details  Forward head, rounded shoulders  B/L Sensation intact to light touch C(3,4 L < R),5,6,7,8,T1,T2  DTR Bi Tri Brac all 1+ B [place] (-) B  Postural correction  L shoulder/arm pain  B  UE Screen  ROM: WFL B, ERP Abd and Flex PT OP  Strength:  3+ L shoulder ER, 3+ Elbow Ext    Repeated motion   Flex  Retraction L neck and shoulder, D B  Improved shoulder ER strength, Elbow Ext, and (-) NLTT  Ext  SB R  SB L  Joint Mobility  Palpation  STs  NLTT L + 10 degrees    Forward head, rounded shoulders, Lordosis  Genu Valgum R >L antalgia with R IC and swing phase  B/L Sensation intact to L3,4,5,S1,S2  DTR Patella 2+ B [place] B 1+   Postural correction R achilles pain and foot  L foot pain  Repeated motion /  LS ROM  Flex min   Ext min  SB R Min  SB L Min  LE Strength  Hip   L Flex 4 Ext 5 Abd 4 Add 5  R Flex 4 Ext 5 Abd 4 Add 5  LE Screen  Knee Strength  L  5/5 flex ext  R5/5 flex/ ext  Ankle Screen:  L  DF 5/5 Gtoe SL HR 5  R DF 5/5 Gtoe 5 SL HR 10   Joint Mobility  Palpation  Sts    LE Repeated Movements    Repeated DF unloaded  N B  Semi-loaded DF D B> A  Pain with DF OP R   // B>>A (remains)   SL HR D B  (remains)                 Precautions: Hyperthyroidism, Anemia Hx and Fall Concern  Shoulder:        Manuals 12/8                                                                Neuro Re-Ed             Postural Ed and progression 10 min            Back account analogy performed            Cut Finger analogy performed                                                                Ther Ex             CS Retraction D B 3 x 10            Progression?                                                                               NLTT Glider L  1"           Ther Activity             Shoulder ER strength B            Elbow Ext B            NLTT L  B                                      Modalities             CP/HP prn to CS Ankle: Manuals 09/29 10/3 10/5 11/7 11/14 11/21 11/30 9/19 09/21 09/26   continue to monitor LS  Neuro Re-Ed    11/7         Education and Progression   assessment x 10 mins      5 min 5 min 5 min    SLS EO>>EC EC 20"x4  EC 20"x 4  EC  20"x3 pain! 20"x3 EC 20"x4 EC 20" x4  EC 20" x 5 EC  20"x4 EC 30"x4   SLS EO Foam 30"x3 30" x 4  30"x4 30"x4  20"x4 20" x4  20" x 5 20"x5 20"x4                NBOS EC 1 min 1 min  1 min    1 min 1 min 1 min                              Ther Ex    11/7         Repeated DF unloaded             Repeated Semi-loaded DF 3x10  3 x 10 reviewed to perform 2-4x's a day  3x10 3x10 3x10  3x10 3 x 10 3x10 3x10   Progression                          LP + HR 65# 3x10  70# 3 x 10  70#  3x10 ea  70# 3x10 75# 2x10 75#  2x10 65# 2 x 10 65# 2x10 65# 2x10   Hip ABD/ext YTB 2x10  YTB 3 x 10  YTB  3x10 ea  YTB 3x10 RTB 2x10  RTB 2x10   2x10    gastroc ST with wedge?  20"x4 20" x 4  20"x4 20"x4 20"x4 20" x4 20" x 4 20"x4 20"x4                Ther Activity    11/7         PT DF OP             SL HR DL 3x10   SL 2x10 DL 3 x 10, SL HR 2 x 10  DL 2x10 DL 3x10    SL  2x10 DL 3x10    SL 2x10 DL 3x10    SL 2x10 DL 3x10      SL 2x10 DL 3x10, SL x 10 DL 3x10, SL x 10 DL 3x10  SL  2x10   Ambulation    B          Elevations FF FF Alt up and down FF  x2 FF FF FFx2 FF FF FF   minisquat 2x10 2 x 10  2x10 2x10 2x10 2x10 2 x 10 2x10 2x10   Modalities    11/7         CP to R ankle PRN 10 min dec dec 10 min 10 min 10 min  10 min  10 min  10 min

## 2022-12-22 ENCOUNTER — APPOINTMENT (OUTPATIENT)
Dept: PHYSICAL THERAPY | Facility: CLINIC | Age: 52
End: 2022-12-22

## 2022-12-27 ENCOUNTER — OFFICE VISIT (OUTPATIENT)
Dept: PHYSICAL THERAPY | Facility: CLINIC | Age: 52
End: 2022-12-27

## 2022-12-27 DIAGNOSIS — M79.671 RIGHT FOOT PAIN: ICD-10-CM

## 2022-12-27 DIAGNOSIS — M25.512 CHRONIC LEFT SHOULDER PAIN: ICD-10-CM

## 2022-12-27 DIAGNOSIS — G89.29 CHRONIC LEFT SHOULDER PAIN: ICD-10-CM

## 2022-12-27 DIAGNOSIS — M54.12 CERVICAL RADICULOPATHY: Primary | ICD-10-CM

## 2022-12-27 NOTE — PROGRESS NOTES
Daily Note     Today's date: 2022  Patient name: Velasquez Gramajo  : 1970  MRN: [id number]  Referring provider: Noa Butterfield DO  Dx:   Encounter Diagnosis     ICD-10-CM    1  Cervical radiculopathy  M54 12       2  Chronic left shoulder pain  M25 512     G89 29       3  Right foot pain  M79 671                      Subjective: Pt reports no pain in the neck today and does not state any issues with her ankle  She has not completed interventions in the last few days due to busy weekend  She also has a new bruise on her R first toe nail and is concerned how it could have happened  Objective: See treatment diary below      Assessment: Pt complete all interventions today with need for cueing and ensuring proper form  She struggled with L arm median nerve tension interventions and should continue these or modify them to improve her tolerance to them  She wanted to add her ankle interventions back in today since she has completed these exercises in an extended period of time  No reason for concern with her bruise on first toe, possibly due to shoes  Plan: Continue per plan of care  Precautions: Hyperthyroidism, Anemia Hx and Fall Concern  Shoulder:        Manuals                                                                Neuro Re-Ed             Postural Ed and progression 10 min 10min           Back account analogy performed            Cut Finger analogy performed                                                                Ther Ex             CS Retraction D B 3 x 10 10x           Progression?                                                                               NLTT Glider L  1"           Ther Activity             Shoulder ER strength B 2x10 GTB            Elbow Ext B 2x10 GTB            NLTT L  B B 1'                                      Modalities             CP/HP prn to CS                               Ankle:    Manuals 09/29 10/3 10/5 11/7 11/14 11/21 11/30 12/27 09/26   continue to monitor LS  Neuro Re-Ed    11/7         Education and Progression   assessment x 10 mins        5 min    SLS EO>>EC EC 20"x4  EC 20"x 4  EC  20"x3 pain! 20"x3 EC 20"x4 EC 20" x4  EC 20" x4  EC  EC 30"x4   SLS EO Foam 30"x3 30" x 4  30"x4 30"x4  20"x4 20" x4  20" x4   20"x4                NBOS EC 1 min 1 min  1 min      1 min                              Ther Ex    11/7         Repeated DF unloaded             Repeated Semi-loaded DF 3x10  3 x 10 reviewed to perform 2-4x's a day  3x10 3x10 3x10  3x10 3x10  3x10   Progression                          LP + HR 65# 3x10  70# 3 x 10  70#  3x10 ea  70# 3x10 75# 2x10 75#  2x10 75#  2x10  65# 2x10   Hip ABD/ext YTB 2x10  YTB 3 x 10  YTB  3x10 ea  YTB 3x10 RTB 2x10  RTB 2x10   2x10    gastroc ST with wedge?  20"x4 20" x 4  20"x4 20"x4 20"x4 20" x4 20" x4  20"x4                Ther Activity    11/7         PT DF OP             SL HR DL 3x10   SL 2x10 DL 3 x 10, SL HR 2 x 10  DL 2x10 DL 3x10    SL  2x10 DL 3x10    SL 2x10 DL 3x10    SL 2x10 DL 3x10      SL 2x10 DL 3x10      SL 2x10  DL 3x10  SL  2x10   Ambulation    B          Elevations FF FF Alt up and down FF  x2 FF FF FFx2   FF   minisquat 2x10 2 x 10  2x10 2x10 2x10 2x10   2x10   Modalities    11/7         CP to R ankle PRN 10 min dec dec 10 min 10 min 10 min  10 min   10 min

## 2022-12-28 ENCOUNTER — APPOINTMENT (OUTPATIENT)
Dept: PHYSICAL THERAPY | Facility: CLINIC | Age: 52
End: 2022-12-28

## 2023-01-05 ENCOUNTER — APPOINTMENT (OUTPATIENT)
Dept: PHYSICAL THERAPY | Facility: CLINIC | Age: 53
End: 2023-01-05

## 2023-01-09 ENCOUNTER — EVALUATION (OUTPATIENT)
Dept: PHYSICAL THERAPY | Facility: CLINIC | Age: 53
End: 2023-01-09

## 2023-01-09 DIAGNOSIS — M25.512 CHRONIC LEFT SHOULDER PAIN: ICD-10-CM

## 2023-01-09 DIAGNOSIS — G89.29 CHRONIC LEFT SHOULDER PAIN: ICD-10-CM

## 2023-01-09 DIAGNOSIS — M79.671 RIGHT FOOT PAIN: ICD-10-CM

## 2023-01-09 DIAGNOSIS — M54.12 CERVICAL RADICULOPATHY: Primary | ICD-10-CM

## 2023-01-09 NOTE — PROGRESS NOTES
PT Evaluation     Today's date: 2023  Patient name: Edenilson Lloyd  : 1970  MRN: [id number]  Referring provider: Evi Abdi DO  Dx:   Encounter Diagnosis     ICD-10-CM    1  Cervical radiculopathy  M54 12       2  Chronic left shoulder pain  M25 512     G89 29       3  Right foot pain  M79 671                      Assessment  Assessment details: Pt at this time has achieved all goals sought out for her as well as by her in regards of her foot and her shoulder neck complex  She has improved strength, range, flexibility, tolerance to activity and is likely safe to DC to [facility]  If she is to have a decline in any form she is welcome to as needed  Thank you very much for this kind and motivated referral     Impairments: abnormal gait, abnormal or restricted ROM, activity intolerance, impaired balance, impaired physical strength, lacks appropriate home exercise program, pain with function, poor posture  and poor body mechanics  Understanding of Dx/Px/POC: good   Prognosis: good    Goals  STG 4 Weeks:  Decrease pain at worst to 4 -met  Improve range to improve DF to 5, Gtoe to 60 -met  Improve strength to improve R foot to 4/5 or better  -met    Independent with HEP -met  LTG 8 Weeks:  Decrease pain at worst to 2 -met  Improve range to DF to 10, GToe to 70 -met  Improve strength to R foot 5/5    SLR HR 10 pain free   -met  Able to perform all desired activities with minimal to nil symptom exacerbation -will follow up  -met  STG 4 Weeks Neck:  Decrease pain at worst to 4 -met  Improve range to improve all planes to min CS -met  Improve strength to improve L UE to 4/5  -met  Independent with HEP -met  LTG 8 Weeks:  Decrease pain at worst to 2 -met  Improve range to nil CS -met  Improve strength to improve L UE to 5/5   -met  Able to perform all desired activities with minimal to nil symptom exacerbation -met        Plan  Patient would benefit from: skilled physical therapy  Planned modality interventions: cryotherapy, TENS and thermotherapy: hydrocollator packs  Planned therapy interventions: activity modification, manual therapy, neuromuscular re-education, patient education, postural training, strengthening, stretching, therapeutic activities, therapeutic exercise, therapeutic training, transfer training, home exercise program, graded motor, graded exercise, graded activity, gait training, functional ROM exercises, flexibility, abdominal trunk stabilization, balance, behavior modification and body mechanics training  Frequency: 2x week  Duration in weeks: 10  Treatment plan discussed with: patient        Subjective Evaluation    History of Present Illness  Date of onset: 2022  Mechanism of injury: Pt reports that her neck and shoulder are much better  Only having symptoms mild at worst 1-2/10  Pt reports occasional symptoms after waking up otherwise exercises 2x's a day have been going really well for her  Pt reports that her foot has been also doing well  Pt reports if she sits or walks for too long the R foot will cause some irritation, but otherwise she has been feeling really well, low levels of irritation, perhaps 1-2/10  Pt reports that she is content with progression and would like to [facility]  If she has any other concerns she will follow up as needed     Quality of life: good    Pain  Current pain ratin  At best pain ratin  At worst pain ratin  Quality: dull ache, tight and sharp  Relieving factors: ice, heat, rest and change in position  Aggravating factors: standing, walking, stair climbing, lifting and overhead activity  Progression: no change      Diagnostic Tests  No diagnostic tests performed  Treatments  Previous treatment: medication  Patient Goals  Patient goals for therapy: increased strength, return to sport/leisure activities, increased motion, improved balance and decreased pain          Objective     Active Range of Motion   Cervical/Thoracic Spine Cervical  Subcranial protraction:  WFL   Subcranial retraction:  WFL   Flexion:  WFL  Extension:  WFL  Left lateral flexion:  WFL  Right lateral flexion:  WFL  Left rotation:  WFL  Right rotation:  WFL  Left Ankle/Foot   Dorsiflexion (ke): 10 degrees   Plantar flexion: 60 degrees   Great toe extension: 70 degrees     Right Ankle/Foot   Dorsiflexion (ke): 10 degrees   Plantar flexion: 60 degrees   Great toe extension: 70 degrees     Additional Active Range of Motion Details  Forward head, rounded shoulders  B/L Sensation intact to light touch C(3,4 L < R),5,6,7,8,T1,T2  DTR Bi Tri Brac all 1+ B [place] (-) B  Postural correction  L shoulder/arm pain  B  UE Screen  ROM: WFL B, ERP Abd and Flex PT OP  Strength:  3+ L shoulder ER, 3+ Elbow Ext    B all 5/5  Repeated motion   Flex  Retraction L neck and shoulder, D B  Improved shoulder ER strength, Elbow Ext, and (-) NLTT  (All without symptoms)  Ext  SB R  SB L  Joint Mobility  Palpation  STs  NLTT L + 10 degrees (nil today)  Forward head, rounded shoulders, Lordosis  Genu Valgum R >L antalgia with R IC and swing phase  B/L Sensation intact to L3,4,5,S1,S2  DTR Patella 2+ B [place] B 1+   Postural correction R achilles pain and foot  L foot pain  Repeated motion /  LS ROM  Flex min   Ext min  SB R Min  SB L Min  LE Strength  Hip   L Flex 5 Ext 5 Abd 5 Add 5  R Flex 5 Ext 5 Abd 5 Add 5  LE Screen  Knee Strength  L  5/5 flex ext  R5/5 flex/ ext  Ankle Screen:  L  DF 5/5 Gtoe SL HR 5  R DF 5/5 Gtoe 5 SL HR 10   Joint Mobility  Palpation  Sts    LE Repeated Movements    Repeated DF unloaded   N B  Semi-loaded DF D B> A  Pain with DF OP R   // B>>A (remains)   SL HR D B  (remains)                 Precautions: Hyperthyroidism, Anemia Hx and Fall Concern  Shoulder:        Manuals 12/8 12/27 1/9                                                              Neuro Re-Ed             Postural Ed and progression 10 min 10min 10 min Assessment          Back account analogy performed            Cut Finger analogy performed                                                                Ther Ex             CS Retraction D B 3 x 10 10x 10x 2 per day          Progression? Ext education                                                                           NLTT Glider L  1" 1" x 10 performed          Ther Activity             Shoulder ER strength B 2x10 GTB  review          Elbow Ext B 2x10 GTB  review          NLTT L  B B 1'  review                                    Modalities             CP/HP prn to CS                               Ankle:    Manuals 09/29 10/3 10/5 11/7 11/14 11/21 11/30 12/27 1/9 09/26   continue to monitor LS  Neuro Re-Ed    11/7         Education and Progression   assessment x 10 mins        5 min    SLS EO>>EC EC 20"x4  EC 20"x 4  EC  20"x3 pain! 20"x3 EC 20"x4 EC 20" x4  EC 20" x4  EC  EC 30"x4   SLS EO Foam 30"x3 30" x 4  30"x4 30"x4  20"x4 20" x4  20" x4   20"x4                NBOS EC 1 min 1 min  1 min      1 min                              Ther Ex    11/7         Repeated DF unloaded             Repeated Semi-loaded DF 3x10  3 x 10 reviewed to perform 2-4x's a day  3x10 3x10 3x10  3x10 3x10 review 3x10   Progression                          LP + HR 65# 3x10  70# 3 x 10  70#  3x10 ea  70# 3x10 75# 2x10 75#  2x10 75#  2x10  65# 2x10   Hip ABD/ext YTB 2x10  YTB 3 x 10  YTB  3x10 ea  YTB 3x10 RTB 2x10  RTB 2x10   2x10    gastroc ST with wedge?  20"x4 20" x 4  20"x4 20"x4 20"x4 20" x4 20" x4 review 20"x4                Ther Activity    11/7         PT DF OP             SL HR DL 3x10   SL 2x10 DL 3 x 10, SL HR 2 x 10  DL 2x10 DL 3x10    SL  2x10 DL 3x10    SL 2x10 DL 3x10    SL 2x10 DL 3x10      SL 2x10 DL 3x10      SL 2x10 review DL 3x10  SL  2x10   Ambulation    B          Elevations FF FF Alt up and down FF  x2 FF FF FFx2  review FF   minisquat 2x10 2 x 10  2x10 2x10 2x10 2x10  review 2x10 Modalities    11/7         CP to R ankle PRN 10 min dec dec 10 min 10 min 10 min  10 min   10 min

## 2023-01-19 ENCOUNTER — TELEPHONE (OUTPATIENT)
Dept: FAMILY MEDICINE CLINIC | Facility: CLINIC | Age: 53
End: 2023-01-19

## 2023-01-30 ENCOUNTER — OFFICE VISIT (OUTPATIENT)
Dept: FAMILY MEDICINE CLINIC | Facility: CLINIC | Age: 53
End: 2023-01-30

## 2023-01-30 VITALS
OXYGEN SATURATION: 100 % | HEART RATE: 84 BPM | WEIGHT: 151.2 LBS | TEMPERATURE: 98.1 F | HEIGHT: 64 IN | BODY MASS INDEX: 25.81 KG/M2 | DIASTOLIC BLOOD PRESSURE: 74 MMHG | SYSTOLIC BLOOD PRESSURE: 119 MMHG

## 2023-01-30 DIAGNOSIS — L60.8 DISCOLORATION AND THICKENING OF NAILS BOTH FEET: ICD-10-CM

## 2023-01-30 DIAGNOSIS — R10.30 LOWER ABDOMINAL PAIN: Primary | ICD-10-CM

## 2023-01-30 DIAGNOSIS — D50.9 IRON DEFICIENCY ANEMIA, UNSPECIFIED IRON DEFICIENCY ANEMIA TYPE: ICD-10-CM

## 2023-01-30 DIAGNOSIS — Z12.31 SCREENING MAMMOGRAM FOR BREAST CANCER: ICD-10-CM

## 2023-01-30 DIAGNOSIS — E03.9 HYPOTHYROIDISM, UNSPECIFIED TYPE: ICD-10-CM

## 2023-01-30 DIAGNOSIS — Z11.59 NEED FOR HEPATITIS C SCREENING TEST: ICD-10-CM

## 2023-01-30 LAB
BACTERIA UR QL AUTO: ABNORMAL /HPF
BILIRUB UR QL STRIP: NEGATIVE
CLARITY UR: CLEAR
COLOR UR: ABNORMAL
GLUCOSE UR STRIP-MCNC: NEGATIVE MG/DL
HGB UR QL STRIP.AUTO: ABNORMAL
KETONES UR STRIP-MCNC: NEGATIVE MG/DL
LEUKOCYTE ESTERASE UR QL STRIP: ABNORMAL
MUCOUS THREADS UR QL AUTO: ABNORMAL
NITRITE UR QL STRIP: NEGATIVE
NON-SQ EPI CELLS URNS QL MICRO: ABNORMAL /HPF
PH UR STRIP.AUTO: 5.5 [PH]
PROT UR STRIP-MCNC: NEGATIVE MG/DL
RBC #/AREA URNS AUTO: ABNORMAL /HPF
SL AMB  POCT GLUCOSE, UA: NEGATIVE
SL AMB LEUKOCYTE ESTERASE,UA: NORMAL
SL AMB POCT BILIRUBIN,UA: NEGATIVE
SL AMB POCT BLOOD,UA: NORMAL
SL AMB POCT CLARITY,UA: CLEAR
SL AMB POCT COLOR,UA: YELLOW
SL AMB POCT KETONES,UA: NEGATIVE
SL AMB POCT NITRITE,UA: NEGATIVE
SL AMB POCT PH,UA: 5
SL AMB POCT SPECIFIC GRAVITY,UA: 1.01
SL AMB POCT URINE PROTEIN: NORMAL
SL AMB POCT UROBILINOGEN: NEGATIVE
SP GR UR STRIP.AUTO: 1.01 (ref 1–1.03)
UROBILINOGEN UR STRIP-ACNC: <2 MG/DL
WBC #/AREA URNS AUTO: ABNORMAL /HPF

## 2023-01-30 NOTE — ASSESSMENT & PLAN NOTE
Lower abdominal and suprapubic pain, now resolved  Unclear etiology, differentials include viral gastroenteritis, constipation, UTI  Urine dip in office shows leukocytes and trace blood, will send for microscopic examination  No other urinary symptoms    Advised patient to follow-up if pain recurs

## 2023-01-30 NOTE — PROGRESS NOTES
Name: Tarah Live      : 1970      MRN: [id number]  Encounter Provider: Atnonino Taylor MD  Encounter Date: 2023   Encounter department: [address] [place] [address]  Lower abdominal pain  Assessment & Plan:  Lower abdominal and suprapubic pain, now resolved  Unclear etiology, differentials include viral gastroenteritis, constipation, UTI  Urine dip in office shows leukocytes and trace blood, will send for microscopic examination  No other urinary symptoms  Advised patient to follow-up if pain recurs    Orders:  -     UA w Reflex to Microscopic w Reflex to Culture - Clinic Collect  -     POCT urine dip  -     Urine Microscopic    2  Hypothyroidism, unspecified type  Assessment & Plan:  Planes of increased fatigue, currently on levothyroxine 137 mcg, will recheck TSH    Orders:  -     TSH, 3rd generation with Free T4 reflex; Future    3  Discoloration and thickening of nails both feet  Assessment & Plan:  Likely secondary to trauma, will follow-up in the next visit      4  Iron deficiency anemia, unspecified iron deficiency anemia type  Assessment & Plan:  Of iron deficiency anemia, will recheck CBC    Orders:  -     CBC and Platelet; Future    5  Need for hepatitis C screening test  -     Hepatitis C Antibody ([facility]); Future    6  Screening mammogram for breast cancer  -     Mammo screening bilateral w cad; Future; Expected date: 2023         Subjective      Patient presented to office with complaints of lower abdominal pain and for 2 weeks  She states that she also had constipation, subjective fevers, heaviness in the lower abdominal, but now all the symptoms have resolved  He now has only lower back pain, and takes Tylenol as needed  She is unsure what caused her symptoms but denies any abdominal pain today  She is also complaining of increased fatigue for the last few days    Is also concerned about discoloration of her big toenails, the discoloration has been there for last few months  Patient denies any trauma  Abdominal Pain  This is a new problem  The problem has been resolved  Pertinent negatives include no diarrhea, fever, headaches, nausea or vomiting  Back Pain  Associated symptoms include abdominal pain  Pertinent negatives include no chest pain, fever or headaches  Review of Systems   Constitutional: Positive for fatigue  Negative for activity change, chills and fever  HENT: Negative for congestion  Respiratory: Negative for shortness of breath  Cardiovascular: Negative for chest pain  Gastrointestinal: Positive for abdominal pain  Negative for diarrhea, nausea and vomiting  Genitourinary: Negative for difficulty urinating  Musculoskeletal: Positive for back pain  Neurological: Negative for headaches         Current Outpatient Medications on File Prior to Visit   Medication Sig   • cholecalciferol (VITAMIN D3) 1,000 units tablet Take 1 tablet (1,000 Units total) by mouth daily   • ciprofloxacin-dexamethasone (CIPRODEX) otic suspension Administer 4 drops to the right ear 2 (two) times a day   • clobetasol (TEMOVATE) 0 05 % cream Apply topically 2 (two) times a day   • docusate sodium (COLACE) 100 mg capsule Take 1 capsule by mouth 2 (two) times a day as needed   • escitalopram (LEXAPRO) 5 mg tablet Take 1 tablet (5 mg total) by mouth daily   • hydrocortisone 2 5 % cream Apply topically 2 (two) times a day   • levothyroxine (Euthyrox) 137 mcg tablet Take 1 tablet (137 mcg total) by mouth daily   • loratadine (CLARITIN) 10 mg tablet Take 1 tablet (10 mg total) by mouth daily (Patient taking differently: Take 10 mg by mouth daily PRN)   • Multiple Vitamin (MULTIVITAMIN) capsule Take 1 capsule by mouth daily   • nystatin (MYCOSTATIN) cream Apply topically 2 (two) times a day   • polyethylene glycol (GLYCOLAX) 17 GM/SCOOP powder Take 17 g by mouth daily PRN   • psyllium (METAMUCIL SMOOTH TEXTURE) 28 % packet Take 1 packet by mouth 2 (two) times a day   • sodium chloride (OCEAN) 0 65 % nasal spray 1 spray into each nostril as needed for congestion or rhinitis (Patient taking differently: 1 spray into each nostril as needed for congestion or rhinitis PRN)   • triamcinolone (KENALOG) 0 1 % ointment Apply topically 2 (two) times a day       Objective     /74 (BP Location: Right arm, Patient Position: Sitting, Cuff Size: Large)   Pulse 84   Temp 98 1 °F (36 7 °C) (Temporal)   Ht 5' 4" (1 626 m)   Wt 68 6 kg (151 lb 3 2 oz)   LMP 08/15/2020 (Approximate)   SpO2 100%   BMI 25 95 kg/m²     Physical Exam  Constitutional:       Appearance: She is well-developed  HENT:      Head: Normocephalic and atraumatic  Right Ear: External ear normal       Left Ear: External ear normal    Eyes:      Conjunctiva/sclera: Conjunctivae normal       Pupils: Pupils are equal, round, and reactive to light  Cardiovascular:      Rate and Rhythm: Normal rate and regular rhythm  Heart sounds: Normal heart sounds  No murmur heard  No friction rub  Pulmonary:      Effort: Pulmonary effort is normal  No respiratory distress  Breath sounds: Normal breath sounds  No wheezing or rales  Abdominal:      General: Bowel sounds are normal  There is no distension  Palpations: Abdomen is soft  There is no mass  Tenderness: There is no abdominal tenderness  Comments: No CVA tenderness   Musculoskeletal:         General: Normal range of motion  Cervical back: Normal range of motion and neck supple  Skin:     General: Skin is warm and dry  Comments: Discoloration of right great toenail   Neurological:      Mental Status: She is alert and oriented to person, place, and time         Maverick Murphy MD

## 2023-01-31 LAB — BACTERIA UR CULT: NORMAL

## 2023-02-06 ENCOUNTER — APPOINTMENT (OUTPATIENT)
Dept: LAB | Facility: CLINIC | Age: 53
End: 2023-02-06

## 2023-02-06 DIAGNOSIS — E03.9 HYPOTHYROIDISM, UNSPECIFIED TYPE: ICD-10-CM

## 2023-02-06 DIAGNOSIS — D50.9 IRON DEFICIENCY ANEMIA, UNSPECIFIED IRON DEFICIENCY ANEMIA TYPE: ICD-10-CM

## 2023-02-06 DIAGNOSIS — Z11.59 NEED FOR HEPATITIS C SCREENING TEST: ICD-10-CM

## 2023-02-06 LAB
ERYTHROCYTE [DISTWIDTH] IN BLOOD BY AUTOMATED COUNT: 16.6 % (ref 11.6–15.1)
HCT VFR BLD AUTO: 34.8 % (ref 34.8–46.1)
HCV AB SER QL: NORMAL
HGB BLD-MCNC: 10.8 G/DL (ref 11.5–15.4)
MCH RBC QN AUTO: 25.1 PG (ref 26.8–34.3)
MCHC RBC AUTO-ENTMCNC: 31 G/DL (ref 31.4–37.4)
MCV RBC AUTO: 81 FL (ref 82–98)
PLATELET # BLD AUTO: 337 THOUSANDS/UL (ref 149–390)
PMV BLD AUTO: 9 FL (ref 8.9–12.7)
RBC # BLD AUTO: 4.31 MILLION/UL (ref 3.81–5.12)
T4 FREE SERPL-MCNC: 1.2 NG/DL (ref 0.76–1.46)
TSH SERPL DL<=0.05 MIU/L-ACNC: 11.06 UIU/ML (ref 0.45–4.5)
WBC # BLD AUTO: 5.1 THOUSAND/UL (ref 4.31–10.16)

## 2023-02-06 RX ORDER — LEVOTHYROXINE SODIUM 0.15 MG/1
137 TABLET ORAL DAILY
Qty: 60 TABLET | Refills: 0 | Status: SHIPPED | OUTPATIENT
Start: 2023-02-06 | End: 2023-04-04 | Stop reason: SDUPTHER

## 2023-02-21 ENCOUNTER — TELEPHONE (OUTPATIENT)
Dept: OBGYN CLINIC | Facility: CLINIC | Age: 53
End: 2023-02-21

## 2023-02-21 DIAGNOSIS — D21.9 FIBROIDS: ICD-10-CM

## 2023-02-21 DIAGNOSIS — R10.2 PELVIC PAIN: Primary | ICD-10-CM

## 2023-02-21 NOTE — TELEPHONE ENCOUNTER
Pt called and said she is having pelvic pain and pressure  She would like a call back to schedule appointment

## 2023-02-21 NOTE — TELEPHONE ENCOUNTER
Returned patient's call to get more information in her pain  She has been experiencing pelvic pain on and off for about a month  Patient noted that she had to take an aleve yesterday for pain  She rates her pain 4-10  She stated that her pain gets worse when she stands

## 2023-02-23 ENCOUNTER — OFFICE VISIT (OUTPATIENT)
Dept: FAMILY MEDICINE CLINIC | Facility: CLINIC | Age: 53
End: 2023-02-23

## 2023-02-23 VITALS
SYSTOLIC BLOOD PRESSURE: 133 MMHG | BODY MASS INDEX: 25.92 KG/M2 | DIASTOLIC BLOOD PRESSURE: 84 MMHG | TEMPERATURE: 97.9 F | WEIGHT: 151 LBS | HEART RATE: 79 BPM | OXYGEN SATURATION: 100 %

## 2023-02-23 DIAGNOSIS — R10.30 LOWER ABDOMINAL PAIN: Primary | ICD-10-CM

## 2023-02-23 DIAGNOSIS — N76.0 ACUTE VAGINITIS: ICD-10-CM

## 2023-02-23 LAB
BACTERIA UR QL AUTO: NORMAL /HPF
BILIRUB UR QL STRIP: NEGATIVE
CLARITY UR: CLEAR
COLOR UR: COLORLESS
GLUCOSE UR STRIP-MCNC: NEGATIVE MG/DL
HGB UR QL STRIP.AUTO: NEGATIVE
KETONES UR STRIP-MCNC: NEGATIVE MG/DL
LEUKOCYTE ESTERASE UR QL STRIP: ABNORMAL
NITRITE UR QL STRIP: NEGATIVE
NON-SQ EPI CELLS URNS QL MICRO: NORMAL /HPF
PH UR STRIP.AUTO: 5 [PH]
PROT UR STRIP-MCNC: NEGATIVE MG/DL
RBC #/AREA URNS AUTO: NORMAL /HPF
SL AMB  POCT GLUCOSE, UA: NEGATIVE
SL AMB LEUKOCYTE ESTERASE,UA: NORMAL
SL AMB POCT BILIRUBIN,UA: NEGATIVE
SL AMB POCT BLOOD,UA: NORMAL
SL AMB POCT CLARITY,UA: CLEAR
SL AMB POCT COLOR,UA: YELLOW
SL AMB POCT KETONES,UA: NEGATIVE
SL AMB POCT NITRITE,UA: NEGATIVE
SL AMB POCT PH,UA: 7.5
SL AMB POCT SPECIFIC GRAVITY,UA: 1.02
SL AMB POCT URINE PROTEIN: NEGATIVE
SL AMB POCT UROBILINOGEN: 0.2
SP GR UR STRIP.AUTO: 1 (ref 1–1.03)
UROBILINOGEN UR STRIP-ACNC: <2 MG/DL
WBC #/AREA URNS AUTO: NORMAL /HPF

## 2023-02-23 NOTE — PROGRESS NOTES
Name: Dayanna Hdz      : 1970      MRN: [id number]  Encounter Provider: Marleni Domínguez MD  Encounter Date: 2023   Encounter department: [address] [place] [address]  Lower abdominal pain  Assessment & Plan:  Unclear etiology, patient does have ultrasound of pelvic organs scheduled for next week  UA in office unremarkable, will send to lab for microscopic examination  On speculum examination, clear discharge noted  Patient does have mild cystocele and rectocele that could be contributing to pelvic pressure  Affirm and GC swab sent for completion  Orders:  -     UA w Reflex to Microscopic w Reflex to Culture -Lab Collect; Future; Expected date: 2023  -     POCT urine dip  -     Chlamydia/GC amplified DNA by PCR; Future    2  Acute vaginitis  -     VAGINOSIS DNA PROBE (AFFIRM); Future         Subjective      Patient presented to office with complaints of vaginal pain for the last 4 to 5 days  She also complains of pressure-like pain  She denies any burning with urination, discharge, pruritus  She denies any bleeding from vagina  Denies any back pain or abdominal pain associated with the pain  She also admits to chills yesterday  Vaginal Pain  The patient's primary symptoms include pelvic pain  The patient's pertinent negatives include no genital itching, genital lesions, genital odor, genital rash, missed menses, vaginal bleeding or vaginal discharge  This is a new problem  The current episode started in the past 7 days  The problem occurs intermittently  The problem has been unchanged  She is not pregnant  Pertinent negatives include no abdominal pain, back pain, constipation, diarrhea, dysuria, fever, flank pain, frequency, headaches or hematuria  Nothing aggravates the symptoms  She has tried acetaminophen for the symptoms  The treatment provided mild relief  She is sexually active  No, her partner does not have an STD   She is postmenopausal      Review of Systems   Constitutional: Negative for fever  Gastrointestinal: Negative for abdominal pain, constipation and diarrhea  Genitourinary: Positive for pelvic pain and vaginal pain  Negative for dysuria, flank pain, frequency, hematuria, missed menses and vaginal discharge  Musculoskeletal: Negative for back pain  Neurological: Negative for headaches         Current Outpatient Medications on File Prior to Visit   Medication Sig   • cholecalciferol (VITAMIN D3) 1,000 units tablet Take 1 tablet (1,000 Units total) by mouth daily   • ciprofloxacin-dexamethasone (CIPRODEX) otic suspension Administer 4 drops to the right ear 2 (two) times a day   • clobetasol (TEMOVATE) 0 05 % cream Apply topically 2 (two) times a day   • docusate sodium (COLACE) 100 mg capsule Take 1 capsule by mouth 2 (two) times a day as needed   • escitalopram (LEXAPRO) 5 mg tablet Take 1 tablet (5 mg total) by mouth daily   • hydrocortisone 2 5 % cream Apply topically 2 (two) times a day   • levothyroxine (Euthyrox) 150 mcg tablet Take 1 tablet (150 mcg total) by mouth daily   • loratadine (CLARITIN) 10 mg tablet Take 1 tablet (10 mg total) by mouth daily (Patient taking differently: Take 10 mg by mouth daily PRN)   • Multiple Vitamin (MULTIVITAMIN) capsule Take 1 capsule by mouth daily   • nystatin (MYCOSTATIN) cream Apply topically 2 (two) times a day   • polyethylene glycol (GLYCOLAX) 17 GM/SCOOP powder Take 17 g by mouth daily PRN   • psyllium (METAMUCIL SMOOTH TEXTURE) 28 % packet Take 1 packet by mouth 2 (two) times a day   • sodium chloride (OCEAN) 0 65 % nasal spray 1 spray into each nostril as needed for congestion or rhinitis (Patient taking differently: 1 spray into each nostril as needed for congestion or rhinitis PRN)   • triamcinolone (KENALOG) 0 1 % ointment Apply topically 2 (two) times a day       Objective     /84 (BP Location: Right arm, Patient Position: Sitting, Cuff Size: Standard) Pulse 79   Temp 97 9 °F (36 6 °C) (Temporal)   Wt 68 5 kg (151 lb)   LMP 08/15/2020 (Approximate)   SpO2 100%   BMI 25 92 kg/m²     Physical Exam  Constitutional:       Appearance: She is well-developed  HENT:      Head: Normocephalic and atraumatic  Eyes:      Conjunctiva/sclera: Conjunctivae normal    Abdominal:      General: Bowel sounds are normal  There is no distension  Palpations: Abdomen is soft  There is no mass  Tenderness: There is no abdominal tenderness  Genitourinary:     General: Normal vulva  Comments: On visual inspection, no lesions noted  On speculum exam, cystocele and rectocele noted, clear discharge, multiparous cervix  Musculoskeletal:         General: Normal range of motion  Cervical back: Normal range of motion and neck supple  Skin:     General: Skin is warm and dry  Neurological:      Mental Status: She is alert and oriented to person, place, and time         Sindhu Jung MD

## 2023-02-23 NOTE — ASSESSMENT & PLAN NOTE
Unclear etiology, patient does have ultrasound of pelvic organs scheduled for next week  UA in office unremarkable, will send to lab for microscopic examination  On speculum examination, clear discharge noted  Patient does have mild cystocele and rectocele that could be contributing to pelvic pressure  Affirm and GC swab sent for completion

## 2023-02-24 ENCOUNTER — TELEPHONE (OUTPATIENT)
Dept: FAMILY MEDICINE CLINIC | Facility: CLINIC | Age: 53
End: 2023-02-24

## 2023-02-24 LAB
CANDIDA RRNA VAG QL PROBE: NEGATIVE
G VAGINALIS RRNA GENITAL QL PROBE: NEGATIVE
T VAGINALIS RRNA GENITAL QL PROBE: NEGATIVE

## 2023-02-24 NOTE — TELEPHONE ENCOUNTER
[facility] lab is calling the chlamydia sample that was received yesterday is not acceptable  They cancelled orders for patient

## 2023-02-27 ENCOUNTER — HOSPITAL ENCOUNTER (OUTPATIENT)
Dept: RADIOLOGY | Age: 53
Discharge: HOME | End: 2023-02-27

## 2023-02-27 DIAGNOSIS — D21.9 FIBROIDS: ICD-10-CM

## 2023-02-27 DIAGNOSIS — R10.2 PELVIC PAIN: ICD-10-CM

## 2023-02-27 NOTE — RESULT ENCOUNTER NOTE
Tried calling patient, she did not answer    Please call her and let her know that all the results collected in the office 4 days ago were normal

## 2023-03-09 ENCOUNTER — OFFICE VISIT (OUTPATIENT)
Dept: OBGYN CLINIC | Facility: CLINIC | Age: 53
End: 2023-03-09

## 2023-03-09 VITALS — SYSTOLIC BLOOD PRESSURE: 120 MMHG | WEIGHT: 150 LBS | BODY MASS INDEX: 25.75 KG/M2 | DIASTOLIC BLOOD PRESSURE: 90 MMHG

## 2023-03-09 DIAGNOSIS — N95.2 VAGINAL ATROPHY: Primary | ICD-10-CM

## 2023-03-09 DIAGNOSIS — K59.00 CONSTIPATION, UNSPECIFIED CONSTIPATION TYPE: ICD-10-CM

## 2023-03-09 NOTE — PROGRESS NOTES
SUBJECTIVE:     50 y o  female complains of intermittent internal vaginal pain and pubic pressure for two months that waxes and wanes  She gets a sharp pain in vagina for only a few seconds  Feels mild vaginal burning today  She feels like her vagina is dry  She states that she has suprapubic pressure that is worse with long periods of standing  Describes as throbbing  Applying ice to area which helps  She does admit to suffering from constipation  She states that sometimes she feels like she needs to splint herself to move her bowels  Denies abnormal vaginal bleeding, significant pelvic pain, or dysuria  No UTI symptoms  Denies history of known exposure to STD  Felt chills before seeing her primary care doctor on 2/23, urine sample was collected and was negative  Patient is sexually active, mild discomfort with intimacy  Patient's last menstrual period was 08/15/2020 (approximate)  OBJECTIVE:     Physical Exam  Constitutional:       Appearance: Normal appearance  Genitourinary:      Bladder and urethral meatus normal       Right Labia: No rash, tenderness, lesions, skin changes or Bartholin's cyst      Left Labia: No tenderness, lesions, skin changes, Bartholin's cyst or rash  No labial fusion noted  No vaginal discharge, erythema, tenderness, bleeding, ulceration or granulation tissue  No vaginal prolapse present  Mild vaginal atrophy present  Right Adnexa: not tender, not full and no mass present  Left Adnexa: not tender, not full and no mass present  Cervix is parous  Uterus is not enlarged, fixed, tender or irregular  No uterine mass detected  Uterus is anteverted  No urethral prolapse, tenderness or mass present  Levator ani not tender, obturator internus not tender and no pelvic spasms present  Pelvic exam was performed with patient in the lithotomy position  HENT:      Head: Normocephalic and atraumatic     Musculoskeletal: Cervical back: Neck supple  Neurological:      Mental Status: She is alert  Skin:     General: Skin is warm  Psychiatric:         Behavior: Behavior is cooperative  Vitals and nursing note reviewed  ASSESSMENT ANND PLAN:     Carmen Irving was seen today for vaginal pain  Diagnoses and all orders for this visit:    Vaginal atrophy    Constipation, unspecified constipation type      Mild vaginal atrophy and dryness  Discussed ensuring that she use lubrication with intimacy and she can also use OTC Replens - this is a vaginal moisturizer she can find in the pharmacy  It is a small capsule she inserts in her vagina to restore vaginal moisture  Discussed adequate hydration and a diet high in fiber to ensure that her bowels are soft and that she does not drain       Patient needs to schedule appt for annual exam

## 2023-03-10 ENCOUNTER — HOSPITAL ENCOUNTER (OUTPATIENT)
Dept: RADIOLOGY | Age: 53
Discharge: HOME | End: 2023-03-10

## 2023-03-10 VITALS — WEIGHT: 150 LBS | BODY MASS INDEX: 25.61 KG/M2 | HEIGHT: 64 IN

## 2023-03-10 DIAGNOSIS — Z12.31 ENCOUNTER FOR SCREENING MAMMOGRAM FOR MALIGNANT NEOPLASM OF BREAST: ICD-10-CM

## 2023-03-10 LAB
C TRACH DNA SPEC QL NAA+PROBE: NEGATIVE
N GONORRHOEA DNA SPEC QL NAA+PROBE: NEGATIVE

## 2023-03-28 ENCOUNTER — TELEPHONE (OUTPATIENT)
Dept: OBGYN CLINIC | Facility: CLINIC | Age: 53
End: 2023-03-28

## 2023-03-28 NOTE — TELEPHONE ENCOUNTER
Spoke to patient, she is having irritation and pain. She is not itchy, no discharge and no odor. Patient offered sooner appointment in [place] but only wanted to come to the [place] office. Patient scheduled for Friday.

## 2023-03-30 ENCOUNTER — OFFICE VISIT (OUTPATIENT)
Dept: FAMILY MEDICINE CLINIC | Facility: CLINIC | Age: 53
End: 2023-03-30

## 2023-03-30 VITALS
WEIGHT: 151 LBS | TEMPERATURE: 98.1 F | DIASTOLIC BLOOD PRESSURE: 76 MMHG | SYSTOLIC BLOOD PRESSURE: 110 MMHG | BODY MASS INDEX: 25.78 KG/M2 | HEIGHT: 64 IN | HEART RATE: 78 BPM | RESPIRATION RATE: 16 BRPM | OXYGEN SATURATION: 100 %

## 2023-03-30 DIAGNOSIS — E03.9 HYPOTHYROIDISM, UNSPECIFIED TYPE: Primary | ICD-10-CM

## 2023-03-30 DIAGNOSIS — J30.89 ALLERGIC RHINITIS DUE TO OTHER ALLERGIC TRIGGER, UNSPECIFIED SEASONALITY: ICD-10-CM

## 2023-03-30 DIAGNOSIS — D50.9 IRON DEFICIENCY ANEMIA, UNSPECIFIED IRON DEFICIENCY ANEMIA TYPE: ICD-10-CM

## 2023-03-30 RX ORDER — FLUTICASONE PROPIONATE 50 MCG
1 SPRAY, SUSPENSION (ML) NASAL DAILY
Qty: 9.9 ML | Refills: 1 | Status: SHIPPED | OUTPATIENT
Start: 2023-03-30

## 2023-03-30 NOTE — ASSESSMENT & PLAN NOTE
CBC in February showed hemoglobin of 10 8  Patient unable to tolerate oral iron because of constipation  Will repeat CBC and iron panel

## 2023-03-30 NOTE — PROGRESS NOTES
Name: Milton Lowe      : 1970      MRN: [id number]  Encounter Provider: Akbar Saucedo MD  Encounter Date: 3/30/2023   Encounter department: [address] [place] [address]     1  Hypothyroidism, unspecified type  Assessment & Plan:  Levothyroxine was recently increased to 150 mcg daily, will recheck TSH  Prescription given today  Orders:  -     TSH, 3rd generation; Future    2  Iron deficiency anemia, unspecified iron deficiency anemia type  Assessment & Plan:  CBC in February showed hemoglobin of 10 8  Patient unable to tolerate oral iron because of constipation  Will repeat CBC and iron panel  Orders:  -     Iron Panel (Includes Ferritin, Iron Sat%, Iron, and TIBC); Future  -     CBC and differential; Future    3  Allergic rhinitis due to other allergic trigger, unspecified seasonality  Assessment & Plan:  Discussed use of Flonase, prescription sent to the pharmacy    Orders:  -     fluticasone (FLONASE) 50 mcg/act nasal spray; 1 spray into each nostril daily         Subjective      Milton Lowe to office for follow-up visit of hypothyroidism  She reports abdominal pain was better, but 3 days ago she started to feel that lower abdominal pressure again  Will be seeing OB/GYN tomorrow  She did get ultrasound of pelvic organs done  She also feels tired, but reports her symptoms of depression have improved  She is also complaining of seasonal allergies and nasal congestion  Review of Systems   Constitutional: Positive for fatigue  Negative for chills and fever  HENT: Positive for congestion  Respiratory: Negative for shortness of breath  Cardiovascular: Negative for chest pain  Gastrointestinal: Positive for constipation  Negative for diarrhea, nausea and vomiting  Genitourinary: Negative for difficulty urinating  Neurological: Positive for headaches         Current Outpatient Medications on File Prior to Visit   Medication Sig   • "cholecalciferol (VITAMIN D3) 1,000 units tablet Take 1 tablet (1,000 Units total) by mouth daily   • clobetasol (TEMOVATE) 0 05 % cream Apply topically 2 (two) times a day   • docusate sodium (COLACE) 100 mg capsule Take 1 capsule by mouth 2 (two) times a day as needed   • hydrocortisone 2 5 % cream Apply topically 2 (two) times a day   • levothyroxine (Euthyrox) 150 mcg tablet Take 1 tablet (150 mcg total) by mouth daily   • loratadine (CLARITIN) 10 mg tablet Take 1 tablet (10 mg total) by mouth daily (Patient taking differently: Take 10 mg by mouth daily PRN)   • Multiple Vitamin (MULTIVITAMIN) capsule Take 1 capsule by mouth daily   • nystatin (MYCOSTATIN) cream Apply topically 2 (two) times a day   • polyethylene glycol (GLYCOLAX) 17 GM/SCOOP powder Take 17 g by mouth daily PRN   • psyllium (METAMUCIL SMOOTH TEXTURE) 28 % packet Take 1 packet by mouth 2 (two) times a day   • sodium chloride (OCEAN) 0 65 % nasal spray 1 spray into each nostril as needed for congestion or rhinitis (Patient taking differently: 1 spray into each nostril as needed for congestion or rhinitis PRN)   • triamcinolone (KENALOG) 0 1 % ointment Apply topically 2 (two) times a day   • escitalopram (LEXAPRO) 5 mg tablet Take 1 tablet (5 mg total) by mouth daily (Patient not taking: Reported on 3/9/2023)       Objective     /76 (BP Location: Left arm, Patient Position: Sitting, Cuff Size: Large)   Pulse 78   Temp 98 1 °F (36 7 °C) (Temporal)   Resp 16   Ht 5' 4\" (1 626 m)   Wt 68 5 kg (151 lb)   LMP 08/15/2020 (Approximate)   SpO2 100%   BMI 25 92 kg/m²     Physical Exam  Vitals reviewed  Constitutional:       Appearance: She is well-developed  HENT:      Head: Normocephalic and atraumatic  Right Ear: External ear normal       Left Ear: External ear normal    Eyes:      Conjunctiva/sclera: Conjunctivae normal    Cardiovascular:      Rate and Rhythm: Normal rate and regular rhythm  Heart sounds: Normal heart sounds   " No murmur heard  No friction rub  Pulmonary:      Effort: Pulmonary effort is normal  No respiratory distress  Breath sounds: Normal breath sounds  No wheezing or rales  Musculoskeletal:         General: Normal range of motion  Cervical back: Normal range of motion and neck supple  Skin:     General: Skin is warm and dry  Neurological:      Mental Status: She is alert and oriented to person, place, and time         Kamar Benoit MD

## 2023-03-31 ENCOUNTER — OFFICE VISIT (OUTPATIENT)
Dept: OBGYN CLINIC | Facility: CLINIC | Age: 53
End: 2023-03-31

## 2023-03-31 VITALS
HEIGHT: 64 IN | WEIGHT: 151 LBS | BODY MASS INDEX: 25.78 KG/M2 | DIASTOLIC BLOOD PRESSURE: 78 MMHG | SYSTOLIC BLOOD PRESSURE: 120 MMHG

## 2023-03-31 DIAGNOSIS — N81.10 CYSTOCELE WITH RECTOCELE: ICD-10-CM

## 2023-03-31 DIAGNOSIS — Z12.4 PAP SMEAR FOR CERVICAL CANCER SCREENING: Primary | ICD-10-CM

## 2023-03-31 DIAGNOSIS — N81.6 CYSTOCELE WITH RECTOCELE: ICD-10-CM

## 2023-03-31 NOTE — PROGRESS NOTES
Assessment/Plan:       Problem List Items Addressed This Visit    None  Visit Diagnoses     Pap smear for cervical cancer screening    -  Primary    Relevant Orders    Liquid-based pap, screening    Cystocele with rectocele        Relevant Orders    Ambulatory Referral to Physical Therapy    Ambulatory Referral to Urogynecology          Pap updated today to rule out any cervical abnormalities that could be a cause of pelvic/vaginal pain  Reviewed physical exam findings with the patient that was significant for rectocele and cystocele  Discussed that these findings could be the possible cause of her pelvic/vaginal discomfort  Recommended pelvic PT for rectocele, constipation, and cystocele  Referral ordered for urogynecology due to cystocele  Subjective:      Patient ID: George Slaughter is a 50 y o  female  Patient presents to the office for complaints of vaginal pain and abdominal pain without vaginal discharge/odor/vaginal irritation/vulvar itching  She reports the pain primarily occurs with episodes of movement and/or prolonged standing  Denies urinary symptoms of buring/frequency/ urgency  Urine culture done on 2/23 and unremarkable  She is sexually active with  but denies dyspareunia  She was seen in [place] on 03/09 for similar concern and was instructed to use Replens for vaginal dryness  She was recently seen in PCP for similar concern on 2/23/23 and vaginal panel was normal  PCP noted a rectocele and cystocele  STI testing done on 03/09; negative  Last pap: 2019 NILM/HPV-  She has a history of constipation that she follows with her PCP  Pelvic US done on 2/27/23 that showed multiple small intramural/subserosal fibroids         The following portions of the patient's history were reviewed and updated as appropriate:   She  has a past medical history of Allergic, Anemia, Disease of thyroid gland, Dyspareunia in female, Fatigue, Hemorrhoids, Hypothyroidism, Rectal bleeding, and Varicose veins of both legs with edema  She   Patient Active Problem List    Diagnosis Date Noted   • Discoloration and thickening of nails both feet 01/30/2023   • Lower abdominal pain 01/30/2023   • Allergic rhinitis 11/21/2022   • Left shoulder pain 11/17/2022   • Pain in both hands 11/17/2022   • Contact dermatitis and eczema 11/17/2022   • Pain of right heel 08/04/2022   • Change in blood pressure 05/23/2022   • Plantar fasciitis, bilateral 05/23/2022   • Atopic dermatitis 05/23/2022   • Otitis externa 01/27/2022   • Current moderate episode of major depressive disorder without prior episode ([facility] [address] ) 07/29/2021   • Vitamin D deficiency 01/18/2021   • Memory change 09/11/2020   • Gynecologic exam normal 09/01/2020   • Eczema 08/10/2020   • Spider vein of left lower extremity 01/29/2020   • Other hydronephrosis 07/02/2019   • Irregular menses 06/19/2019   • Low back pain 04/24/2019   • Rectal bleeding 03/06/2019   • Hemorrhoids 02/19/2019   • Screening for cardiovascular condition 03/16/2018   • Constipation 07/26/2013   • Fatigue 01/11/2013   • Hypothyroidism 01/11/2013   • Iron deficiency anemia 01/11/2013     She  has a past surgical history that includes Colonoscopy (2008) and Colonoscopy (11/15/2019)  Her family history includes Asthma in her father; Hypertension in her mother; No Known Problems in her brother, daughter, maternal aunt, maternal aunt, maternal grandfather, maternal grandmother, paternal aunt, paternal aunt, paternal aunt, paternal grandfather, paternal grandmother, sister, and son  She  reports that she has never smoked  She has never used smokeless tobacco  She reports that she does not drink alcohol and does not use drugs    Current Outpatient Medications   Medication Sig Dispense Refill   • cholecalciferol (VITAMIN D3) 1,000 units tablet Take 1 tablet (1,000 Units total) by mouth daily 90 tablet 1   • clobetasol (TEMOVATE) 0 05 % cream Apply topically 2 (two) times a day 30 g 0   • docusate sodium (COLACE) 100 mg capsule Take 1 capsule by mouth 2 (two) times a day as needed     • fluticasone (FLONASE) 50 mcg/act nasal spray 1 spray into each nostril daily 9 9 mL 1   • hydrocortisone 2 5 % cream Apply topically 2 (two) times a day 30 g 0   • levothyroxine (Euthyrox) 150 mcg tablet Take 1 tablet (150 mcg total) by mouth daily 60 tablet 0   • loratadine (CLARITIN) 10 mg tablet Take 1 tablet (10 mg total) by mouth daily (Patient taking differently: Take 10 mg by mouth daily PRN) 90 tablet 1   • Multiple Vitamin (MULTIVITAMIN) capsule Take 1 capsule by mouth daily 90 capsule 3   • nystatin (MYCOSTATIN) cream Apply topically 2 (two) times a day 30 g 0   • polyethylene glycol (GLYCOLAX) 17 GM/SCOOP powder Take 17 g by mouth daily PRN     • psyllium (METAMUCIL SMOOTH TEXTURE) 28 % packet Take 1 packet by mouth 2 (two) times a day 30 tablet 2   • sodium chloride (OCEAN) 0 65 % nasal spray 1 spray into each nostril as needed for congestion or rhinitis (Patient taking differently: 1 spray into each nostril as needed for congestion or rhinitis PRN) 30 mL 1   • triamcinolone (KENALOG) 0 1 % ointment Apply topically 2 (two) times a day 80 g 3   • escitalopram (LEXAPRO) 5 mg tablet Take 1 tablet (5 mg total) by mouth daily (Patient not taking: Reported on 3/9/2023) 30 tablet 1     No current facility-administered medications for this visit       Current Outpatient Medications on File Prior to Visit   Medication Sig   • cholecalciferol (VITAMIN D3) 1,000 units tablet Take 1 tablet (1,000 Units total) by mouth daily   • clobetasol (TEMOVATE) 0 05 % cream Apply topically 2 (two) times a day   • docusate sodium (COLACE) 100 mg capsule Take 1 capsule by mouth 2 (two) times a day as needed   • fluticasone (FLONASE) 50 mcg/act nasal spray 1 spray into each nostril daily   • hydrocortisone 2 5 % cream Apply topically 2 (two) times a day   • levothyroxine (Euthyrox) 150 mcg tablet Take 1 tablet (150 mcg total) by mouth daily   • loratadine "(CLARITIN) 10 mg tablet Take 1 tablet (10 mg total) by mouth daily (Patient taking differently: Take 10 mg by mouth daily PRN)   • Multiple Vitamin (MULTIVITAMIN) capsule Take 1 capsule by mouth daily   • nystatin (MYCOSTATIN) cream Apply topically 2 (two) times a day   • polyethylene glycol (GLYCOLAX) 17 GM/SCOOP powder Take 17 g by mouth daily PRN   • psyllium (METAMUCIL SMOOTH TEXTURE) 28 % packet Take 1 packet by mouth 2 (two) times a day   • sodium chloride (OCEAN) 0 65 % nasal spray 1 spray into each nostril as needed for congestion or rhinitis (Patient taking differently: 1 spray into each nostril as needed for congestion or rhinitis PRN)   • triamcinolone (KENALOG) 0 1 % ointment Apply topically 2 (two) times a day   • escitalopram (LEXAPRO) 5 mg tablet Take 1 tablet (5 mg total) by mouth daily (Patient not taking: Reported on 3/9/2023)     No current facility-administered medications on file prior to visit  She is allergic to amoxicillin, kiwi extract - food allergy, and sulfa antibiotics       Review of Systems   Constitutional: Negative for chills and fever  Gastrointestinal: Positive for abdominal pain and constipation  Negative for diarrhea and nausea  Genitourinary: Positive for pelvic pain and vaginal pain  Negative for dyspareunia, dysuria, frequency, hematuria, menstrual problem, vaginal bleeding and vaginal discharge  Objective:      /78 (BP Location: Left arm, Patient Position: Sitting, Cuff Size: Standard)   Ht 5' 4\" (1 626 m)   Wt 68 5 kg (151 lb)   LMP 08/15/2020 (Approximate)   BMI 25 92 kg/m²          Physical Exam  Vitals and nursing note reviewed  Constitutional:       Appearance: Normal appearance  HENT:      Head: Normocephalic  Eyes:      Conjunctiva/sclera: Conjunctivae normal    Cardiovascular:      Rate and Rhythm: Normal rate and regular rhythm  Heart sounds: Normal heart sounds     Pulmonary:      Effort: Pulmonary effort is normal       Breath " sounds: Normal breath sounds  Abdominal:      General: Abdomen is flat  Palpations: Abdomen is soft  Tenderness: There is no right CVA tenderness or left CVA tenderness  Genitourinary:     General: Normal vulva  Exam position: Lithotomy position  Pubic Area: No rash or pubic lice  Labia:         Right: No rash or tenderness  Left: No rash or tenderness  Urethra: No urethral pain  Vagina: No vaginal discharge  Cervix: Normal       Uterus: Normal        Adnexa: Right adnexa normal and left adnexa normal       Comments: +cystocele  +rectocele  Musculoskeletal:         General: Normal range of motion  Cervical back: Normal range of motion  Lymphadenopathy:      Cervical: No cervical adenopathy  Lower Body: No right inguinal adenopathy  No left inguinal adenopathy  Skin:     General: Skin is warm and dry  Neurological:      Mental Status: She is alert and oriented to person, place, and time  Psychiatric:         Mood and Affect: Mood normal          Behavior: Behavior normal          Thought Content:  Thought content normal          Judgment: Judgment normal

## 2023-04-03 ENCOUNTER — APPOINTMENT (OUTPATIENT)
Dept: LAB | Facility: CLINIC | Age: 53
End: 2023-04-03

## 2023-04-03 DIAGNOSIS — E03.9 HYPOTHYROIDISM, UNSPECIFIED TYPE: ICD-10-CM

## 2023-04-03 DIAGNOSIS — D50.9 IRON DEFICIENCY ANEMIA, UNSPECIFIED IRON DEFICIENCY ANEMIA TYPE: ICD-10-CM

## 2023-04-03 LAB
BASOPHILS # BLD AUTO: 0.05 THOUSANDS/ÂΜL (ref 0–0.1)
BASOPHILS NFR BLD AUTO: 1 % (ref 0–1)
EOSINOPHIL # BLD AUTO: 0.3 THOUSAND/ÂΜL (ref 0–0.61)
EOSINOPHIL NFR BLD AUTO: 6 % (ref 0–6)
ERYTHROCYTE [DISTWIDTH] IN BLOOD BY AUTOMATED COUNT: 16.3 % (ref 11.6–15.1)
FERRITIN SERPL-MCNC: 4 NG/ML (ref 8–388)
HCT VFR BLD AUTO: 35 % (ref 34.8–46.1)
HGB BLD-MCNC: 10.8 G/DL (ref 11.5–15.4)
IMM GRANULOCYTES # BLD AUTO: 0.01 THOUSAND/UL (ref 0–0.2)
IMM GRANULOCYTES NFR BLD AUTO: 0 % (ref 0–2)
LYMPHOCYTES # BLD AUTO: 1.71 THOUSANDS/ÂΜL (ref 0.6–4.47)
LYMPHOCYTES NFR BLD AUTO: 32 % (ref 14–44)
MCH RBC QN AUTO: 25.1 PG (ref 26.8–34.3)
MCHC RBC AUTO-ENTMCNC: 30.9 G/DL (ref 31.4–37.4)
MCV RBC AUTO: 81 FL (ref 82–98)
MONOCYTES # BLD AUTO: 0.42 THOUSAND/ÂΜL (ref 0.17–1.22)
MONOCYTES NFR BLD AUTO: 8 % (ref 4–12)
NEUTROPHILS # BLD AUTO: 2.8 THOUSANDS/ÂΜL (ref 1.85–7.62)
NEUTS SEG NFR BLD AUTO: 53 % (ref 43–75)
NRBC BLD AUTO-RTO: 0 /100 WBCS
PLATELET # BLD AUTO: 320 THOUSANDS/UL (ref 149–390)
PMV BLD AUTO: 10 FL (ref 8.9–12.7)
RBC # BLD AUTO: 4.3 MILLION/UL (ref 3.81–5.12)
TSH SERPL DL<=0.05 MIU/L-ACNC: 2.52 UIU/ML (ref 0.45–4.5)
WBC # BLD AUTO: 5.29 THOUSAND/UL (ref 4.31–10.16)

## 2023-04-04 DIAGNOSIS — E03.9 HYPOTHYROIDISM, UNSPECIFIED TYPE: ICD-10-CM

## 2023-04-04 LAB
HPV HR 12 DNA CVX QL NAA+PROBE: NEGATIVE
HPV16 DNA CVX QL NAA+PROBE: NEGATIVE
HPV18 DNA CVX QL NAA+PROBE: NEGATIVE

## 2023-04-04 RX ORDER — LEVOTHYROXINE SODIUM 0.15 MG/1
137 TABLET ORAL DAILY
Qty: 60 TABLET | Refills: 0 | Status: SHIPPED | OUTPATIENT
Start: 2023-04-04

## 2023-04-06 LAB
IRON SATN MFR SERPL: 8 % (ref 15–50)
IRON SERPL-MCNC: 37 UG/DL (ref 50–170)
TIBC SERPL-MCNC: 442 UG/DL (ref 250–450)

## 2023-04-07 LAB
LAB AP GYN PRIMARY INTERPRETATION: NORMAL
Lab: NORMAL

## 2023-04-24 ENCOUNTER — OFFICE VISIT (OUTPATIENT)
Dept: PHYSICAL THERAPY | Facility: REHABILITATION | Age: 53
End: 2023-04-24

## 2023-04-24 DIAGNOSIS — N81.10 CYSTOCELE WITH RECTOCELE: Primary | ICD-10-CM

## 2023-04-24 DIAGNOSIS — K59.00 CONSTIPATION, UNSPECIFIED CONSTIPATION TYPE: ICD-10-CM

## 2023-04-24 DIAGNOSIS — R10.2 PELVIC PAIN: ICD-10-CM

## 2023-04-24 DIAGNOSIS — N81.6 CYSTOCELE WITH RECTOCELE: Primary | ICD-10-CM

## 2023-04-24 NOTE — PROGRESS NOTES
"Daily Note     Today's date: 2023  Patient name: George Slaughter  : 1970  MRN: [id number]  Referring provider: GEE Mcginnis  Dx:   Encounter Diagnosis     ICD-10-CM    1  Cystocele with rectocele  N81 10     N81 6       2  Constipation, unspecified constipation type  K59 00       3  Pelvic pain  R10 2           Start Time: 1150  Stop Time: 1230  Total time in clinic (min): 40 minutes    Subjective: Patient reports feeling a little better over the last week  She had some discomfort for one or two days  She reports bowel movements were good over the last week  Objective: See treatment diary below      Assessment: Tolerated treatment well  Initiated plan of care this visit with a focus on diaphragmatic breathing and using diaphragmatic breathing for lengthening of pelvic floor muscles and to promote peristalsis  Also used diaphragm contraction for activation of deep abdominal muscles with patient presenting with good TrA isolation  Performed ILU massage this visit with good tolerance and provided patient with handout for stretches, pelvic floor relaxation and self ILU massage at home  Patient reports improved relaxation post-treatment  Patient exhibited good technique with therapeutic exercises and would benefit from continued PT  Plan: Continue per plan of care        Precautions: hemorrhoids, chronic constipation,  x2  Biofeedback Codes: COVERED  Goals: constipation, straining, pelvic pain/pressure, coordination/pressure management    Manuals            Pelvic floor muscle releases nv            ILU massage nv Done           Iliocecal valve induction nv Done                        Neuro Re-Ed             SEMG Biofeedback             Diaphragmatic breathing nv Done           DKTC w breathing nv 2'           Sole pose w breathing nv 2'           TA ADIM nv 10x5\"           PFMC Slow Holds ** nv           PFMC Quick Flicks ** nv           Seated PFMC             Seated PFMC with " pelvic tilt             Standing PFMC             Ther Ex             LTR             TA + bridge                          Ther Activity             Education Done Done           Constipation management nv            Toileting posture/defecation management nv                         PFMC + bridge             PFMC + sit to stand             PFMC + reaching **            PFMC + lift **            PFMC + cough             PFMC + step ups

## 2023-05-01 ENCOUNTER — OFFICE VISIT (OUTPATIENT)
Dept: PHYSICAL THERAPY | Facility: REHABILITATION | Age: 53
End: 2023-05-01

## 2023-05-01 DIAGNOSIS — R10.2 PELVIC PAIN: ICD-10-CM

## 2023-05-01 DIAGNOSIS — N81.10 CYSTOCELE WITH RECTOCELE: Primary | ICD-10-CM

## 2023-05-01 DIAGNOSIS — K59.00 CONSTIPATION, UNSPECIFIED CONSTIPATION TYPE: ICD-10-CM

## 2023-05-01 DIAGNOSIS — N81.6 CYSTOCELE WITH RECTOCELE: Primary | ICD-10-CM

## 2023-05-01 NOTE — PROGRESS NOTES
"Daily Note     Today's date: 2023  Patient name: Beth Benoit  : 1970  MRN: [id number]  Referring provider: GEE Tejeda  Dx:   Encounter Diagnosis     ICD-10-CM    1  Cystocele with rectocele  N81 10     N81 6       2  Constipation, unspecified constipation type  K59 00       3  Pelvic pain  R10 2           Start Time: 1150  Stop Time: 1233  Total time in clinic (min): 43 minutes    Subjective: Patient reports feeling \"better\" after leaving last time  She reports having a bowel movement every day but the stool is hard  She reports a feeling of vaginal \"pressure\" when vacuuming her stairs this weekend  Objective: See treatment diary below      Assessment: Tolerated treatment well  Performed pelvic floor nuscle contraction for activation of pelvic floor muscles to help with bladder support and improve pressure management  Will plan to incorporate activation of PFM with functional movements such as squatting/lifting that cause increased IAP  Also discussed toileting posture for improved muscle relaxation to avoid straining  Patient exhibited good technique with therapeutic exercises and would benefit from continued PT  Plan: Continue per plan of care        Precautions: hemorrhoids, chronic constipation,  x2  Biofeedback Codes: COVERED  Goals: constipation, straining, pelvic pain/pressure, coordination/pressure management    Manuals           Pelvic floor muscle releases nv            ILU massage nv Done Done          Iliocecal valve induction nv Done Done                       Neuro Re-Ed             SEMG Biofeedback             Diaphragmatic breathing nv Done Done          DKTC w breathing nv 2' 2'          Sole pose w breathing nv 2' 2'          TA ADIM nv 10x5\" 10x5\"          PFMC Slow Holds ** nv 10          PFMC Quick Flicks ** nv           Seated PFMC   nv          Standing PFMC   nv          Ther Ex             LTR             TA + bridge                        " Ther Activity             Education Done Done           Constipation management nv            Toileting posture/defecation management nv  Done                       PFMC + bridge             PFMC + squat   nv          PFMC + reaching **  nv          PFMC + lift **  nv          PFMC + cough             PFMC + step ups

## 2023-05-08 ENCOUNTER — OFFICE VISIT (OUTPATIENT)
Dept: FAMILY MEDICINE CLINIC | Facility: CLINIC | Age: 53
End: 2023-05-08

## 2023-05-08 ENCOUNTER — HOSPITAL ENCOUNTER (OUTPATIENT)
Dept: RADIOLOGY | Facility: HOSPITAL | Age: 53
Discharge: HOME | End: 2023-05-08

## 2023-05-08 VITALS
WEIGHT: 152 LBS | RESPIRATION RATE: 18 BRPM | SYSTOLIC BLOOD PRESSURE: 113 MMHG | TEMPERATURE: 97.8 F | HEIGHT: 64 IN | HEART RATE: 73 BPM | OXYGEN SATURATION: 100 % | BODY MASS INDEX: 25.95 KG/M2 | DIASTOLIC BLOOD PRESSURE: 71 MMHG

## 2023-05-08 DIAGNOSIS — L20.9 ATOPIC DERMATITIS, UNSPECIFIED TYPE: ICD-10-CM

## 2023-05-08 DIAGNOSIS — L25.9 CONTACT DERMATITIS AND ECZEMA: ICD-10-CM

## 2023-05-08 DIAGNOSIS — E03.9 HYPOTHYROIDISM, UNSPECIFIED TYPE: ICD-10-CM

## 2023-05-08 DIAGNOSIS — S99.921A FOOT INJURY, RIGHT, INITIAL ENCOUNTER: ICD-10-CM

## 2023-05-08 DIAGNOSIS — E55.9 VITAMIN D DEFICIENCY: ICD-10-CM

## 2023-05-08 DIAGNOSIS — D50.9 IRON DEFICIENCY ANEMIA, UNSPECIFIED IRON DEFICIENCY ANEMIA TYPE: Primary | ICD-10-CM

## 2023-05-08 DIAGNOSIS — Z13.6 SCREENING FOR CARDIOVASCULAR CONDITION: ICD-10-CM

## 2023-05-08 PROBLEM — M25.571 ACUTE RIGHT ANKLE PAIN: Status: ACTIVE | Noted: 2023-05-08

## 2023-05-08 RX ORDER — SODIUM CHLORIDE 9 MG/ML
20 INJECTION, SOLUTION INTRAVENOUS ONCE
Status: CANCELLED | OUTPATIENT
Start: 2023-05-19

## 2023-05-08 RX ORDER — CLOBETASOL PROPIONATE 0.5 MG/G
CREAM TOPICAL 2 TIMES DAILY
Qty: 30 G | Refills: 0 | Status: SHIPPED | OUTPATIENT
Start: 2023-05-08

## 2023-05-08 NOTE — PROGRESS NOTES
Name: Shanon Addison      : 1970      MRN: [id number]  Encounter Provider: Efrain Bar MD  Encounter Date: 2023   Encounter department: [address] [place] [address]     1  Iron deficiency anemia, unspecified iron deficiency anemia type  Assessment & Plan:  Noted to have low iron levels, unable to tolerate oral iron because of constipation  She is agreeable for IV infusions, orders placed today  We will follow-up in 3 months      2  Hypothyroidism, unspecified type  Assessment & Plan:  Patient is currently on levothyroxine 150 mcg daily, dose was recently increased  TSH in April was normal   We will check TSH again in 3 months  If normal, will need recheck in 1 year unless she develops symptoms of hypothyroidism  Orders:  -     TSH, 3rd generation; Future    3  Screening for cardiovascular condition  -     Comprehensive metabolic panel; Future  -     Lipid panel; Future    4  Vitamin D deficiency  -     Vitamin D 25 hydroxy; Future    5  Atopic dermatitis, unspecified type  Assessment & Plan:  Refill of clobetasol and Kenalog provided today  Advised moisturization of hands  Continue follow-up with dermatology    Orders:  -     triamcinolone (KENALOG) 0 1 % ointment; Apply topically 2 (two) times a day    6  Contact dermatitis and eczema  -     clobetasol (TEMOVATE) 0 05 % cream; Apply topically 2 (two) times a day    7  Foot injury, right, initial encounter  Assessment & Plan:  Tenderness noted in medial plantar aspect of right foot, will check x-ray of right foot  Advised to continue icing, may take Tylenol or Motrin as needed for pain  Orders:  -     XR foot 3+ vw right; Future; Expected date: 2023     Follow-up in 3 months for annual physical and review of labs  Toshia Corrigan presented to office for follow-up of labs  She was found to have low iron levels, and is not able to tolerate iron orally  Interested in iron infusions  She also reports that she twisted her ankle 4 days ago, and since then has been having pain in the plantar surface of her right foot and also on the heel  She has been icing her foot and using Tylenol for pain  She is worried about fractures  No external injury noted  Also requesting refill of ointment for eczema    Review of Systems   Constitutional: Negative for chills and fever  HENT: Negative for congestion  Respiratory: Negative for shortness of breath  Cardiovascular: Negative for chest pain  Gastrointestinal: Negative for diarrhea, nausea and vomiting  Genitourinary: Negative for difficulty urinating  Musculoskeletal:        Foot pain   Skin: Positive for rash  Neurological: Positive for headaches         Current Outpatient Medications on File Prior to Visit   Medication Sig   • cholecalciferol (VITAMIN D3) 1,000 units tablet Take 1 tablet (1,000 Units total) by mouth daily   • docusate sodium (COLACE) 100 mg capsule Take 1 capsule by mouth 2 (two) times a day as needed   • escitalopram (LEXAPRO) 5 mg tablet Take 1 tablet (5 mg total) by mouth daily   • fluticasone (FLONASE) 50 mcg/act nasal spray 1 spray into each nostril daily   • levothyroxine (Euthyrox) 150 mcg tablet Take 1 tablet (150 mcg total) by mouth daily   • loratadine (CLARITIN) 10 mg tablet Take 1 tablet (10 mg total) by mouth daily (Patient taking differently: Take 10 mg by mouth daily PRN)   • Multiple Vitamin (MULTIVITAMIN) capsule Take 1 capsule by mouth daily   • nystatin (MYCOSTATIN) cream Apply topically 2 (two) times a day   • polyethylene glycol (GLYCOLAX) 17 GM/SCOOP powder Take 17 g by mouth daily PRN   • psyllium (METAMUCIL SMOOTH TEXTURE) 28 % packet Take 1 packet by mouth 2 (two) times a day   • sodium chloride (OCEAN) 0 65 % nasal spray 1 spray into each nostril as needed for congestion or rhinitis (Patient taking differently: 1 spray into each nostril as needed for congestion or rhinitis PRN)   • [DISCONTINUED] "clobetasol (TEMOVATE) 0 05 % cream Apply topically 2 (two) times a day   • [DISCONTINUED] hydrocortisone 2 5 % cream Apply topically 2 (two) times a day   • [DISCONTINUED] triamcinolone (KENALOG) 0 1 % ointment Apply topically 2 (two) times a day       Objective     /71 (BP Location: Left arm, Patient Position: Sitting, Cuff Size: Standard)   Pulse 73   Temp 97 8 °F (36 6 °C) (Temporal)   Resp 18   Ht 5' 4\" (1 626 m)   Wt 68 9 kg (152 lb)   LMP 08/15/2020 (Approximate)   SpO2 100%   BMI 26 09 kg/m²     Physical Exam  Vitals reviewed  Constitutional:       Appearance: She is well-developed  HENT:      Head: Normocephalic and atraumatic  Right Ear: External ear normal       Left Ear: External ear normal    Eyes:      Conjunctiva/sclera: Conjunctivae normal    Cardiovascular:      Rate and Rhythm: Normal rate and regular rhythm  Heart sounds: Normal heart sounds  No murmur heard  No friction rub  Pulmonary:      Effort: Pulmonary effort is normal  No respiratory distress  Breath sounds: Normal breath sounds  No wheezing or rales  Musculoskeletal:         General: Normal range of motion  Cervical back: Normal range of motion and neck supple  Comments: Tenderness on right calcaneal prominence, medial plantar aspect of foot  No limitation of range of motion  Skin:     General: Skin is warm and dry  Comments: Scaly rash on dorsum of right hand   Neurological:      Mental Status: She is alert and oriented to person, place, and time         Lucila Germain MD  "

## 2023-05-08 NOTE — ASSESSMENT & PLAN NOTE
Refill of clobetasol and Kenalog provided today  Advised moisturization of hands    Continue follow-up with dermatology

## 2023-05-08 NOTE — ASSESSMENT & PLAN NOTE
Tenderness noted in medial plantar aspect of right foot, will check x-ray of right foot  Advised to continue icing, may take Tylenol or Motrin as needed for pain

## 2023-05-08 NOTE — ASSESSMENT & PLAN NOTE
Patient is currently on levothyroxine 150 mcg daily, dose was recently increased  TSH in April was normal   We will check TSH again in 3 months  If normal, will need recheck in 1 year unless she develops symptoms of hypothyroidism

## 2023-05-08 NOTE — ASSESSMENT & PLAN NOTE
Noted to have low iron levels, unable to tolerate oral iron because of constipation  She is agreeable for IV infusions, orders placed today    We will follow-up in 3 months

## 2023-05-09 ENCOUNTER — OFFICE VISIT (OUTPATIENT)
Dept: PHYSICAL THERAPY | Facility: REHABILITATION | Age: 53
End: 2023-05-09

## 2023-05-09 DIAGNOSIS — K59.00 CONSTIPATION, UNSPECIFIED CONSTIPATION TYPE: ICD-10-CM

## 2023-05-09 DIAGNOSIS — R10.2 PELVIC PAIN: ICD-10-CM

## 2023-05-09 DIAGNOSIS — N81.6 CYSTOCELE WITH RECTOCELE: Primary | ICD-10-CM

## 2023-05-09 DIAGNOSIS — N81.10 CYSTOCELE WITH RECTOCELE: Primary | ICD-10-CM

## 2023-05-09 NOTE — PROGRESS NOTES
"Daily Note     Today's date: 2023  Patient name: Creta Paget  : 1970  MRN: [id number]  Referring provider: GEE Becerra  Dx:   Encounter Diagnosis     ICD-10-CM    1  Cystocele with rectocele  N81 10     N81 6       2  Constipation, unspecified constipation type  K59 00       3  Pelvic pain  R10 2           Start Time: 8803  Stop Time: 4103  Total time in clinic (min): 43 minutes    Subjective: Patient reports overall she is feeling better  She had some constipation after eating pizza but was relieved with Colace  She reports a \"shaking\" feeling pointing the her adductors after mowing the lawn but denies vaginal or pelvic pain  Objective: See treatment diary below      Assessment: Tolerated treatment well  Patient reports difficulty with completion of pelvic floor contractions, especially in seated and standing positions  Trialed pelvic floor contractions with squatting to practice improved pressure management; patient had difficulty with overall coordination  Will continue to benefit from increased practice but educated patient on improved awareness of pelvic floor activation with ADLs for pressure management  Patient exhibited good technique with therapeutic exercises and would benefit from continued PT  Plan: Potential discharge next visit       Precautions: hemorrhoids, chronic constipation,  x2  Biofeedback Codes: COVERED  Goals: constipation, straining, pelvic pain/pressure, coordination/pressure management    Manuals          Pelvic floor muscle releases nv            ILU massage nv Done Done Done         Iliocecal valve induction nv Done Done Done                      Neuro Re-Ed             SEMG Biofeedback             Diaphragmatic breathing nv Done Done          DKTC w breathing nv 2' 2'          Sole pose w breathing nv 2' 2' 2'          Pball circles CW/CCW    20 ea         Pball adductor stretch    5x10\" b/l         TA ADIM nv 10x5\" 10x5\" 10x5\"       " PFMC Slow Holds ** nv 10 10         PFMC Quick Flicks ** nv           Seated PFMC   nv 10         Standing PFMC   nv 10         Ther Ex             LTR             TA + bridge                          Ther Activity             Education Done Done           Constipation management nv            Toileting posture/defecation management nv  Done                       PFMC + bridge             PFMC + squat   nv 15 coordination         PFMC + reaching **  nv          PFMC + lift **  nv          PFMC + cough             PFMC + step ups

## 2023-05-12 ENCOUNTER — TELEPHONE (OUTPATIENT)
Dept: FAMILY MEDICINE CLINIC | Facility: CLINIC | Age: 53
End: 2023-05-12

## 2023-05-12 NOTE — TELEPHONE ENCOUNTER
----- Message from Ambar Goldstein MD sent at 5/8/2023  8:57 AM EDT -----  Please schedule iron infusions for this patient, I have placed orders  She prefers [facility]   Thanks

## 2023-05-12 NOTE — TELEPHONE ENCOUNTER
Called infusion center, patient scheduled Fri May 19, 2023 @ 10:15am, [address], [address]   Tried calling patient, left message with information

## 2023-05-15 ENCOUNTER — OFFICE VISIT (OUTPATIENT)
Dept: PHYSICAL THERAPY | Facility: REHABILITATION | Age: 53
End: 2023-05-15

## 2023-05-15 DIAGNOSIS — N81.10 CYSTOCELE WITH RECTOCELE: Primary | ICD-10-CM

## 2023-05-15 DIAGNOSIS — R10.2 PELVIC PAIN: ICD-10-CM

## 2023-05-15 DIAGNOSIS — K59.00 CONSTIPATION, UNSPECIFIED CONSTIPATION TYPE: ICD-10-CM

## 2023-05-15 DIAGNOSIS — N81.6 CYSTOCELE WITH RECTOCELE: Primary | ICD-10-CM

## 2023-05-15 NOTE — PROGRESS NOTES
"Daily Note     Today's date: 5/15/2023  Patient name: Ab Moss  : 1970  MRN: [id number]  Referring provider: GEE Feliciano  Dx:   Encounter Diagnosis     ICD-10-CM    1  Cystocele with rectocele  N81 10     N81 6       2  Constipation, unspecified constipation type  K59 00       3  Pelvic pain  R10 2           Start Time: 486  Stop Time: 69  Total time in clinic (min): 49 minutes    Subjective: Patient reports feeling \"better\" but was not consistent with completing her exercises over the last few weeks  She reports constipation with eating certain foods but is able to manage it and avoid straining  She reports some feelings of \"vaginal irritation\" but not pain  Objective: See treatment diary below      Assessment: Tolerated treatment well  Initiated TA for practice of pelvic floor engagement with functional activities such as lifting  Patient demonstrates difficulty with coordination and form and will benefit from continued practice  Educated patient on awareness of these during ADLs for pressure management and prevention of worsening of prolapse  Patient demonstrated fatigue post treatment, exhibited good technique with therapeutic exercises and would benefit from continued PT  Plan: Continue per plan of care  Precautions: hemorrhoids, chronic constipation,  x2  Biofeedback Codes: COVERED  Goals: constipation, straining, pelvic pain/pressure, coordination/pressure management    Manuals 4/17 4/24 5/1 5/9 5/15        Pelvic floor muscle releases nv    nv?         ILU massage nv Done Done Done Done        Iliocecal valve induction nv Done Done Done                      Neuro Re-Ed             SEMG Biofeedback             Diaphragmatic breathing nv Done Done          DKTC w breathing nv 2' 2'          Sole pose w breathing nv 2' 2' 2'  2'        Pball circles CW/CCW    20 ea 20 ea        Pball adductor stretch    5x10\" b/l 5x10\" b/l        TA ADIM nv 10x5\" 10x5\" 10x5\" +PFMC  10x  " PFMC Slow Holds ** nv 10 10         PFMC Quick Flicks ** nv           Seated PFMC   nv 10 10x        Standing PFMC   nv 10 10x        Ther Ex             LTR             TA + bridge                          Ther Activity             Education Done Done   Done        Constipation management nv            Toileting posture/defecation management nv  Done                       PFMC + bridge             PFMC + squat   nv 15 coordination         PFMC + lifting up **  nv  10# 5x        PFMC + lifting from floor **  nv  10# 5x        PFMC + cough             PFMC + step ups

## 2023-05-18 ENCOUNTER — TELEPHONE (OUTPATIENT)
Dept: INFUSION CENTER | Facility: CLINIC | Age: 53
End: 2023-05-18

## 2023-05-19 ENCOUNTER — HOSPITAL ENCOUNTER (OUTPATIENT)
Dept: INFUSION CENTER | Facility: CLINIC | Age: 53
End: 2023-05-19

## 2023-05-19 VITALS
OXYGEN SATURATION: 99 % | SYSTOLIC BLOOD PRESSURE: 126 MMHG | TEMPERATURE: 98 F | HEART RATE: 67 BPM | DIASTOLIC BLOOD PRESSURE: 82 MMHG | RESPIRATION RATE: 18 BRPM

## 2023-05-19 DIAGNOSIS — D50.9 IRON DEFICIENCY ANEMIA, UNSPECIFIED IRON DEFICIENCY ANEMIA TYPE: Primary | ICD-10-CM

## 2023-05-19 RX ORDER — SODIUM CHLORIDE 9 MG/ML
20 INJECTION, SOLUTION INTRAVENOUS ONCE
Status: CANCELLED | OUTPATIENT
Start: 2023-05-26

## 2023-05-19 RX ORDER — SODIUM CHLORIDE 9 MG/ML
20 INJECTION, SOLUTION INTRAVENOUS ONCE
Status: COMPLETED | OUTPATIENT
Start: 2023-05-19 | End: 2023-05-19

## 2023-05-19 RX ADMIN — SODIUM CHLORIDE 100 MG: 9 INJECTION, SOLUTION INTRAVENOUS at 10:28

## 2023-05-19 RX ADMIN — SODIUM CHLORIDE 20 ML/HR: 0.9 INJECTION, SOLUTION INTRAVENOUS at 10:27

## 2023-05-19 NOTE — PROGRESS NOTES
Patient tolerated treatment today without complications  Patient verified upcoming appointment and AVS provided

## 2023-05-19 NOTE — PROGRESS NOTES
Patient arrives to infusion center for 1st dose Venofer today  PIV inserted without issue, patient tolerated well  Patient resting on recliner chair - call bell within reach

## 2023-05-23 ENCOUNTER — HOSPITAL ENCOUNTER (OUTPATIENT)
Dept: INFUSION CENTER | Facility: CLINIC | Age: 53
Discharge: HOME | End: 2023-05-23

## 2023-05-23 VITALS
OXYGEN SATURATION: 100 % | TEMPERATURE: 97.8 F | RESPIRATION RATE: 18 BRPM | SYSTOLIC BLOOD PRESSURE: 119 MMHG | DIASTOLIC BLOOD PRESSURE: 77 MMHG | HEART RATE: 58 BPM

## 2023-05-23 DIAGNOSIS — D50.9 IRON DEFICIENCY ANEMIA, UNSPECIFIED IRON DEFICIENCY ANEMIA TYPE: Primary | ICD-10-CM

## 2023-05-23 RX ORDER — SODIUM CHLORIDE 9 MG/ML
20 INJECTION, SOLUTION INTRAVENOUS ONCE
Status: CANCELLED | OUTPATIENT
Start: 2023-05-26

## 2023-05-23 RX ORDER — SODIUM CHLORIDE 9 MG/ML
20 INJECTION, SOLUTION INTRAVENOUS ONCE
OUTPATIENT
Start: 2023-05-30

## 2023-05-23 RX ORDER — SODIUM CHLORIDE 9 MG/ML
20 INJECTION, SOLUTION INTRAVENOUS ONCE
Status: COMPLETED | OUTPATIENT
Start: 2023-05-23 | End: 2023-05-23

## 2023-05-23 RX ADMIN — SODIUM CHLORIDE 100 MG: 9 INJECTION, SOLUTION INTRAVENOUS at 09:14

## 2023-05-23 RX ADMIN — SODIUM CHLORIDE 20 ML/HR: 0.9 INJECTION, SOLUTION INTRAVENOUS at 09:14

## 2023-05-23 NOTE — PROGRESS NOTES
Pt here for venofer infusion  Pt offered no complaints today  Physician Dr Augusto Abreu as messaged related to plan date  Pt had venofer 5/19/23  Laurie Beckman for pt to have early today and will change date  Then after pt to be scheduled every 7 days

## 2023-05-24 ENCOUNTER — OFFICE VISIT (OUTPATIENT)
Dept: PHYSICAL THERAPY | Facility: REHABILITATION | Age: 53
End: 2023-05-24

## 2023-05-24 DIAGNOSIS — N81.6 CYSTOCELE WITH RECTOCELE: Primary | ICD-10-CM

## 2023-05-24 DIAGNOSIS — K59.00 CONSTIPATION, UNSPECIFIED CONSTIPATION TYPE: ICD-10-CM

## 2023-05-24 DIAGNOSIS — N81.10 CYSTOCELE WITH RECTOCELE: Primary | ICD-10-CM

## 2023-05-24 DIAGNOSIS — R10.2 PELVIC PAIN: ICD-10-CM

## 2023-05-24 NOTE — PROGRESS NOTES
"Daily Note     Today's date: 2023  Patient name: Raymond Peter  : 1970  MRN: [id number]  Referring provider: GEE March  Dx:   Encounter Diagnosis     ICD-10-CM    1  Cystocele with rectocele  N81 10     N81 6       2  Constipation, unspecified constipation type  K59 00       3  Pelvic pain  R10 2           Start Time:   Stop Time:   Total time in clinic (min): 47 minutes    Subjective: Patient reports feeling good overall  She received an iron infusion yesterday and feels she is constipated  She had two solid bowel movements this morning but feels she has more to empty  She reports left sided back pain that she can get while she is constipated but also lifted a heavy case of water at the store yesterday  She reports some vaginal \"discomfort\" describing it as \"tightness  \"      Objective: See treatment diary below      Assessment: Tolerated treatment well  Initiated additional TE for pelvic girdle stability and acitvation with good tolerance  Also initiated pelvic floor muscles releases with mild tension noted bilaterally especially at puborectalis  Increased tenderness on left compared to right  Educated patient on doing body scans/checking in throughout the day to let go of tension in the pelvic floor muscles  Re-assessed prolapse with mild anterior and posterior descent to level of hymen  Continues to demonstrate weakness in the pelvic floor muscles  Patient demonstrated fatigue post treatment, exhibited good technique with therapeutic exercises and would benefit from continued PT  Plan: Continue per plan of care  Precautions: hemorrhoids, chronic constipation,  x2  Biofeedback Codes: COVERED  Goals: constipation, straining, pelvic pain/pressure, coordination/pressure management    Manuals 4/17 4/24 5/1 5/9 5/15 5/24       Pelvic floor muscle releases nv    nv?  Done       ILU massage nv Done Done Done Done        Iliocecal valve induction nv Done Done Done                 " "     Neuro Re-Ed             SEMG Biofeedback             Diaphragmatic breathing nv Done Done          DKTC w breathing nv 2' 2'          Sole pose w breathing nv 2' 2' 2'  2' 2'       Pball circles CW/CCW    20 ea 20 ea        Pball adductor stretch    5x10\" b/l 5x10\" b/l nv       TA ADIM nv 10x5\" 10x5\" 10x5\" +PFMC  10x +PFMC 10x       PFMC Slow Holds ** nv 10 10         PFMC Quick Flicks ** nv           Seated PFMC   nv 10 10x        Standing PFMC   nv 10 10x        Ther Ex             TA   + PFMC + hip abd iso      GTB 10x5\"       TA   + PFMC + hip add iso      10x5\"       LTR             TA + bridge                          Ther Activity             Education Done Done   Done Done       Constipation management nv            Toileting posture/defecation management nv  Done                       PFMC + bridge             PFMC + squat   nv 15 coordination         PFMC + lifting up **  nv  10# 5x        PFMC + lifting from floor **  nv  10# 5x        PFMC + cough             PFMC + step ups                                           "

## 2023-05-31 ENCOUNTER — OFFICE VISIT (OUTPATIENT)
Dept: PHYSICAL THERAPY | Facility: REHABILITATION | Age: 53
End: 2023-05-31

## 2023-05-31 DIAGNOSIS — N81.6 CYSTOCELE WITH RECTOCELE: Primary | ICD-10-CM

## 2023-05-31 DIAGNOSIS — N81.10 CYSTOCELE WITH RECTOCELE: Primary | ICD-10-CM

## 2023-05-31 DIAGNOSIS — R10.2 PELVIC PAIN: ICD-10-CM

## 2023-05-31 DIAGNOSIS — K59.00 CONSTIPATION, UNSPECIFIED CONSTIPATION TYPE: ICD-10-CM

## 2023-05-31 NOTE — PROGRESS NOTES
"Daily Note     Today's date: 2023  Patient name: Taylor Mendoza  : 1970  MRN: [id number]  Referring provider: GEE Ayers  Dx:   Encounter Diagnosis     ICD-10-CM    1  Cystocele with rectocele  N81 10     N81 6       2  Constipation, unspecified constipation type  K59 00       3  Pelvic pain  R10 2           Start Time: 334  Stop Time: 406  Total time in clinic (min): 48 minutes    Subjective: Patient reports some pelvic pain at start of session stating \"just driving over here I could feel it and now I feel it more  \"  She reports discomfort as \"pinching  \" Patient reports constipation as \"fine  \" She reports no complaints after previous session  Objective: See treatment diary below      Assessment: Tolerated treatment well  Patient demonstrated fatigue post treatment, exhibited good technique with therapeutic exercises and would benefit from continued PT Patient tolerated all TE performed today well, initial verbal cues required for hip abd/add with TA+PFMC for proper coordination and breathing  Patient given updated HEP reporting understanding  Patient tolerated manuals well, bilateral tension noted with manuals, with patient self reporting increased tenderness on right side layer one  Patient reporting relief at end of session today  Plan: Continue per plan of care  Progress treatment as tolerated  Precautions: hemorrhoids, chronic constipation,  x2  Biofeedback Codes: COVERED  Goals: constipation, straining, pelvic pain/pressure, coordination/pressure management    Manuals 4/17 4/24 5/1 5/9 5/15 5/24 5/31      Pelvic floor muscle releases nv    nv?  Done Done       ILU massage nv Done Done Done Done        Iliocecal valve induction nv Done Done Done                      Neuro Re-Ed             SEMG Biofeedback             Diaphragmatic breathing nv Done Done          DKTC w breathing nv 2' 2'          Sole pose w breathing nv 2' 2' 2'  2' 2' 2'      Pball circles CW/CCW  " "  20 ea 20 ea        Pball adductor stretch    5x10\" b/l 5x10\" b/l nv nv      TA ADIM nv 10x5\" 10x5\" 10x5\" +PFMC  10x +PFMC 10x +PFMC 10x      PFMC Slow Holds ** nv 10 10         PFMC Quick Flicks ** nv           Seated PFMC   nv 10 10x        Standing PFMC   nv 10 10x        Ther Ex             TA   + PFMC + hip abd iso      GTB 10x5\" GTB 10x5''      TA   + PFMC + hip add iso      10x5\" 10x5''      LTR             TA + bridge                          Ther Activity             Education Done Done   Done Done Done       Constipation management nv            Toileting posture/defecation management nv  Done                       PFMC + bridge             PFMC + squat   nv 15 coordination         PFMC + lifting up **  nv  10# 5x        PFMC + lifting from floor **  nv  10# 5x        PFMC + cough             PFMC + step ups                                             "

## 2023-06-06 DIAGNOSIS — D50.9 IRON DEFICIENCY ANEMIA, UNSPECIFIED IRON DEFICIENCY ANEMIA TYPE: ICD-10-CM

## 2023-06-06 DIAGNOSIS — E03.9 HYPOTHYROIDISM, UNSPECIFIED TYPE: ICD-10-CM

## 2023-06-06 DIAGNOSIS — J30.89 ALLERGIC RHINITIS DUE TO OTHER ALLERGIC TRIGGER, UNSPECIFIED SEASONALITY: Primary | ICD-10-CM

## 2023-06-06 RX ORDER — FLUTICASONE PROPIONATE 50 MCG
1 SPRAY, SUSPENSION (ML) NASAL DAILY
Qty: 9.9 ML | Refills: 0 | Status: SHIPPED | OUTPATIENT
Start: 2023-06-06

## 2023-06-06 RX ORDER — LEVOTHYROXINE SODIUM 0.15 MG/1
137 TABLET ORAL DAILY
Qty: 60 TABLET | Refills: 0 | Status: SHIPPED | OUTPATIENT
Start: 2023-06-06

## 2023-06-07 RX ORDER — SODIUM CHLORIDE 9 MG/ML
20 INJECTION, SOLUTION INTRAVENOUS ONCE
Status: CANCELLED | OUTPATIENT
Start: 2023-06-09

## 2023-06-09 ENCOUNTER — HOSPITAL ENCOUNTER (OUTPATIENT)
Dept: INFUSION CENTER | Facility: CLINIC | Age: 53
End: 2023-06-09
Payer: COMMERCIAL

## 2023-06-09 ENCOUNTER — OFFICE VISIT (OUTPATIENT)
Dept: PHYSICAL THERAPY | Facility: REHABILITATION | Age: 53
End: 2023-06-09
Payer: COMMERCIAL

## 2023-06-09 VITALS
RESPIRATION RATE: 18 BRPM | OXYGEN SATURATION: 99 % | HEART RATE: 72 BPM | TEMPERATURE: 98 F | SYSTOLIC BLOOD PRESSURE: 112 MMHG | DIASTOLIC BLOOD PRESSURE: 67 MMHG

## 2023-06-09 DIAGNOSIS — N81.10 CYSTOCELE WITH RECTOCELE: Primary | ICD-10-CM

## 2023-06-09 DIAGNOSIS — N81.6 CYSTOCELE WITH RECTOCELE: Primary | ICD-10-CM

## 2023-06-09 DIAGNOSIS — D50.9 IRON DEFICIENCY ANEMIA, UNSPECIFIED IRON DEFICIENCY ANEMIA TYPE: Primary | ICD-10-CM

## 2023-06-09 DIAGNOSIS — K59.00 CONSTIPATION, UNSPECIFIED CONSTIPATION TYPE: ICD-10-CM

## 2023-06-09 DIAGNOSIS — R10.2 PELVIC PAIN: ICD-10-CM

## 2023-06-09 PROCEDURE — 96365 THER/PROPH/DIAG IV INF INIT: CPT

## 2023-06-09 PROCEDURE — 97140 MANUAL THERAPY 1/> REGIONS: CPT | Performed by: PHYSICAL THERAPIST

## 2023-06-09 PROCEDURE — 97110 THERAPEUTIC EXERCISES: CPT | Performed by: PHYSICAL THERAPIST

## 2023-06-09 PROCEDURE — 97112 NEUROMUSCULAR REEDUCATION: CPT | Performed by: PHYSICAL THERAPIST

## 2023-06-09 RX ORDER — SODIUM CHLORIDE 9 MG/ML
20 INJECTION, SOLUTION INTRAVENOUS ONCE
Status: CANCELLED | OUTPATIENT
Start: 2023-06-16

## 2023-06-09 RX ORDER — SODIUM CHLORIDE 9 MG/ML
20 INJECTION, SOLUTION INTRAVENOUS ONCE
Status: COMPLETED | OUTPATIENT
Start: 2023-06-09 | End: 2023-06-09

## 2023-06-09 RX ADMIN — SODIUM CHLORIDE 100 MG: 9 INJECTION, SOLUTION INTRAVENOUS at 14:51

## 2023-06-09 RX ADMIN — SODIUM CHLORIDE 20 ML/HR: 0.9 INJECTION, SOLUTION INTRAVENOUS at 14:45

## 2023-06-09 NOTE — PROGRESS NOTES
"Daily Note     Today's date: 2023  Patient name: Madina Anne  : 1970  MRN: [id number]  Referring provider: GEE Barroso  Dx:   Encounter Diagnosis     ICD-10-CM    1  Cystocele with rectocele  N81 10     N81 6       2  Constipation, unspecified constipation type  K59 00       3  Pelvic pain  R10 2           Start Time:   Stop Time:   Total time in clinic (min): 45 minutes    Subjective: Patient reports sometimes the pelvic pressure is really good but sometimes it can be worse  She felt better after last visit but had some vaginal discomfort after doing yardwork  She did some exercises and massage which improved discomfort  Objective: See treatment diary below      Assessment: Tolerated treatment well  Improvement in pelvic floor muscle tone noted bilaterally, some discomfort on the left side  Initiated additional pelvic floor and pelvic girdle activation this visit with good tolerance  Patient exhibited good technique with therapeutic exercises and would benefit from continued PT  Plan: Continue per plan of care  Precautions: hemorrhoids, chronic constipation,  x2  Biofeedback Codes: COVERED  Goals: constipation, straining, pelvic pain/pressure, coordination/pressure management    Manuals 4/17 4/24 5/1 5/9 5/15 5/24 5/31 6/9     Pelvic floor muscle releases nv    nv?  Done Done  Done     ILU massage nv Done Done Done Done        Iliocecal valve induction nv Done Done Done                      Neuro Re-Ed             SEMG Biofeedback             Diaphragmatic breathing nv Done Done          DKTC w breathing nv 2' 2'          Sole pose w breathing nv 2' 2' 2'  2' 2' 2'      Pball circles CW/CCW    20 ea 20 ea   20 ea     Pball adductor stretch    5x10\" b/l 5x10\" b/l nv nv 5x10\" b/l     TA ADIM nv 10x5\" 10x5\" 10x5\" +PFMC  10x +PFMC 10x +PFMC 10x +PFMC 10x     PFMC Slow Holds ** nv 10 10         PFMC Quick Flicks ** nv           Seated PFMC   nv 10 10x        Standing PFMC  " " nv 10 10x        Ther Ex             TA   + PFMC + hip abd iso      GTB 10x5\" GTB 10x5'' GTB 10x5\"     TA   + PFMC + hip add iso      10x5\" 10x5'' 10x5\"     LTR             TA + bridge        10x                  Ther Activity             Education Done Done   Done Done Done       Constipation management nv            Toileting posture/defecation management nv  Done                       PFMC + bridge             PFMC + squat   nv 15 coordination         PFMC + lifting up **  nv  10# 5x        PFMC + lifting from floor **  nv  10# 5x        PFMC + cough             PFMC + step ups                                               "

## 2023-06-09 NOTE — PROGRESS NOTES
Patient here for Venofer, tolerated infusion without complications   Patient will make next appointment on her way out, declined AVS

## 2023-06-12 ENCOUNTER — APPOINTMENT (OUTPATIENT)
Dept: PHYSICAL THERAPY | Facility: REHABILITATION | Age: 53
End: 2023-06-12
Payer: COMMERCIAL

## 2023-06-15 ENCOUNTER — OFFICE VISIT (OUTPATIENT)
Dept: PHYSICAL THERAPY | Facility: REHABILITATION | Age: 53
End: 2023-06-15
Payer: COMMERCIAL

## 2023-06-15 DIAGNOSIS — N81.10 CYSTOCELE WITH RECTOCELE: Primary | ICD-10-CM

## 2023-06-15 DIAGNOSIS — R10.2 PELVIC PAIN: ICD-10-CM

## 2023-06-15 DIAGNOSIS — N81.6 CYSTOCELE WITH RECTOCELE: Primary | ICD-10-CM

## 2023-06-15 DIAGNOSIS — K59.00 CONSTIPATION, UNSPECIFIED CONSTIPATION TYPE: ICD-10-CM

## 2023-06-15 PROCEDURE — 97112 NEUROMUSCULAR REEDUCATION: CPT

## 2023-06-15 PROCEDURE — 97110 THERAPEUTIC EXERCISES: CPT

## 2023-06-15 PROCEDURE — 97140 MANUAL THERAPY 1/> REGIONS: CPT

## 2023-06-15 NOTE — PROGRESS NOTES
"Daily Note     Today's date: 6/15/2023  Patient name: Mirella Salazar  : 1970  MRN: [id number]  Referring provider: GEE Abarca  Dx:   Encounter Diagnosis     ICD-10-CM    1  Cystocele with rectocele  N81 10     N81 6       2  Constipation, unspecified constipation type  K59 00       3  Pelvic pain  R10 2           Start Time: 77  Stop Time: 1225  Total time in clinic (min): 55 minutes    Subjective: Patient reports overall the pelvic pressure and pain has improved  She states \"pressure sometimes, for a couple of days  \" She reports she has had BM every day however feels constipated at times  She reports she has been getting infusions which she feels as if that may be contributing to that feeling  Patient reporting some soreness after previous session  Objective: See treatment diary below      Assessment: Tolerated treatment well  Patient demonstrated fatigue post treatment, exhibited good technique with therapeutic exercises and would benefit from continued PT Tolerated manuals well this session, improvements noted with tightness bilaterally with manuals today, patient self reporting more tightness with left side versus right  Tolerated all TE performed well, most challenge noted with bridges, will continue to trial functional activities next visit as able  Plan: Continue per plan of care  Progress treatment as tolerated  Precautions: hemorrhoids, chronic constipation,  x2  Biofeedback Codes: COVERED  Goals: constipation, straining, pelvic pain/pressure, coordination/pressure management    Manuals 4/17 4/24 5/1 5/9 5/15 5/24 5/31 6/9 6/15    Pelvic floor muscle releases nv    nv?  Done Done  Done Done     ILU massage nv Done Done Done Done        Iliocecal valve induction nv Done Done Done                      Neuro Re-Ed             SEMG Biofeedback             Diaphragmatic breathing nv Done Done          DKTC w breathing nv 2' 2'          Sole pose w breathing nv 2' 2' 2'  " "2' 2' 2'      Pball circles CW/CCW    20 ea 20 ea   20 ea 20 ea    Pball adductor stretch    5x10\" b/l 5x10\" b/l nv nv 5x10\" b/l 5x10'' b/l    TA ADIM nv 10x5\" 10x5\" 10x5\" +PFMC  10x +PFMC 10x +PFMC 10x +PFMC 10x +PFMC 10x    PFMC Slow Holds ** nv 10 10         PFMC Quick Flicks ** nv           Seated PFMC   nv 10 10x        Standing PFMC   nv 10 10x        Ther Ex             TA   + PFMC + hip abd iso      GTB 10x5\" GTB 10x5'' GTB 10x5\" GTB 10x5''    TA   + PFMC + hip add iso      10x5\" 10x5'' 10x5\" 10x5''    LTR             TA + bridge        10x 2x10                  Ther Activity             Education Done Done   Done Done Done       Constipation management nv            Toileting posture/defecation management nv  Done                       PFMC + bridge             PFMC + squat   nv 15 coordination     nv    PFMC + lifting up **  nv  10# 5x    nv    PFMC + lifting from floor **  nv  10# 5x    nv    PFMC + cough             PFMC + step ups                                                 "

## 2023-06-16 ENCOUNTER — HOSPITAL ENCOUNTER (OUTPATIENT)
Dept: INFUSION CENTER | Facility: CLINIC | Age: 53
End: 2023-06-16

## 2023-06-21 ENCOUNTER — OFFICE VISIT (OUTPATIENT)
Dept: PHYSICAL THERAPY | Facility: REHABILITATION | Age: 53
End: 2023-06-21
Payer: COMMERCIAL

## 2023-06-21 DIAGNOSIS — N81.6 CYSTOCELE WITH RECTOCELE: Primary | ICD-10-CM

## 2023-06-21 DIAGNOSIS — N81.10 CYSTOCELE WITH RECTOCELE: Primary | ICD-10-CM

## 2023-06-21 DIAGNOSIS — R10.2 PELVIC PAIN: ICD-10-CM

## 2023-06-21 DIAGNOSIS — K59.00 CONSTIPATION, UNSPECIFIED CONSTIPATION TYPE: ICD-10-CM

## 2023-06-21 PROCEDURE — 97140 MANUAL THERAPY 1/> REGIONS: CPT

## 2023-06-21 PROCEDURE — 97112 NEUROMUSCULAR REEDUCATION: CPT

## 2023-06-21 PROCEDURE — 97530 THERAPEUTIC ACTIVITIES: CPT

## 2023-06-21 RX ORDER — SODIUM CHLORIDE 9 MG/ML
20 INJECTION, SOLUTION INTRAVENOUS ONCE
Status: CANCELLED | OUTPATIENT
Start: 2023-06-24

## 2023-06-21 NOTE — PROGRESS NOTES
"Daily Note     Today's date: 2023  Patient name: Marigene Dakin  : 1970  MRN: [id number]  Referring provider: GEE Maria  Dx:   Encounter Diagnosis     ICD-10-CM    1  Cystocele with rectocele  N81 10     N81 6       2  Constipation, unspecified constipation type  K59 00       3  Pelvic pain  R10 2           Start Time: 9302  Stop Time: 1450  Total time in clinic (min): 49 minutes    Subjective: Patient reports \"the last few days I have been really good  \" She does reports some increased pressure/discomfort on Saturday stating \"maybe I was doing too much exercise  \"       Objective: See treatment diary below      Assessment: Tolerated treatment well  Patient demonstrated fatigue post treatment, exhibited good technique with therapeutic exercises and would benefit from continued PT Patient tolerated manuals well, left sided tenderness noted more so than right  Tolerated all TE performed today well, cues required for proper coordination of breathing and PFMC especially with sit to stands  Plan: Continue per plan of care  Progress treatment as tolerated  Precautions: hemorrhoids, chronic constipation,  x2  Biofeedback Codes: COVERED  Goals: constipation, straining, pelvic pain/pressure, coordination/pressure management    Manuals 4/17 4/24 5/1 5/9 5/15 5/24 5/31 6/9 6/15 6/21   Pelvic floor muscle releases nv    nv?  Done Done  Done Done  Done    ILU massage nv Done Done Done Done        Iliocecal valve induction nv Done Done Done                      Neuro Re-Ed             SEMG Biofeedback             Diaphragmatic breathing nv Done Done          DKTC w breathing nv 2' 2'          Sole pose w breathing nv 2' 2' 2'  2' 2' 2'      Pball circles CW/CCW    20 ea 20 ea   20 ea 20 ea    Pball adductor stretch    5x10\" b/l 5x10\" b/l nv nv 5x10\" b/l 5x10'' b/l    TA ADIM nv 10x5\" 10x5\" 10x5\" +PFMC  10x +PFMC 10x +PFMC 10x +PFMC 10x +PFMC 10x +PFMC 10x   PFMC Slow Holds ** nv 10 10       " "  PFMC Quick Flicks ** nv           Seated PFMC   nv 10 10x     5x   Standing PFMC   nv 10 10x     5x   Ther Ex             TA   + PFMC + hip abd iso      GTB 10x5\" GTB 10x5'' GTB 10x5\" GTB 10x5''    TA   + PFMC + hip add iso      10x5\" 10x5'' 10x5\" 10x5''    LTR             TA + bridge        10x 2x10  2x10                Ther Activity             Education Done Done   Done Done Done       Constipation management nv            Toileting posture/defecation management nv  Done                       PFMC + bridge             PFMC + squat   nv 15 coordination     nv Sit to stand 10x   PFMC + lifting up **  nv  10# 5x    nv 10# 10x   PFMC + lifting from floor **  nv  10# 5x    nv 10# 5x   PFMC + cough             PFMC + step ups                                                   "

## 2023-06-24 ENCOUNTER — HOSPITAL ENCOUNTER (OUTPATIENT)
Dept: INFUSION CENTER | Facility: CLINIC | Age: 53
End: 2023-06-24

## 2023-06-28 ENCOUNTER — OFFICE VISIT (OUTPATIENT)
Dept: PHYSICAL THERAPY | Facility: REHABILITATION | Age: 53
End: 2023-06-28
Payer: COMMERCIAL

## 2023-06-28 DIAGNOSIS — N81.6 CYSTOCELE WITH RECTOCELE: Primary | ICD-10-CM

## 2023-06-28 DIAGNOSIS — N81.10 CYSTOCELE WITH RECTOCELE: Primary | ICD-10-CM

## 2023-06-28 DIAGNOSIS — R10.2 PELVIC PAIN: ICD-10-CM

## 2023-06-28 DIAGNOSIS — K59.00 CONSTIPATION, UNSPECIFIED CONSTIPATION TYPE: ICD-10-CM

## 2023-06-28 DIAGNOSIS — D50.9 IRON DEFICIENCY ANEMIA, UNSPECIFIED IRON DEFICIENCY ANEMIA TYPE: Primary | ICD-10-CM

## 2023-06-28 PROCEDURE — 97110 THERAPEUTIC EXERCISES: CPT | Performed by: PHYSICAL THERAPIST

## 2023-06-28 PROCEDURE — 97140 MANUAL THERAPY 1/> REGIONS: CPT | Performed by: PHYSICAL THERAPIST

## 2023-06-28 PROCEDURE — 97112 NEUROMUSCULAR REEDUCATION: CPT | Performed by: PHYSICAL THERAPIST

## 2023-06-28 RX ORDER — SODIUM CHLORIDE 9 MG/ML
20 INJECTION, SOLUTION INTRAVENOUS ONCE
Status: CANCELLED | OUTPATIENT
Start: 2023-07-01

## 2023-06-28 NOTE — PROGRESS NOTES
"Daily Note     Today's date: 2023  Patient name: Ryan Butterfield  : 1970  MRN: [id number]  Referring provider: GEE Levin  Dx:   Encounter Diagnosis     ICD-10-CM    1  Cystocele with rectocele  N81 10     N81 6       2  Constipation, unspecified constipation type  K59 00       3  Pelvic pain  R10 2           Start Time: 7964  Stop Time: 09  Total time in clinic (min): 46 minutes    Subjective: Patient reports some constipation after her iron infusion  She reports overall feeling good  When she gets discomfort or pressure vaginally she does the exercises which help  Objective: See treatment diary below      Assessment: Tolerated treatment well  Improvement noted in terms of decreased tenderness and tension bilaterally with manual muscle releases this visit  Patient and PT in mutual agreement to drop frequency of appointments to bi-weekly with compliance to HEP  Patient demonstrated fatigue post treatment, exhibited good technique with therapeutic exercises and would benefit from continued PT  Plan: Continue per plan of care  Precautions: hemorrhoids, chronic constipation,  x2  Biofeedback Codes: COVERED  Goals: constipation, straining, pelvic pain/pressure, coordination/pressure management    Manuals 4/17 4/24 5/1 5/9 5/15 5/24 5/31 6/9 6/15 6/21 6/28   Pelvic floor muscle releases nv    nv?  Done Done  Done Done  Done  Done   ILU massage nv Done Done Done Done         Iliocecal valve induction nv Done Done Done                        Neuro Re-Ed              SEMG Biofeedback              Diaphragmatic breathing nv Done Done           DKTC w breathing nv 2' 2'           Sole pose w breathing nv 2' 2' 2'  2' 2' 2'       Pball circles CW/CCW    20 ea 20 ea   20 ea 20 ea     Pball adductor stretch    5x10\" b/l 5x10\" b/l nv nv 5x10\" b/l 5x10'' b/l     TA ADIM nv 10x5\" 10x5\" 10x5\" +PFMC  10x +PFMC 10x +PFMC 10x +PFMC 10x +PFMC 10x +PFMC 10x +PFMC 10x   PFMC Slow Holds ** nv 10 10   " "       PFMC Quick Flicks ** nv            Seated PFMC   nv 10 10x     5x 5x   Standing PFMC   nv 10 10x     5x 5x   Ther Ex              TA   + PFMC + hip abd iso      GTB 10x5\" GTB 10x5'' GTB 10x5\" GTB 10x5''     TA   + PFMC + hip add iso      10x5\" 10x5'' 10x5\" 10x5''     LTR           10x5\"   TA + bridge        10x 2x10  2x10 3x10                 Ther Activity              Education Done Done   Done Done Done        Constipation management nv             Toileting posture/defecation management nv  Done                         PFMC + bridge              PFMC + squat   nv 15 coordination     nv Sit to stand 10x    PFMC + lifting up **  nv  10# 5x    nv 10# 10x    PFMC + lifting from floor **  nv  10# 5x    nv 10# 5x    PFMC + cough              PFMC + step ups                                                       "

## 2023-07-01 ENCOUNTER — HOSPITAL ENCOUNTER (OUTPATIENT)
Dept: INFUSION CENTER | Facility: CLINIC | Age: 53
Discharge: HOME | End: 2023-07-01
Payer: COMMERCIAL

## 2023-07-01 VITALS
OXYGEN SATURATION: 100 % | DIASTOLIC BLOOD PRESSURE: 73 MMHG | RESPIRATION RATE: 18 BRPM | SYSTOLIC BLOOD PRESSURE: 121 MMHG | HEART RATE: 76 BPM | TEMPERATURE: 97.6 F

## 2023-07-01 DIAGNOSIS — D50.9 IRON DEFICIENCY ANEMIA, UNSPECIFIED IRON DEFICIENCY ANEMIA TYPE: Primary | ICD-10-CM

## 2023-07-01 PROCEDURE — 96365 THER/PROPH/DIAG IV INF INIT: CPT

## 2023-07-01 RX ORDER — SODIUM CHLORIDE 9 MG/ML
20 INJECTION, SOLUTION INTRAVENOUS ONCE
Status: COMPLETED | OUTPATIENT
Start: 2023-07-01 | End: 2023-07-01

## 2023-07-01 RX ORDER — SODIUM CHLORIDE 9 MG/ML
20 INJECTION, SOLUTION INTRAVENOUS ONCE
Status: CANCELLED | OUTPATIENT
Start: 2023-07-08

## 2023-07-01 RX ADMIN — SODIUM CHLORIDE 100 MG: 9 INJECTION, SOLUTION INTRAVENOUS at 13:43

## 2023-07-01 RX ADMIN — SODIUM CHLORIDE 20 ML/HR: 0.9 INJECTION, SOLUTION INTRAVENOUS at 13:43

## 2023-07-01 NOTE — PROGRESS NOTES
Patient here for venofer, offers no complaints. Tolerated infusion without incident.  Patient is aware of next appointment, has AVS.

## 2023-07-07 ENCOUNTER — TELEPHONE (OUTPATIENT)
Dept: INFUSION CENTER | Facility: CLINIC | Age: 53
End: 2023-07-07

## 2023-07-08 ENCOUNTER — HOSPITAL ENCOUNTER (OUTPATIENT)
Dept: INFUSION CENTER | Facility: CLINIC | Age: 53
End: 2023-07-08

## 2023-07-12 ENCOUNTER — OFFICE VISIT (OUTPATIENT)
Dept: PHYSICAL THERAPY | Facility: REHABILITATION | Age: 53
End: 2023-07-12
Payer: COMMERCIAL

## 2023-07-12 DIAGNOSIS — R10.2 PELVIC PAIN: ICD-10-CM

## 2023-07-12 DIAGNOSIS — N81.6 CYSTOCELE WITH RECTOCELE: Primary | ICD-10-CM

## 2023-07-12 DIAGNOSIS — D50.9 IRON DEFICIENCY ANEMIA, UNSPECIFIED IRON DEFICIENCY ANEMIA TYPE: Primary | ICD-10-CM

## 2023-07-12 DIAGNOSIS — N81.10 CYSTOCELE WITH RECTOCELE: Primary | ICD-10-CM

## 2023-07-12 DIAGNOSIS — K59.00 CONSTIPATION, UNSPECIFIED CONSTIPATION TYPE: ICD-10-CM

## 2023-07-12 PROCEDURE — 97112 NEUROMUSCULAR REEDUCATION: CPT

## 2023-07-12 PROCEDURE — 97140 MANUAL THERAPY 1/> REGIONS: CPT

## 2023-07-12 PROCEDURE — 97530 THERAPEUTIC ACTIVITIES: CPT

## 2023-07-12 PROCEDURE — 97110 THERAPEUTIC EXERCISES: CPT

## 2023-07-12 RX ORDER — SODIUM CHLORIDE 9 MG/ML
20 INJECTION, SOLUTION INTRAVENOUS ONCE
Status: CANCELLED | OUTPATIENT
Start: 2023-07-15

## 2023-07-12 NOTE — PROGRESS NOTES
Daily Note     Today's date: 2023  Patient name: Shorty Roach  : 1970  MRN: [id number]  Referring provider: GEE Griffiht  Dx:   Encounter Diagnosis     ICD-10-CM    1. Cystocele with rectocele  N81.10     N81.6       2. Constipation, unspecified constipation type  K59.00       3. Pelvic pain  R10.2           Start Time: 316  Stop Time:   Total time in clinic (min): 55 minutes    Subjective: Patient reports last week she felt "really good". She states she has been walking a lot more outside where she feels increased pressure. Patient reports no pain stating "pain is much better, just some pressure."     Objective: See treatment diary below      Assessment: Tolerated treatment well. Patient demonstrated fatigue post treatment, exhibited good technique with therapeutic exercises and would benefit from continued PT Tolerated all TE performed today well, cues required to take longer rest breaks in between each contraction. Patient reporting most challenge with standing PFMC. Patient educated again on diaphragmatic breathing and given updated HEP this session with patient reporting understanding. Tolerated manuals well, patient reporting some tenderness right side more than left, layer one. Plan: Continue per plan of care. Progress treatment as tolerated. Precautions: hemorrhoids, chronic constipation,  x2  Biofeedback Codes: COVERED  Goals: constipation, straining, pelvic pain/pressure, coordination/pressure management    Manuals 7/12  5/9 5/15 5/24 5/31 6/9 6/15 6/21 6/28   Pelvic floor muscle releases Done    nv?  Done Done  Done Done  Done  Done   ILU massage   Done Done         Iliocecal valve induction   Done                       Neuro Re-Ed             SEMG Biofeedback             Diaphragmatic breathing Done educated            DKTC w breathing             Sole pose w breathing   2'  2' 2' 2'       Pball circles CW/CCW   20 ea 20 ea   20 ea 20 ea     Pball adductor stretch   5x10" b/l 5x10" b/l nv nv 5x10" b/l 5x10'' b/l     TA ADIM +PFMC 10x  10x5" +PFMC  10x +PFMC 10x +PFMC 10x +PFMC 10x +PFMC 10x +PFMC 10x +PFMC 10x   PFMC Slow Holds   10          PFMC Quick Flicks             Seated PFMC 10x  10 10x     5x 5x   Standing PFMC 10x  10 10x     5x 5x   Ther Ex             TA   + PFMC + hip abd iso     GTB 10x5" GTB 10x5'' GTB 10x5" GTB 10x5''     TA   + PFMC + hip add iso     10x5" 10x5'' 10x5" 10x5''     LTR 10x5''         10x5"   TA + bridge 3x10       10x 2x10  2x10 3x10                Ther Activity             Education Done    Done Done Done        Constipation management             Toileting posture/defecation management                          PFMC + bridge             PFMC + squat Sit to stand 10x  15 coordination     nv Sit to stand 10x    PFMC + lifting up    10# 5x    nv 10# 10x    PFMC + lifting from floor    10# 5x    nv 10# 5x    PFMC + cough             PFMC + step ups

## 2023-07-15 ENCOUNTER — HOSPITAL ENCOUNTER (OUTPATIENT)
Dept: INFUSION CENTER | Facility: CLINIC | Age: 53
Discharge: HOME | End: 2023-07-15
Payer: COMMERCIAL

## 2023-07-15 VITALS
DIASTOLIC BLOOD PRESSURE: 69 MMHG | HEART RATE: 61 BPM | TEMPERATURE: 96.5 F | RESPIRATION RATE: 20 BRPM | SYSTOLIC BLOOD PRESSURE: 104 MMHG | OXYGEN SATURATION: 98 %

## 2023-07-15 DIAGNOSIS — D50.9 IRON DEFICIENCY ANEMIA, UNSPECIFIED IRON DEFICIENCY ANEMIA TYPE: Primary | ICD-10-CM

## 2023-07-15 PROCEDURE — 96365 THER/PROPH/DIAG IV INF INIT: CPT

## 2023-07-15 RX ORDER — SODIUM CHLORIDE 9 MG/ML
20 INJECTION, SOLUTION INTRAVENOUS ONCE
Status: CANCELLED | OUTPATIENT
Start: 2023-07-22

## 2023-07-15 RX ORDER — SODIUM CHLORIDE 9 MG/ML
20 INJECTION, SOLUTION INTRAVENOUS ONCE
Status: COMPLETED | OUTPATIENT
Start: 2023-07-15 | End: 2023-07-15

## 2023-07-15 RX ADMIN — SODIUM CHLORIDE 20 ML/HR: 0.9 INJECTION, SOLUTION INTRAVENOUS at 10:05

## 2023-07-15 RX ADMIN — SODIUM CHLORIDE 100 MG: 9 INJECTION, SOLUTION INTRAVENOUS at 10:05

## 2023-07-15 NOTE — PATIENT INSTRUCTIONS
July 2023 Sunday Monday Tuesday Wednesday Thursday Friday Saturday                                 1    INF THERAPY PLAN   1:30 PM   (120 min.)   AN INF CHAIR [facility]         Cycle 1, Treatment 4   2     3     4     5     6     7     8                9     10     11     12    PT-[facility] TREATMENT   8:45 AM   (45 min.)   Larwence Spruce, PTA   Physical Therapy at [facility]. [facility]  13     14     15    INF THERAPY PLAN  10:00 AM   (120 min.)   AN INF CHAIR 2   [facility]         Cycle 1, Treatment 5   16     17     18     19     20     21     22    INF THERAPY PLAN  12:00 PM   (120 min.)   AN INF CHAIR [facility]         Cycle 1, Treatment 6   23     24     25     26    PT-[facility] TREATMENT   9:30 AM   (45 min.)   Larwence Spruce, PTA   Physical Therapy at [facility]. [facility]  27     28     29    INF THERAPY PLAN   8:00 AM   (120 min.)   AN INF CHAIR 2   [facility]         Cycle 1, Treatment 7   30     31                                                Treatment Details         7/1/2023 - Cycle 1, Treatment 4      Supportive Care: APPT 17, ONCBCN NURSE [id number], sodium chloride, iron sucrose (VENOFER), MONITOR JESSIKA, ONCBCN NURSE COMMUNICATION7    7/15/2023 - Cycle 1, Treatment 5      Supportive Care: APPT 17, ONCBCN NURSE [id number], sodium chloride, iron sucrose (VENOFER), MONITOR JESSIKA, ONCBCN NURSE COMMUNICATION7    7/22/2023 - Cycle 1, Treatment 6      Supportive Care: APPT 17    7/29/2023 - Cycle 1, Treatment 7      Supportive Care: APPT 17 August 2023 Sunday Monday Tuesday Wednesday Thursday Friday Saturday             1     2     3     4     5             Cycle 1, Treatment 8   6     7     8     9     10     11     12             Cycle 1, Treatment 9   13     14     15     16     17     18     19             Cycle 1, Treatment 10 20     21     22     23     24     25     26                27     28     29     30     31                                 Treatment Details         8/5/2023 - Cycle 1, Treatment 8      Supportive Care: APPT 17    8/12/2023 - Cycle 1, Treatment 9      Supportive Care: APPT 17    8/19/2023 - Cycle 1, Treatment 10      Supportive Care: APPT 17

## 2023-07-15 NOTE — PROGRESS NOTES
Patient here for venofer dosing and is doing well, no c/o or changes to report. venofer infusing as ordered.

## 2023-07-15 NOTE — PROGRESS NOTES
Patient tolerated treatment without incident and was discharged post, no c/o offered. Patient will RTO 7/22/23 for same.

## 2023-07-22 ENCOUNTER — HOSPITAL ENCOUNTER (OUTPATIENT)
Dept: INFUSION CENTER | Facility: CLINIC | Age: 53
Discharge: HOME | End: 2023-07-22
Payer: COMMERCIAL

## 2023-07-22 VITALS
OXYGEN SATURATION: 97 % | SYSTOLIC BLOOD PRESSURE: 109 MMHG | DIASTOLIC BLOOD PRESSURE: 68 MMHG | HEART RATE: 77 BPM | TEMPERATURE: 98.3 F | RESPIRATION RATE: 18 BRPM

## 2023-07-22 DIAGNOSIS — D50.9 IRON DEFICIENCY ANEMIA, UNSPECIFIED IRON DEFICIENCY ANEMIA TYPE: Primary | ICD-10-CM

## 2023-07-22 PROCEDURE — 96365 THER/PROPH/DIAG IV INF INIT: CPT

## 2023-07-22 RX ORDER — SODIUM CHLORIDE 9 MG/ML
20 INJECTION, SOLUTION INTRAVENOUS ONCE
Status: CANCELLED | OUTPATIENT
Start: 2023-07-29

## 2023-07-22 RX ORDER — SODIUM CHLORIDE 9 MG/ML
20 INJECTION, SOLUTION INTRAVENOUS ONCE
Status: COMPLETED | OUTPATIENT
Start: 2023-07-22 | End: 2023-07-22

## 2023-07-22 RX ADMIN — SODIUM CHLORIDE 20 ML/HR: 0.9 INJECTION, SOLUTION INTRAVENOUS at 12:00

## 2023-07-22 RX ADMIN — SODIUM CHLORIDE 100 MG: 9 INJECTION, SOLUTION INTRAVENOUS at 12:00

## 2023-07-22 NOTE — PROGRESS NOTES
Pt to clinic for venofer infusion, pt tolerated without complications, reviewed next appointment, declined avs

## 2023-07-26 ENCOUNTER — OFFICE VISIT (OUTPATIENT)
Dept: PHYSICAL THERAPY | Facility: REHABILITATION | Age: 53
End: 2023-07-26
Payer: COMMERCIAL

## 2023-07-26 DIAGNOSIS — N81.10 CYSTOCELE WITH RECTOCELE: Primary | ICD-10-CM

## 2023-07-26 DIAGNOSIS — K59.00 CONSTIPATION, UNSPECIFIED CONSTIPATION TYPE: ICD-10-CM

## 2023-07-26 DIAGNOSIS — N81.6 CYSTOCELE WITH RECTOCELE: Primary | ICD-10-CM

## 2023-07-26 DIAGNOSIS — R10.2 PELVIC PAIN: ICD-10-CM

## 2023-07-26 PROCEDURE — 97140 MANUAL THERAPY 1/> REGIONS: CPT

## 2023-07-26 PROCEDURE — 97110 THERAPEUTIC EXERCISES: CPT

## 2023-07-26 PROCEDURE — 97112 NEUROMUSCULAR REEDUCATION: CPT

## 2023-07-26 NOTE — PROGRESS NOTES
Daily Note     Today's date: 2023  Patient name: Chantell Arceo  : 1970  MRN: [id number]  Referring provider: GEE Puga  Dx:   Encounter Diagnosis     ICD-10-CM    1. Cystocele with rectocele  N81.10     N81.6       2. Constipation, unspecified constipation type  K59.00       3. Pelvic pain  R10.2           Start Time: 930  Stop Time:   Total time in clinic (min): 45 minutes    Subjective: Patient reports "feeling better overall" at start of session, reporting overall pelvic floor discomfort has subsided, reporting some low back soreness at start of session secondary to cleaning her house. She reports no complaints after previous session. Objective: See treatment diary below      Assessment: Tolerated treatment well. Patient demonstrated fatigue post treatment and exhibited good technique with therapeutic exercises      Plan: Patient given updated HEP this session with patient reporting understanding. Patient to transition to HEP and will continue to follow up with patient as appropriate     Precautions: hemorrhoids, chronic constipation,  x2  Biofeedback Codes: COVERED  Goals: constipation, straining, pelvic pain/pressure, coordination/pressure management    Manuals 7/12 7/26 5/9 5/15 5/24 5/31 6/9 6/15 6/21 6/28   Pelvic floor muscle releases Done  Done   nv?  Done Done  Done Done  Done  Done   ILU massage   Done Done         Iliocecal valve induction   Done                       Neuro Re-Ed             SEMG Biofeedback             Diaphragmatic breathing Done educated            Eliazar marcelino breathing             Sole pose w breathing   2'  2' 2' 2'       Pball circles CW/CCW   20 ea 20 ea   20 ea 20 ea     Pball adductor stretch   5x10" b/l 5x10" b/l nv nv 5x10" b/l 5x10'' b/l     TA ADIM +PFMC 10x +PFMC 10x 10x5" +PFMC  10x +PFMC 10x +PFMC 10x +PFMC 10x +PFMC 10x +PFMC 10x +PFMC 10x   PFMC Slow Holds   10          PFMC Quick Flicks             Seated PFMC 10x 10x 10 10x     5x 5x Standing PFMC 10x 10x 10 10x     5x 5x   Ther Ex             TA   + PFMC + hip abd iso  HEP   GTB 10x5" GTB 10x5'' GTB 10x5" GTB 10x5''     TA   + PFMC + hip add iso  HEP   10x5" 10x5'' 10x5" 10x5''     LTR 10x5'' 10x5''        10x5"   TA + bridge 3x10  3x10     10x 2x10  2x10 3x10                Ther Activity             Education Done  Done   Done Done Done        Constipation management             Toileting posture/defecation management                          PFMC + bridge             PFMC + squat Sit to stand 10x Sit to stand 10x 15 coordination     nv Sit to stand 10x    PFMC + lifting up    10# 5x    nv 10# 10x    PFMC + lifting from floor    10# 5x    nv 10# 5x    PFMC + cough             PFMC + step ups

## 2023-07-29 ENCOUNTER — HOSPITAL ENCOUNTER (OUTPATIENT)
Dept: INFUSION CENTER | Facility: CLINIC | Age: 53
End: 2023-07-29

## 2023-07-31 DIAGNOSIS — D50.9 IRON DEFICIENCY ANEMIA, UNSPECIFIED IRON DEFICIENCY ANEMIA TYPE: Primary | ICD-10-CM

## 2023-07-31 RX ORDER — SODIUM CHLORIDE 9 MG/ML
20 INJECTION, SOLUTION INTRAVENOUS ONCE
Status: CANCELLED | OUTPATIENT
Start: 2023-08-01

## 2023-08-01 ENCOUNTER — HOSPITAL ENCOUNTER (OUTPATIENT)
Dept: INFUSION CENTER | Facility: CLINIC | Age: 53
Discharge: HOME | End: 2023-08-01
Payer: COMMERCIAL

## 2023-08-01 VITALS
RESPIRATION RATE: 18 BRPM | OXYGEN SATURATION: 100 % | SYSTOLIC BLOOD PRESSURE: 105 MMHG | HEART RATE: 66 BPM | TEMPERATURE: 97 F | DIASTOLIC BLOOD PRESSURE: 66 MMHG

## 2023-08-01 DIAGNOSIS — D50.9 IRON DEFICIENCY ANEMIA, UNSPECIFIED IRON DEFICIENCY ANEMIA TYPE: Primary | ICD-10-CM

## 2023-08-01 PROCEDURE — 96365 THER/PROPH/DIAG IV INF INIT: CPT

## 2023-08-01 RX ORDER — SODIUM CHLORIDE 9 MG/ML
20 INJECTION, SOLUTION INTRAVENOUS ONCE
Status: COMPLETED | OUTPATIENT
Start: 2023-08-01 | End: 2023-08-01

## 2023-08-01 RX ORDER — SODIUM CHLORIDE 9 MG/ML
20 INJECTION, SOLUTION INTRAVENOUS ONCE
Status: CANCELLED | OUTPATIENT
Start: 2023-08-09

## 2023-08-01 RX ADMIN — SODIUM CHLORIDE 100 MG: 9 INJECTION, SOLUTION INTRAVENOUS at 12:38

## 2023-08-01 RX ADMIN — SODIUM CHLORIDE 20 ML/HR: 0.9 INJECTION, SOLUTION INTRAVENOUS at 12:40

## 2023-08-01 NOTE — PROGRESS NOTES
Patient tolerated treatment without incident. Peripheral IV removed. Patient to make additional appointments upon exit. AVS declined.

## 2023-08-04 DIAGNOSIS — E03.9 HYPOTHYROIDISM, UNSPECIFIED TYPE: ICD-10-CM

## 2023-08-06 RX ORDER — LEVOTHYROXINE SODIUM 0.15 MG/1
137 TABLET ORAL DAILY
Qty: 60 TABLET | Refills: 0 | Status: SHIPPED | OUTPATIENT
Start: 2023-08-06

## 2023-08-07 ENCOUNTER — APPOINTMENT (OUTPATIENT)
Dept: LAB | Facility: CLINIC | Age: 53
End: 2023-08-07
Payer: COMMERCIAL

## 2023-08-07 DIAGNOSIS — E03.9 HYPOTHYROIDISM, UNSPECIFIED TYPE: ICD-10-CM

## 2023-08-07 DIAGNOSIS — Z13.6 SCREENING FOR CARDIOVASCULAR CONDITION: ICD-10-CM

## 2023-08-07 DIAGNOSIS — E55.9 VITAMIN D DEFICIENCY: ICD-10-CM

## 2023-08-07 LAB
25(OH)D3 SERPL-MCNC: 22.7 NG/ML (ref 30–100)
ALBUMIN SERPL BCP-MCNC: 4 G/DL (ref 3.5–5)
ALP SERPL-CCNC: 69 U/L (ref 34–104)
ALT SERPL W P-5'-P-CCNC: 10 U/L (ref 7–52)
ANION GAP SERPL CALCULATED.3IONS-SCNC: 5 MMOL/L
AST SERPL W P-5'-P-CCNC: 14 U/L (ref 13–39)
BILIRUB SERPL-MCNC: 0.32 MG/DL (ref 0.2–1)
BUN SERPL-MCNC: 11 MG/DL (ref 5–25)
CALCIUM SERPL-MCNC: 9.2 MG/DL (ref 8.4–10.2)
CHLORIDE SERPL-SCNC: 105 MMOL/L (ref 96–108)
CHOLEST SERPL-MCNC: 188 MG/DL
CO2 SERPL-SCNC: 29 MMOL/L (ref 21–32)
CREAT SERPL-MCNC: 0.68 MG/DL (ref 0.6–1.3)
GFR SERPL CREATININE-BSD FRML MDRD: 101 ML/MIN/1.73SQ M
GLUCOSE P FAST SERPL-MCNC: 89 MG/DL (ref 65–99)
HDLC SERPL-MCNC: 48 MG/DL
LDLC SERPL CALC-MCNC: 114 MG/DL (ref 0–100)
NONHDLC SERPL-MCNC: 140 MG/DL
POTASSIUM SERPL-SCNC: 3.8 MMOL/L (ref 3.5–5.3)
PROT SERPL-MCNC: 7.9 G/DL (ref 6.4–8.4)
SODIUM SERPL-SCNC: 139 MMOL/L (ref 135–147)
TRIGL SERPL-MCNC: 132 MG/DL
TSH SERPL DL<=0.05 MIU/L-ACNC: 4.02 UIU/ML (ref 0.45–4.5)

## 2023-08-07 PROCEDURE — 80061 LIPID PANEL: CPT

## 2023-08-07 PROCEDURE — 82306 VITAMIN D 25 HYDROXY: CPT

## 2023-08-07 PROCEDURE — 36415 COLL VENOUS BLD VENIPUNCTURE: CPT

## 2023-08-07 PROCEDURE — 84443 ASSAY THYROID STIM HORMONE: CPT

## 2023-08-07 PROCEDURE — 80053 COMPREHEN METABOLIC PANEL: CPT

## 2023-08-09 ENCOUNTER — HOSPITAL ENCOUNTER (OUTPATIENT)
Dept: INFUSION CENTER | Facility: CLINIC | Age: 53
Discharge: HOME | End: 2023-08-09
Payer: COMMERCIAL

## 2023-08-09 VITALS
HEART RATE: 76 BPM | RESPIRATION RATE: 18 BRPM | DIASTOLIC BLOOD PRESSURE: 65 MMHG | TEMPERATURE: 97.9 F | SYSTOLIC BLOOD PRESSURE: 99 MMHG | OXYGEN SATURATION: 97 %

## 2023-08-09 DIAGNOSIS — D50.9 IRON DEFICIENCY ANEMIA, UNSPECIFIED IRON DEFICIENCY ANEMIA TYPE: Primary | ICD-10-CM

## 2023-08-09 RX ORDER — SODIUM CHLORIDE 9 MG/ML
20 INJECTION, SOLUTION INTRAVENOUS ONCE
Status: CANCELLED | OUTPATIENT
Start: 2023-08-16

## 2023-08-09 RX ORDER — SODIUM CHLORIDE 9 MG/ML
20 INJECTION, SOLUTION INTRAVENOUS ONCE
Status: COMPLETED | OUTPATIENT
Start: 2023-08-09 | End: 2023-08-09

## 2023-08-09 RX ADMIN — SODIUM CHLORIDE 20 ML/HR: 0.9 INJECTION, SOLUTION INTRAVENOUS at 10:45

## 2023-08-09 RX ADMIN — SODIUM CHLORIDE 100 MG: 9 INJECTION, SOLUTION INTRAVENOUS at 10:51

## 2023-08-10 ENCOUNTER — OFFICE VISIT (OUTPATIENT)
Dept: FAMILY MEDICINE CLINIC | Facility: CLINIC | Age: 53
End: 2023-08-10

## 2023-08-10 VITALS
TEMPERATURE: 98.3 F | BODY MASS INDEX: 25.92 KG/M2 | OXYGEN SATURATION: 98 % | WEIGHT: 151.8 LBS | HEIGHT: 64 IN | SYSTOLIC BLOOD PRESSURE: 115 MMHG | DIASTOLIC BLOOD PRESSURE: 73 MMHG | HEART RATE: 73 BPM

## 2023-08-10 DIAGNOSIS — Z00.00 ANNUAL PHYSICAL EXAM: Primary | ICD-10-CM

## 2023-08-10 DIAGNOSIS — D50.9 IRON DEFICIENCY ANEMIA, UNSPECIFIED IRON DEFICIENCY ANEMIA TYPE: ICD-10-CM

## 2023-08-10 DIAGNOSIS — E55.9 VITAMIN D DEFICIENCY: ICD-10-CM

## 2023-08-10 DIAGNOSIS — E03.9 HYPOTHYROIDISM, UNSPECIFIED TYPE: ICD-10-CM

## 2023-08-10 PROCEDURE — 99396 PREV VISIT EST AGE 40-64: CPT | Performed by: FAMILY MEDICINE

## 2023-08-10 NOTE — ASSESSMENT & PLAN NOTE
Receiving iron infusions, reports she is doing very well.   She has 2 infusions left, will repeat CBC and iron panel in 3 months

## 2023-08-10 NOTE — PROGRESS NOTES
[address] [facility] [place]    NAME: Shorty Roach  AGE: 48 y.o. SEX: female  : 1970     DATE: 8/10/2023     Assessment and Plan:     Problem List Items Addressed This Visit        Endocrine    Hypothyroidism     Continue levothyroxine 150 mcg, TSH in August was 4.03, will repeat TSH in 3 months. Relevant Orders    TSH, 3rd generation       Other    Iron deficiency anemia     Receiving iron infusions, reports she is doing very well. She has 2 infusions left, will repeat CBC and iron panel in 3 months         Relevant Orders    CBC and differential    Iron Panel (Includes Ferritin, Iron Sat%, Iron, and TIBC)    Vitamin D deficiency     Vitamin D level of 22.7 on recent labs, continue over-the-counter vitamin D supplementation        Other Visit Diagnoses     Annual physical exam    -  Primary    BMI 26.0-26.9,adult              Immunizations and preventive care screenings were discussed with patient today. Appropriate education was printed on patient's after visit summary. Counseling:  · Exercise: the importance of regular exercise/physical activity was discussed. Recommend exercise 3-5 times per week for at least 30 minutes. BMI Counseling: Body mass index is 26.06 kg/m². The BMI is above normal. Nutrition recommendations include decreasing portion sizes and encouraging healthy choices of fruits and vegetables. Exercise recommendations include moderate physical activity 150 minutes/week. Rationale for BMI follow-up plan is due to patient being overweight or obese. Return in 3 months (on 11/10/2023). Chief Complaint:     Chief Complaint   Patient presents with   • Annual Exam     No complaints      History of Present Illness:     Adult Annual Physical   Patient here for a comprehensive physical exam. The patient reports no problems.   Patient reports she is doing very well after her iron infusions and physical therapy. Diet and Physical Activity  · Diet/Nutrition: well balanced diet. · Exercise: walking. Depression Screening  PHQ-2/9 Depression Screening    Little interest or pleasure in doing things: 0 - not at all  Feeling down, depressed, or hopeless: 0 - not at all  Trouble falling or staying asleep, or sleeping too much: 0 - not at all  Feeling tired or having little energy: 2 - more than half the days  Poor appetite or overeatin - not at all  Feeling bad about yourself - or that you are a failure or have let yourself or your family down: 0 - not at all  Trouble concentrating on things, such as reading the newspaper or watching television: 0 - not at all  Moving or speaking so slowly that other people could have noticed. Or the opposite - being so fidgety or restless that you have been moving around a lot more than usual: 1 - several days  Thoughts that you would be better off dead, or of hurting yourself in some way: 0 - not at all  PHQ-9 Score: 3   PHQ-9 Interpretation: No or Minimal depression        General Health  · Sleep: sleeps well. · Hearing: normal - bilateral.  · Vision: no vision problems. · Dental: regular dental visits. /GYN Health  · Patient is: postmenopausal 2 years   · Last menstrual period: 2 years ago   · Contraceptive method: None . Review of Systems:     Review of Systems   Constitutional: Negative for chills and fever. HENT: Negative for ear pain and sore throat. Eyes: Negative for pain and visual disturbance. Respiratory: Negative for cough and shortness of breath. Cardiovascular: Negative for chest pain and palpitations. Gastrointestinal: Negative for abdominal pain and vomiting. Genitourinary: Negative for dysuria and hematuria. Musculoskeletal: Negative for arthralgias and back pain. Skin: Negative for color change and rash. Neurological: Negative for seizures and syncope. All other systems reviewed and are negative.      Past Medical History:     Past Medical History:   Diagnosis Date   • Allergic    • Anemia     iron def   • Disease of thyroid gland    • Dyspareunia in female    • Fatigue    • Hemorrhoids    • Hypothyroidism    • Rectal bleeding    • Varicose veins of both legs with edema       Past Surgical History:     Past Surgical History:   Procedure Laterality Date   • COLONOSCOPY  2008   • COLONOSCOPY  11/15/2019    10 YEAR RECOMMENDED      Social History:     Social History     Socioeconomic History   • Marital status: /Civil Union     Spouse name: None   • Number of children: None   • Years of education: None   • Highest education level: None   Occupational History   • None   Tobacco Use   • Smoking status: Never   • Smokeless tobacco: Never   Vaping Use   • Vaping Use: Never used   Substance and Sexual Activity   • Alcohol use: No   • Drug use: No   • Sexual activity: Yes     Partners: Male     Birth control/protection: Condom Male   Other Topics Concern   • None   Social History Narrative   • None     Social Determinants of Health     Financial Resource Strain: Low Risk  (1/30/2023)    Overall Financial Resource Strain (CARDIA)    • Difficulty of Paying Living Expenses: Not hard at all   Food Insecurity: No Food Insecurity (1/30/2023)    Hunger Vital Sign    • Worried About Running Out of Food in the Last Year: Never true    • Ran Out of Food in the Last Year: Never true   Transportation Needs: No Transportation Needs (1/30/2023)    PRAPARE - Transportation    • Lack of Transportation (Medical): No    • Lack of Transportation (Non-Medical): No   Physical Activity: Inactive (11/17/2022)    Exercise Vital Sign    • Days of Exercise per Week: 0 days    • Minutes of Exercise per Session: 0 min   Stress: No Stress Concern Present (8/4/2022)    [address] [place] [address]    • Feeling of Stress :  Only a little   Social Connections: Not on file   Intimate Partner Violence: Not At Risk (8/4/2022)    Humiliation, Afraid, Rape, and Kick questionnaire    • Fear of Current or Ex-Partner: No    • Emotionally Abused: No    • Physically Abused: No    • Sexually Abused: No   Housing Stability: Low Risk  (8/4/2022)    Housing Stability Vital Sign    • Unable to Pay for Housing in the Last Year: No    • Number of Places Lived in the Last Year: 1    • Unstable Housing in the Last Year: No      Family History:     Family History   Problem Relation Age of Onset   • Hypertension Mother    • Asthma Father    • No Known Problems Sister    • No Known Problems Daughter    • No Known Problems Maternal Grandmother    • No Known Problems Maternal Grandfather    • No Known Problems Paternal Grandmother    • No Known Problems Paternal Grandfather    • No Known Problems Brother    • No Known Problems Son    • No Known Problems Maternal Aunt    • No Known Problems Maternal Aunt    • No Known Problems Paternal Aunt    • No Known Problems Paternal Aunt    • No Known Problems Paternal Aunt       Current Medications:     Current Outpatient Medications   Medication Sig Dispense Refill   • cholecalciferol (VITAMIN D3) 1,000 units tablet Take 1 tablet (1,000 Units total) by mouth daily 90 tablet 1   • clobetasol (TEMOVATE) 0.05 % cream Apply topically 2 (two) times a day 30 g 0   • docusate sodium (COLACE) 100 mg capsule Take 1 capsule by mouth 2 (two) times a day as needed     • escitalopram (LEXAPRO) 5 mg tablet Take 1 tablet (5 mg total) by mouth daily 30 tablet 1   • fluticasone (FLONASE) 50 mcg/act nasal spray 1 spray into each nostril daily 9.9 mL 0   • levothyroxine (Euthyrox) 150 mcg tablet Take 1 tablet (150 mcg total) by mouth daily 60 tablet 0   • loratadine (CLARITIN) 10 mg tablet Take 1 tablet (10 mg total) by mouth daily (Patient taking differently: Take 10 mg by mouth daily PRN) 90 tablet 1   • Multiple Vitamin (MULTIVITAMIN) capsule Take 1 capsule by mouth daily 90 capsule 3   • nystatin (MYCOSTATIN) cream Apply topically 2 (two) times a day 30 g 0   • polyethylene glycol (GLYCOLAX) 17 GM/SCOOP powder Take 17 g by mouth daily PRN     • psyllium (METAMUCIL SMOOTH TEXTURE) 28 % packet Take 1 packet by mouth 2 (two) times a day 30 tablet 2   • sodium chloride (OCEAN) 0.65 % nasal spray 1 spray into each nostril as needed for congestion or rhinitis (Patient taking differently: 1 spray into each nostril as needed for congestion or rhinitis PRN) 30 mL 1   • triamcinolone (KENALOG) 0.1 % ointment Apply topically 2 (two) times a day 80 g 3     No current facility-administered medications for this visit. Allergies: Allergies   Allergen Reactions   • Amoxicillin    • Kiwi Extract - Food Allergy    • Sulfa Antibiotics       Physical Exam:     /73 (BP Location: Left arm, Patient Position: Sitting, Cuff Size: Standard)   Pulse 73   Temp 98.3 °F (36.8 °C) (Temporal)   Ht 5' 4" (1.626 m)   Wt 68.9 kg (151 lb 12.8 oz)   LMP 08/15/2020 (Approximate)   SpO2 98%   BMI 26.06 kg/m²     Physical Exam  Vitals and nursing note reviewed. Constitutional:       General: She is not in acute distress. Appearance: She is well-developed. HENT:      Head: Normocephalic and atraumatic. Eyes:      Conjunctiva/sclera: Conjunctivae normal.   Cardiovascular:      Rate and Rhythm: Normal rate and regular rhythm. Heart sounds: No murmur heard. Pulmonary:      Effort: Pulmonary effort is normal. No respiratory distress. Breath sounds: Normal breath sounds. Abdominal:      Palpations: Abdomen is soft. Tenderness: There is no abdominal tenderness. Musculoskeletal:         General: No swelling. Cervical back: Neck supple. Skin:     General: Skin is warm and dry. Capillary Refill: Capillary refill takes less than 2 seconds. Neurological:      Mental Status: She is alert.    Psychiatric:         Mood and Affect: Mood normal.          Oleg Fulton MD  [facility]

## 2023-08-16 ENCOUNTER — HOSPITAL ENCOUNTER (OUTPATIENT)
Dept: INFUSION CENTER | Facility: CLINIC | Age: 53
Discharge: HOME | End: 2023-08-16
Payer: COMMERCIAL

## 2023-08-16 VITALS
DIASTOLIC BLOOD PRESSURE: 63 MMHG | RESPIRATION RATE: 18 BRPM | SYSTOLIC BLOOD PRESSURE: 105 MMHG | HEART RATE: 71 BPM | TEMPERATURE: 96.7 F | OXYGEN SATURATION: 99 %

## 2023-08-16 DIAGNOSIS — D50.9 IRON DEFICIENCY ANEMIA, UNSPECIFIED IRON DEFICIENCY ANEMIA TYPE: Primary | ICD-10-CM

## 2023-08-16 PROCEDURE — 96365 THER/PROPH/DIAG IV INF INIT: CPT

## 2023-08-16 RX ORDER — SODIUM CHLORIDE 9 MG/ML
20 INJECTION, SOLUTION INTRAVENOUS ONCE
Status: CANCELLED | OUTPATIENT
Start: 2023-08-23

## 2023-08-16 RX ORDER — SODIUM CHLORIDE 9 MG/ML
20 INJECTION, SOLUTION INTRAVENOUS ONCE
Status: COMPLETED | OUTPATIENT
Start: 2023-08-16 | End: 2023-08-16

## 2023-08-16 RX ADMIN — SODIUM CHLORIDE 20 ML/HR: 0.9 INJECTION, SOLUTION INTRAVENOUS at 12:55

## 2023-08-16 RX ADMIN — IRON SUCROSE 100 MG: 20 INJECTION, SOLUTION INTRAVENOUS at 13:05

## 2023-08-16 NOTE — PROGRESS NOTES
Patient to [address] [place] for Venofer: Offers no complaints at present time: Lab work ( 08/07/23 ) reviewed:  Within parameters to treat: Left FA PIV initiated without incident

## 2023-08-22 DIAGNOSIS — D50.9 IRON DEFICIENCY ANEMIA, UNSPECIFIED IRON DEFICIENCY ANEMIA TYPE: Primary | ICD-10-CM

## 2023-08-22 RX ORDER — SODIUM CHLORIDE 9 MG/ML
20 INJECTION, SOLUTION INTRAVENOUS ONCE
OUTPATIENT
Start: 2023-09-02

## 2023-08-23 ENCOUNTER — HOSPITAL ENCOUNTER (OUTPATIENT)
Dept: INFUSION CENTER | Facility: CLINIC | Age: 53
Discharge: HOME | End: 2023-08-23

## 2023-09-02 ENCOUNTER — HOSPITAL ENCOUNTER (OUTPATIENT)
Dept: INFUSION CENTER | Facility: CLINIC | Age: 53
Discharge: HOME | End: 2023-09-02

## 2023-09-27 DIAGNOSIS — E03.9 HYPOTHYROIDISM, UNSPECIFIED TYPE: ICD-10-CM

## 2023-09-27 DIAGNOSIS — J30.89 ALLERGIC RHINITIS DUE TO OTHER ALLERGIC TRIGGER, UNSPECIFIED SEASONALITY: ICD-10-CM

## 2023-09-27 DIAGNOSIS — L20.9 ATOPIC DERMATITIS, UNSPECIFIED TYPE: ICD-10-CM

## 2023-09-27 RX ORDER — TRIAMCINOLONE ACETONIDE 1 MG/G
OINTMENT TOPICAL 2 TIMES DAILY
Qty: 80 G | Refills: 0 | Status: SHIPPED | OUTPATIENT
Start: 2023-09-27

## 2023-09-27 RX ORDER — LEVOTHYROXINE SODIUM 0.15 MG/1
137 TABLET ORAL DAILY
Qty: 60 TABLET | Refills: 0 | Status: SHIPPED | OUTPATIENT
Start: 2023-09-27

## 2023-09-27 RX ORDER — FLUTICASONE PROPIONATE 50 MCG
1 SPRAY, SUSPENSION (ML) NASAL DAILY
Qty: 9.9 ML | Refills: 0 | Status: SHIPPED | OUTPATIENT
Start: 2023-09-27

## 2023-11-22 DIAGNOSIS — E03.9 HYPOTHYROIDISM, UNSPECIFIED TYPE: ICD-10-CM

## 2023-11-22 RX ORDER — LEVOTHYROXINE SODIUM 0.15 MG/1
137 TABLET ORAL DAILY
Qty: 60 TABLET | Refills: 0 | Status: SHIPPED | OUTPATIENT
Start: 2023-11-22

## 2024-01-05 ENCOUNTER — APPOINTMENT (OUTPATIENT)
Dept: LAB | Facility: CLINIC | Age: 54
End: 2024-01-05
Payer: COMMERCIAL

## 2024-01-05 DIAGNOSIS — D50.9 IRON DEFICIENCY ANEMIA, UNSPECIFIED IRON DEFICIENCY ANEMIA TYPE: ICD-10-CM

## 2024-01-05 DIAGNOSIS — E03.9 HYPOTHYROIDISM, UNSPECIFIED TYPE: ICD-10-CM

## 2024-01-05 LAB
BASOPHILS # BLD AUTO: 0.04 THOUSANDS/ÂΜL (ref 0–0.1)
BASOPHILS NFR BLD AUTO: 1 % (ref 0–1)
EOSINOPHIL # BLD AUTO: 0.32 THOUSAND/ÂΜL (ref 0–0.61)
EOSINOPHIL NFR BLD AUTO: 6 % (ref 0–6)
ERYTHROCYTE [DISTWIDTH] IN BLOOD BY AUTOMATED COUNT: 13.6 % (ref 11.6–15.1)
HCT VFR BLD AUTO: 39.1 % (ref 34.8–46.1)
HGB BLD-MCNC: 12.4 G/DL (ref 11.5–15.4)
IMM GRANULOCYTES # BLD AUTO: 0.01 THOUSAND/UL (ref 0–0.2)
IMM GRANULOCYTES NFR BLD AUTO: 0 % (ref 0–2)
LYMPHOCYTES # BLD AUTO: 1.37 THOUSANDS/ÂΜL (ref 0.6–4.47)
LYMPHOCYTES NFR BLD AUTO: 28 % (ref 14–44)
MCH RBC QN AUTO: 28.6 PG (ref 26.8–34.3)
MCHC RBC AUTO-ENTMCNC: 31.7 G/DL (ref 31.4–37.4)
MCV RBC AUTO: 90 FL (ref 82–98)
MONOCYTES # BLD AUTO: 0.42 THOUSAND/ÂΜL (ref 0.17–1.22)
MONOCYTES NFR BLD AUTO: 8 % (ref 4–12)
NEUTROPHILS # BLD AUTO: 2.83 THOUSANDS/ÂΜL (ref 1.85–7.62)
NEUTS SEG NFR BLD AUTO: 57 % (ref 43–75)
NRBC BLD AUTO-RTO: 0 /100 WBCS
PLATELET # BLD AUTO: 198 THOUSANDS/UL (ref 149–390)
PMV BLD AUTO: 11.7 FL (ref 8.9–12.7)
RBC # BLD AUTO: 4.34 MILLION/UL (ref 3.81–5.12)
TSH SERPL DL<=0.05 MIU/L-ACNC: 5.24 UIU/ML (ref 0.45–4.5)
WBC # BLD AUTO: 4.99 THOUSAND/UL (ref 4.31–10.16)

## 2024-01-05 PROCEDURE — 36415 COLL VENOUS BLD VENIPUNCTURE: CPT

## 2024-01-05 PROCEDURE — 85025 COMPLETE CBC W/AUTO DIFF WBC: CPT

## 2024-01-05 PROCEDURE — 84443 ASSAY THYROID STIM HORMONE: CPT

## 2024-01-08 ENCOUNTER — TELEPHONE (OUTPATIENT)
Dept: FAMILY MEDICINE CLINIC | Facility: CLINIC | Age: 54
End: 2024-01-08

## 2024-01-08 NOTE — TELEPHONE ENCOUNTER
LVM for patient to schedule an appointment to discuss lab results per PCP    Patient can be reached at [phone]

## 2024-01-26 DIAGNOSIS — E03.9 HYPOTHYROIDISM, UNSPECIFIED TYPE: ICD-10-CM

## 2024-01-26 RX ORDER — LEVOTHYROXINE SODIUM 0.15 MG/1
150 TABLET ORAL DAILY
Qty: 60 TABLET | Refills: 0 | Status: SHIPPED | OUTPATIENT
Start: 2024-01-26

## 2024-02-08 ENCOUNTER — OFFICE VISIT (OUTPATIENT)
Dept: FAMILY MEDICINE CLINIC | Facility: CLINIC | Age: 54
End: 2024-02-08

## 2024-02-08 VITALS
TEMPERATURE: 98.3 F | BODY MASS INDEX: 25.64 KG/M2 | RESPIRATION RATE: 18 BRPM | OXYGEN SATURATION: 98 % | HEIGHT: 64 IN | HEART RATE: 69 BPM | DIASTOLIC BLOOD PRESSURE: 71 MMHG | WEIGHT: 150.2 LBS | SYSTOLIC BLOOD PRESSURE: 113 MMHG

## 2024-02-08 DIAGNOSIS — D50.9 IRON DEFICIENCY ANEMIA, UNSPECIFIED IRON DEFICIENCY ANEMIA TYPE: ICD-10-CM

## 2024-02-08 DIAGNOSIS — L20.9 ATOPIC DERMATITIS, UNSPECIFIED TYPE: ICD-10-CM

## 2024-02-08 DIAGNOSIS — F32.1 CURRENT MODERATE EPISODE OF MAJOR DEPRESSIVE DISORDER WITHOUT PRIOR EPISODE (HCC): ICD-10-CM

## 2024-02-08 DIAGNOSIS — M54.50 CHRONIC MIDLINE LOW BACK PAIN WITHOUT SCIATICA: Primary | ICD-10-CM

## 2024-02-08 DIAGNOSIS — E03.9 HYPOTHYROIDISM, UNSPECIFIED TYPE: ICD-10-CM

## 2024-02-08 DIAGNOSIS — G89.29 CHRONIC MIDLINE LOW BACK PAIN WITHOUT SCIATICA: Primary | ICD-10-CM

## 2024-02-08 DIAGNOSIS — E55.9 VITAMIN D DEFICIENCY: ICD-10-CM

## 2024-02-08 PROCEDURE — 99214 OFFICE O/P EST MOD 30 MIN: CPT | Performed by: FAMILY MEDICINE

## 2024-02-08 NOTE — ASSESSMENT & PLAN NOTE
Had hemoglobin of 12 and recent blood work.  Now patient complains of fatigue again, will repeat CBC

## 2024-02-08 NOTE — PROGRESS NOTES
Name: Shekhar Nance      : 1970      MRN: [id number]  Encounter Provider: Makayla Kramer MD  Encounter Date: 2024   Encounter department: [facility]    Assessment & Plan     1. Chronic midline low back pain without sciatica  Assessment & Plan:  Discussed physical therapy, patient agreeable, referral placed.  No red flag signs    Orders:  -     Ambulatory Referral to Physical Therapy; Future    2. Hypothyroidism, unspecified type  Assessment & Plan:  TSH in January was 5.24, currently on levothyroxine 150 mcg daily, patient reports that she does take it half an hour before breakfast.  And spaces out vitamins at least 4 hours.  Will repeat TSH at this time before making any changes in the current regimen    Orders:  -     TSH, 3rd generation; Future    3. Atopic dermatitis, unspecified type  Assessment & Plan:  Currently has a flareup on her left hand, recommend clobetasol ointment for flareup, and then she can go back to Kenalog once the flareup resolves.  Also recommended liberal moisturization with Aquaphor or thick emollients      4. Current moderate episode of major depressive disorder without prior episode (HCC)  Assessment & Plan:  Patient is not taking Lexapro anymore reports improvement in mood.  Not interested in medication at this time.      5. Iron deficiency anemia, unspecified iron deficiency anemia type  Assessment & Plan:  Had hemoglobin of 12 and recent blood work.  Now patient complains of fatigue again, will repeat CBC    Orders:  -     CBC and Platelet; Future    6. Vitamin D deficiency  Assessment & Plan:  Vitamin D supplementation, but has history of vitamin D deficiency.  Will recheck levels    Orders:  -     Vitamin D 25 hydroxy; Future           Subjective      Shekhar to office for follow-up of hypothyroidism and eczema.  She reports initial improvement in fatigue after Venofer infusions, but now she again feels fatigued and tired all the time.   She denies any bleeding, has been menopausal for 2 years.  No bleeding from hemorrhoids either.  She also got labs done in January, and had slightly elevated TSH at that time.  She continues to take the same dose 150 mcg of levothyroxine empty stomach half an hour before breakfast and denies any changes in that regimen.  Also complaining of low back pain and would like to go for physical therapy      Review of Systems   Constitutional:  Positive for fatigue. Negative for chills and fever.   HENT:  Negative for congestion.    Respiratory:  Negative for shortness of breath.    Cardiovascular:  Negative for chest pain.   Gastrointestinal:  Negative for diarrhea, nausea and vomiting.   Genitourinary:  Negative for difficulty urinating.   Skin:  Positive for rash.   Neurological:  Negative for headaches.       Current Outpatient Medications on File Prior to Visit   Medication Sig    levothyroxine 150 mcg tablet Take 1 tablet by mouth once daily    Multiple Vitamin (MULTIVITAMIN) capsule Take 1 capsule by mouth daily    cholecalciferol (VITAMIN D3) 1,000 units tablet Take 1 tablet (1,000 Units total) by mouth daily (Patient not taking: Reported on 2/8/2024)    clobetasol (TEMOVATE) 0.05 % cream Apply topically 2 (two) times a day (Patient not taking: Reported on 2/8/2024)    docusate sodium (COLACE) 100 mg capsule Take 1 capsule by mouth 2 (two) times a day as needed (Patient not taking: Reported on 2/8/2024)    fluticasone (FLONASE) 50 mcg/act nasal spray 1 spray into each nostril daily (Patient not taking: Reported on 2/8/2024)    loratadine (CLARITIN) 10 mg tablet Take 1 tablet (10 mg total) by mouth daily (Patient not taking: Reported on 2/8/2024)    triamcinolone (KENALOG) 0.1 % ointment Apply topically 2 (two) times a day (Patient not taking: Reported on 2/8/2024)    [DISCONTINUED] escitalopram (LEXAPRO) 5 mg tablet Take 1 tablet (5 mg total) by mouth daily (Patient not taking: Reported on 2/8/2024)     "[DISCONTINUED] nystatin (MYCOSTATIN) cream Apply topically 2 (two) times a day (Patient not taking: Reported on 2/8/2024)    [DISCONTINUED] polyethylene glycol (GLYCOLAX) 17 GM/SCOOP powder Take 17 g by mouth daily PRN (Patient not taking: Reported on 2/8/2024)    [DISCONTINUED] psyllium (METAMUCIL SMOOTH TEXTURE) 28 % packet Take 1 packet by mouth 2 (two) times a day (Patient not taking: Reported on 2/8/2024)    [DISCONTINUED] sodium chloride (OCEAN) 0.65 % nasal spray 1 spray into each nostril as needed for congestion or rhinitis (Patient not taking: Reported on 2/8/2024)       Objective     /71 (BP Location: Right arm, Patient Position: Sitting, Cuff Size: Standard)   Pulse 69   Temp 98.3 °F (36.8 °C)   Resp 18   Ht 5' 4\" (1.626 m)   Wt 68.1 kg (150 lb 3.2 oz)   LMP 08/15/2020 (Approximate)   SpO2 98%   BMI 25.78 kg/m²     Physical Exam  Constitutional:       Appearance: She is well-developed.   HENT:      Head: Normocephalic and atraumatic.      Right Ear: External ear normal.      Left Ear: External ear normal.   Eyes:      Conjunctiva/sclera: Conjunctivae normal.      Pupils: Pupils are equal, round, and reactive to light.   Cardiovascular:      Rate and Rhythm: Normal rate and regular rhythm.      Heart sounds: Normal heart sounds. No murmur heard.     No friction rub.   Pulmonary:      Effort: Pulmonary effort is normal. No respiratory distress.      Breath sounds: Normal breath sounds. No wheezing or rales.   Musculoskeletal:         General: No swelling or tenderness. Normal range of motion.      Cervical back: Normal range of motion and neck supple.   Skin:     General: Skin is warm and dry.      Comments: Hyperpigmented scaly rash on left hand, erythematous scaly rash in arm, anterior chest   Neurological:      Mental Status: She is alert and oriented to person, place, and time.       Makayla Kramer MD    "

## 2024-02-08 NOTE — ASSESSMENT & PLAN NOTE
Currently has a flareup on her left hand, recommend clobetasol ointment for flareup, and then she can go back to Kenalog once the flareup resolves.  Also recommended liberal moisturization with Aquaphor or thick emollients

## 2024-02-08 NOTE — ASSESSMENT & PLAN NOTE
Patient is not taking Lexapro anymore reports improvement in mood.  Not interested in medication at this time.

## 2024-02-08 NOTE — ASSESSMENT & PLAN NOTE
TSH in January was 5.24, currently on levothyroxine 150 mcg daily, patient reports that she does take it half an hour before breakfast.  And spaces out vitamins at least 4 hours.  Will repeat TSH at this time before making any changes in the current regimen

## 2024-02-21 PROBLEM — Z13.6 SCREENING FOR CARDIOVASCULAR CONDITION: Status: RESOLVED | Noted: 2018-03-16 | Resolved: 2024-02-21

## 2024-02-21 PROBLEM — Z01.419 GYNECOLOGIC EXAM NORMAL: Status: RESOLVED | Noted: 2020-09-01 | Resolved: 2024-02-21

## 2024-03-11 ENCOUNTER — APPOINTMENT (OUTPATIENT)
Dept: LAB | Facility: CLINIC | Age: 54
End: 2024-03-11
Payer: COMMERCIAL

## 2024-03-11 DIAGNOSIS — E03.9 HYPOTHYROIDISM, UNSPECIFIED TYPE: ICD-10-CM

## 2024-03-11 DIAGNOSIS — E55.9 VITAMIN D DEFICIENCY: ICD-10-CM

## 2024-03-11 DIAGNOSIS — D50.9 IRON DEFICIENCY ANEMIA, UNSPECIFIED IRON DEFICIENCY ANEMIA TYPE: ICD-10-CM

## 2024-03-11 LAB — TSH SERPL DL<=0.05 MIU/L-ACNC: 7.67 UIU/ML (ref 0.45–4.5)

## 2024-03-11 PROCEDURE — 84443 ASSAY THYROID STIM HORMONE: CPT

## 2024-03-11 PROCEDURE — 36415 COLL VENOUS BLD VENIPUNCTURE: CPT

## 2024-03-13 DIAGNOSIS — E03.9 HYPOTHYROIDISM, UNSPECIFIED TYPE: ICD-10-CM

## 2024-03-13 RX ORDER — LEVOTHYROXINE SODIUM 175 UG/1
175 TABLET ORAL DAILY
Qty: 30 TABLET | Refills: 1 | Status: SHIPPED | OUTPATIENT
Start: 2024-03-13

## 2024-03-15 ENCOUNTER — NURSE TRIAGE (OUTPATIENT)
Age: 54
End: 2024-03-15

## 2024-03-15 NOTE — TELEPHONE ENCOUNTER
----- Message from Eneida Aggarwal sent at 3/15/2024 10:03 AM EDT -----  Patient noticed vaginal spotting this morning and it has happened in the past. Please advise.

## 2024-03-15 NOTE — TELEPHONE ENCOUNTER
"Patient reports spotting after sexual intercourse last night.  She also reports feels like there is a pimple at the opening of vagina.  She is requesting to be seen as she cannot see what is at the opening.     Patient cannot make any of the appointments offered.  She was advised to use a warm compress to draw the contents out of pimple.  She states she will call back for another appt at a later time.  She voiced thanks.     Reason for Disposition   MILD bleeding or SPOTTING and immediately follows first intercourse    Answer Assessment - Initial Assessment Questions  1. AMOUNT: \"Describe the bleeding that you are having.\"     - SPOTTING: spotting, or pinkish / brownish mucous discharge; does not fill panti-liner or pad     - MILD:  less than 1 pad / hour; less than patient's usual menstrual bleeding    - MODERATE: 1-2 pads / hour; 1 menstrual cup every 6 hours; small-medium blood clots (e.g., pea, grape, small coin)    - SEVERE: soaking 2 or more pads/hour for 2 or more hours; 1 menstrual cup every 2 hours; bleeding not contained by pads or continuous red blood from vagina; large blood clots (e.g., golf ball, large coin)       Spotting   2. ONSET: \"When did the bleeding begin?\" \"Is it continuing now?\"      Last night after intercourse   3. MENSTRUAL PERIOD: \"When was the last normal menstrual period?\" \"How is this different than your period?\"      Postmenopausal   4. REGULARITY: \"How regular are your periods?\"      Regular   5. ABDOMINAL PAIN: \"Do you have any pain?\" \"How bad is the pain?\"  (e.g., Scale 1-10; mild, moderate, or severe)    - MILD (1-3): doesn't interfere with normal activities, abdomen soft and not tender to touch     - MODERATE (4-7): interferes with normal activities or awakens from sleep, tender to touch     - SEVERE (8-10): excruciating pain, doubled over, unable to do any normal activities       Mild   6. PREGNANCY: \"Could you be pregnant?\" \"Are you sexually active?\" \"Did you recently give " "birth?\"      N/a  7. BREASTFEEDING: \"Are you breastfeeding?\"      N/a   8. HORMONES: \"Are you taking any hormone medications, prescription or OTC?\" (e.g., birth control pills, estrogen)      N/a  9. BLOOD THINNERS: \"Do you take any blood thinners?\" (e.g., Coumadin/warfarin, Pradaxa/dabigatran, aspirin)      Denies   10. CAUSE: \"What do you think is causing the bleeding?\" (e.g., recent gyn surgery, recent gyn procedure; known bleeding disorder, cervical cancer, polycystic ovarian disease, fibroids)          Just had spotting after sexual intercourse-feels like there is a pimple at the opening of vagina   11. HEMODYNAMIC STATUS: \"Are you weak or feeling lightheaded?\" If Yes, ask: \"Can you stand and walk normally?\"         Denies   12. OTHER SYMPTOMS: \"What other symptoms are you having with the bleeding?\" (e.g., passed tissue, vaginal discharge, fever, menstrual-type cramps)        Denies    Protocols used: Vaginal Bleeding - Abnormal-ADULT-[place]    "

## 2024-04-30 DIAGNOSIS — L25.9 CONTACT DERMATITIS AND ECZEMA: ICD-10-CM

## 2024-05-01 RX ORDER — CLOBETASOL PROPIONATE 0.5 MG/G
CREAM TOPICAL 2 TIMES DAILY
Qty: 30 G | Refills: 0 | Status: SHIPPED | OUTPATIENT
Start: 2024-05-01

## 2024-05-10 ENCOUNTER — APPOINTMENT (OUTPATIENT)
Dept: LAB | Facility: CLINIC | Age: 54
End: 2024-05-10

## 2024-05-10 DIAGNOSIS — E03.9 HYPOTHYROIDISM, UNSPECIFIED TYPE: ICD-10-CM

## 2024-05-10 LAB
25(OH)D3 SERPL-MCNC: 37.9 NG/ML (ref 30–100)
ERYTHROCYTE [DISTWIDTH] IN BLOOD BY AUTOMATED COUNT: 13.6 % (ref 11.6–15.1)
HCT VFR BLD AUTO: 39.2 % (ref 34.8–46.1)
HGB BLD-MCNC: 12.7 G/DL (ref 11.5–15.4)
MCH RBC QN AUTO: 28.8 PG (ref 26.8–34.3)
MCHC RBC AUTO-ENTMCNC: 32.4 G/DL (ref 31.4–37.4)
MCV RBC AUTO: 89 FL (ref 82–98)
PLATELET # BLD AUTO: 308 THOUSANDS/UL (ref 149–390)
PMV BLD AUTO: 9.6 FL (ref 8.9–12.7)
RBC # BLD AUTO: 4.41 MILLION/UL (ref 3.81–5.12)
TSH SERPL DL<=0.05 MIU/L-ACNC: 0.35 UIU/ML (ref 0.45–4.5)
WBC # BLD AUTO: 5.46 THOUSAND/UL (ref 4.31–10.16)

## 2024-05-10 PROCEDURE — 84443 ASSAY THYROID STIM HORMONE: CPT

## 2024-05-10 RX ORDER — LEVOTHYROXINE SODIUM 0.15 MG/1
150 TABLET ORAL DAILY
Qty: 90 TABLET | Refills: 1 | Status: SHIPPED | OUTPATIENT
Start: 2024-05-10

## 2024-06-24 DIAGNOSIS — L25.9 CONTACT DERMATITIS AND ECZEMA: ICD-10-CM

## 2024-06-24 RX ORDER — CLOBETASOL PROPIONATE 0.5 MG/G
CREAM TOPICAL 2 TIMES DAILY
Qty: 30 G | Refills: 0 | Status: SHIPPED | OUTPATIENT
Start: 2024-06-24

## 2024-07-15 ENCOUNTER — HOSPITAL ENCOUNTER (OUTPATIENT)
Dept: RADIOLOGY | Age: 54
Discharge: HOME | End: 2024-07-15
Payer: COMMERCIAL

## 2024-07-15 VITALS — WEIGHT: 143 LBS | BODY MASS INDEX: 24.41 KG/M2 | HEIGHT: 64 IN

## 2024-07-15 DIAGNOSIS — Z12.31 SCREENING MAMMOGRAM FOR BREAST CANCER: ICD-10-CM

## 2024-07-15 PROCEDURE — 77067 SCR MAMMO BI INCL CAD: CPT

## 2024-07-15 PROCEDURE — 77063 BREAST TOMOSYNTHESIS BI: CPT

## 2024-08-14 ENCOUNTER — TELEPHONE (OUTPATIENT)
Dept: FAMILY MEDICINE CLINIC | Facility: CLINIC | Age: 54
End: 2024-08-14

## 2024-08-14 ENCOUNTER — OFFICE VISIT (OUTPATIENT)
Dept: FAMILY MEDICINE CLINIC | Facility: CLINIC | Age: 54
End: 2024-08-14

## 2024-08-14 VITALS
WEIGHT: 138.2 LBS | RESPIRATION RATE: 18 BRPM | HEART RATE: 73 BPM | SYSTOLIC BLOOD PRESSURE: 105 MMHG | OXYGEN SATURATION: 100 % | DIASTOLIC BLOOD PRESSURE: 62 MMHG | BODY MASS INDEX: 23.6 KG/M2 | HEIGHT: 64 IN | TEMPERATURE: 98.6 F

## 2024-08-14 DIAGNOSIS — R07.9 CHEST PAIN, UNSPECIFIED TYPE: Primary | ICD-10-CM

## 2024-08-14 DIAGNOSIS — L25.9 CONTACT DERMATITIS AND ECZEMA: ICD-10-CM

## 2024-08-14 PROCEDURE — 93000 ELECTROCARDIOGRAM COMPLETE: CPT | Performed by: FAMILY MEDICINE

## 2024-08-14 PROCEDURE — 99214 OFFICE O/P EST MOD 30 MIN: CPT | Performed by: FAMILY MEDICINE

## 2024-08-14 RX ORDER — CLOBETASOL PROPIONATE 0.5 MG/G
CREAM TOPICAL 2 TIMES DAILY
Qty: 30 G | Refills: 0 | Status: SHIPPED | OUTPATIENT
Start: 2024-08-14

## 2024-08-14 RX ORDER — LIDOCAINE 40 MG/G
CREAM TOPICAL AS NEEDED
Qty: 15 G | Refills: 1 | Status: SHIPPED | OUTPATIENT
Start: 2024-08-14

## 2024-08-14 RX ORDER — PANTOPRAZOLE SODIUM 20 MG/1
20 TABLET, DELAYED RELEASE ORAL
Qty: 30 TABLET | Refills: 0 | Status: SHIPPED | OUTPATIENT
Start: 2024-08-14 | End: 2024-09-13

## 2024-08-14 NOTE — TELEPHONE ENCOUNTER
Patient calling, states she had intermittent chest pain for approx 4 hours this morning. She has no other symptoms, no cough or shortness of breath. Suggested ER, however patient declined. Scheduled to see you today @ 3:00pm

## 2024-08-14 NOTE — ASSESSMENT & PLAN NOTE
Recommended pt to use clobetasol during flare-up only.   Use emollient like Aquaphor, Vaseline along with Kenalog

## 2024-08-14 NOTE — ASSESSMENT & PLAN NOTE
EKG -sinus bradycardia, same as previous EKG, no new changes. Likely because of hypothyroidism.   Ruled out - cardiac cause  No travel history, SOB, fever, productive cough - ruled out - pulmonary cause    Ddx: Musculoskeletal, acid reflux    PLAN  -Pantoprazole daily 15min before breakfast  -Lidocaine cream   -Follow-up in 2 weeks  -Symptoms persist/worsens - follow up or go to ED

## 2024-08-14 NOTE — PROGRESS NOTES
Ambulatory Visit  Name: Shekhar Nance      : 1970      MRN: [id number]  Encounter Provider: Daly Cespedes  Encounter Date: 2024   Encounter department: [facility]    Assessment & Plan   1. Chest pain, unspecified type  Assessment & Plan:    EKG -sinus bradycardia, same as previous EKG, no new changes. Likely because of hypothyroidism.   Ruled out - cardiac cause  No travel history, SOB, fever, productive cough - ruled out - pulmonary cause    Ddx: Musculoskeletal, acid reflux    PLAN  -Pantoprazole daily 15min before breakfast  -Lidocaine cream   -Follow-up in 2 weeks  -Symptoms persist/worsens - follow up or go to ED  Orders:  -     POCT ECG  -     pantoprazole (PROTONIX) 20 mg tablet; Take 1 tablet (20 mg total) by mouth daily before breakfast  -     lidocaine (LMX) 4 % cream; Apply topically as needed for mild pain  2. Contact dermatitis and eczema  Assessment & Plan:  Recommended pt to use clobetasol during flare-up only.   Use emollient like Aquaphor, Vaseline along with Kenalog  Orders:  -     clobetasol (TEMOVATE) 0.05 % cream; Apply topically 2 (two) times a day       History of Present Illness     Mr. Figueroa 54-year-old female with P/M/H hypothyroid, vitamin D deficiency, iron deficiency, eczema who was seen in our clinic for chest pain.  Patient states chest pain started this morning when she was at work, located under left breast, pain is like needle-sensation, last for few seconds, had 6 episodes till 12 pm.  Patient states on deep breath it increases and there is uncomfortable sensation. Not associated with nausea, vomiting, sweating, palpitation, dizziness, lightheadedness, shortness of breath. Denies fever, leg swellings, reflux, trauma, travel history. Pt states of having dry cough for 2-3 days, but not much. Pt states she was using lawn  on Monday and that might have caused strain and pain. Pt was currently experiencing chest pain in  "office. Pt requested refill for clobetasol.    Chest Pain   Associated symptoms include a cough (dry. But not much). Pertinent negatives include no abdominal pain, diaphoresis, dizziness, fever, headaches, nausea, shortness of breath or vomiting.       Review of Systems   Constitutional:  Positive for fatigue. Negative for chills, diaphoresis and fever.   HENT:  Negative for ear pain, rhinorrhea and sore throat.    Eyes:  Negative for photophobia and pain.   Respiratory:  Positive for cough (dry. But not much). Negative for shortness of breath.    Cardiovascular:  Positive for chest pain.   Gastrointestinal:  Negative for abdominal pain, constipation, diarrhea, nausea and vomiting.   Endocrine: Negative for polyuria.   Genitourinary:  Negative for dysuria and urgency.   Skin:  Negative for wound.   Neurological:  Negative for dizziness, light-headedness and headaches.   Psychiatric/Behavioral:  Negative for confusion and decreased concentration.        Objective     /62 (BP Location: Left arm, Patient Position: Sitting, Cuff Size: Standard)   Pulse 73   Temp 98.6 °F (37 °C) (Temporal)   Resp 18   Ht 5' 4\" (1.626 m)   Wt 62.7 kg (138 lb 3.2 oz)   LMP 08/15/2020 (Approximate)   SpO2 100%   BMI 23.72 kg/m²     Physical Exam  Constitutional:       Appearance: Normal appearance.   HENT:      Head: Normocephalic and atraumatic.      Nose: No congestion.      Mouth/Throat:      Pharynx: Oropharynx is clear.   Eyes:      Conjunctiva/sclera: Conjunctivae normal.   Cardiovascular:      Rate and Rhythm: Regular rhythm. Bradycardia present.      Pulses: Normal pulses.      Heart sounds: Normal heart sounds.      Comments: EKG - sinus bradycardia, same as previous EKG, No new changes.  Pulmonary:      Effort: Pulmonary effort is normal. No respiratory distress.      Breath sounds: Normal breath sounds.   Chest:      Chest wall: No tenderness.   Abdominal:      General: Bowel sounds are normal.      Palpations: " Abdomen is soft.      Tenderness: There is no abdominal tenderness.   Musculoskeletal:         General: No swelling or tenderness. Normal range of motion.      Cervical back: Normal range of motion.   Skin:     General: Skin is warm.      Findings: Rash (eczematous rash over dorsum of hand and wrist B/L) present.   Neurological:      General: No focal deficit present.      Mental Status: She is alert and oriented to person, place, and time.   Psychiatric:         Mood and Affect: Mood normal.         Behavior: Behavior normal.       Administrative Statements

## 2024-09-10 ENCOUNTER — OFFICE VISIT (OUTPATIENT)
Dept: FAMILY MEDICINE CLINIC | Facility: CLINIC | Age: 54
End: 2024-09-10

## 2024-09-10 VITALS
TEMPERATURE: 98.3 F | SYSTOLIC BLOOD PRESSURE: 99 MMHG | RESPIRATION RATE: 18 BRPM | HEIGHT: 64 IN | DIASTOLIC BLOOD PRESSURE: 53 MMHG | BODY MASS INDEX: 23.12 KG/M2 | HEART RATE: 68 BPM | WEIGHT: 135.4 LBS | OXYGEN SATURATION: 99 %

## 2024-09-10 DIAGNOSIS — Z13.6 SCREENING FOR CARDIOVASCULAR CONDITION: ICD-10-CM

## 2024-09-10 DIAGNOSIS — D50.9 IRON DEFICIENCY ANEMIA, UNSPECIFIED IRON DEFICIENCY ANEMIA TYPE: ICD-10-CM

## 2024-09-10 DIAGNOSIS — M79.671 RIGHT FOOT PAIN: ICD-10-CM

## 2024-09-10 DIAGNOSIS — Z00.00 ANNUAL PHYSICAL EXAM: Primary | ICD-10-CM

## 2024-09-10 DIAGNOSIS — E53.8 B12 DEFICIENCY: ICD-10-CM

## 2024-09-10 DIAGNOSIS — E03.9 HYPOTHYROIDISM, UNSPECIFIED TYPE: ICD-10-CM

## 2024-09-10 DIAGNOSIS — L20.9 ATOPIC DERMATITIS, UNSPECIFIED TYPE: ICD-10-CM

## 2024-09-10 PROCEDURE — 99396 PREV VISIT EST AGE 40-64: CPT | Performed by: FAMILY MEDICINE

## 2024-09-10 RX ORDER — TRIAMCINOLONE ACETONIDE 1 MG/G
OINTMENT TOPICAL 2 TIMES DAILY
Qty: 80 G | Refills: 0 | Status: SHIPPED | OUTPATIENT
Start: 2024-09-10

## 2024-09-10 NOTE — ASSESSMENT & PLAN NOTE
Stable with Kenalog and clobetasol as needed.  Recommend liberal moisturization with Aquaphor or thick emollients    Orders:    triamcinolone (KENALOG) 0.1 % ointment; Apply topically 2 (two) times a day

## 2024-09-10 NOTE — PROGRESS NOTES
Adult Annual Physical  Name: Shekhar Nance      : 1970      MRN: [id number]  Encounter Provider: Makayla Kramer MD  Encounter Date: 9/10/2024   Encounter department: [facility]    Assessment & Plan  Annual physical exam  Up-to-date with Pap smear, colonoscopy, mammogram  Will check lipid panel, CMP  Orders:    Lipid panel; Future    Hypothyroidism, unspecified type  Currently on levothyroxine 150 mcg, with weight loss will check TSH    Orders:    TSH, 3rd generation; Future    Iron deficiency anemia, unspecified iron deficiency anemia type  Denies any bleeding from hemorrhoids, will check CBC and iron panel    Orders:    CBC and differential; Future    Iron Panel (Includes Ferritin, Iron Sat%, Iron, and TIBC); Future    Screening for cardiovascular condition    Orders:    Comprehensive metabolic panel; Future    Atopic dermatitis, unspecified type  Stable with Kenalog and clobetasol as needed.  Recommend liberal moisturization with Aquaphor or thick emollients    Orders:    triamcinolone (KENALOG) 0.1 % ointment; Apply topically 2 (two) times a day    B12 deficiency    Orders:    Vitamin B12; Future    Right foot pain  Likely secondary to tight shoes, no erythema noted on examination,pain is at the lateral aspect of foot  Follow-up as needed         Immunizations and preventive care screenings were discussed with patient today. Appropriate education was printed on patient's after visit summary.    Counseling:  Exercise: the importance of regular exercise/physical activity was discussed. Recommend exercise 3-5 times per week for at least 30 minutes.          History of Present Illness     Adult Annual Physical:  Patient presents for annual physical. Shekhar presented to office for annual physical.  She reports she has lost 15 pounds since the last visit in February, she has made some changes in her lifestyle, but is worried about her thyroid.  The medication was adjusted 3  "months ago, and she is wondering if it needs further adjustment.  She is also concerned about pain in right foot, but denies any trauma or injury.  She did put in a sole on her shoe that has increased pain, and has started a new job which requires her to be on her feet most of the day.     Diet and Physical Activity:  - Diet/Nutrition: well balanced diet.  - Exercise: walking.    General Health:  - Sleep: 4-6 hours of sleep on average.  - Hearing: decreased hearing right ear.  - Vision: wears glasses and most recent eye exam < 1 year ago.  - Dental: regular dental visits.    /GYN Health:  - Follows with GYN: no.   - Menopause: postmenopausal.   - History of STDs: no    Advanced Care Planning:  - Has an advanced directive?: no    - Has a durable medical POA?: no    - ACP document given to patient?: no      Review of Systems   Constitutional:  Negative for chills and fever.   HENT:  Negative for congestion.    Respiratory:  Negative for shortness of breath.    Cardiovascular:  Negative for chest pain.   Gastrointestinal:  Negative for diarrhea, nausea and vomiting.   Genitourinary:  Negative for difficulty urinating.   Musculoskeletal:         Foot pain   Neurological:  Negative for headaches.         Objective     BP 99/53 (BP Location: Left arm, Patient Position: Sitting, Cuff Size: Standard)   Pulse 68   Temp 98.3 °F (36.8 °C) (Temporal)   Resp 18   Ht 5' 4\" (1.626 m)   Wt 61.4 kg (135 lb 6.4 oz)   LMP 08/15/2020 (Approximate)   SpO2 99%   BMI 23.24 kg/m²     Physical Exam  Constitutional:       Appearance: She is well-developed.   HENT:      Head: Normocephalic and atraumatic.      Right Ear: External ear normal.      Left Ear: External ear normal.   Eyes:      Conjunctiva/sclera: Conjunctivae normal.      Pupils: Pupils are equal, round, and reactive to light.   Cardiovascular:      Rate and Rhythm: Normal rate and regular rhythm.      Heart sounds: Normal heart sounds. No murmur heard.     No friction " rub.   Pulmonary:      Effort: Pulmonary effort is normal. No respiratory distress.      Breath sounds: Normal breath sounds. No wheezing or rales.   Abdominal:      General: Bowel sounds are normal. There is no distension.      Palpations: Abdomen is soft.      Tenderness: There is no abdominal tenderness.   Musculoskeletal:         General: Normal range of motion.      Cervical back: Normal range of motion and neck supple.      Comments: No erythema or tenderness noted on right foot   Skin:     General: Skin is warm and dry.   Neurological:      Mental Status: She is alert and oriented to person, place, and time.

## 2024-09-10 NOTE — PATIENT INSTRUCTIONS
"Patient Education     Routine physical for adults   The Basics   Written by the doctors and editors at [facility]   What is a physical? -- A physical is a routine visit, or \"check-up,\" with your doctor. You might also hear it called a \"wellness visit\" or \"preventive visit.\"  During each visit, the doctor will:   Ask about your physical and mental health   Ask about your habits, behaviors, and lifestyle   Do an exam   Give you vaccines if needed   Talk to you about any medicines you take   Give advice about your health   Answer your questions  Getting regular check-ups is an important part of taking care of your health. It can help your doctor find and treat any problems you have. But it's also important for preventing health problems.  A routine physical is different from a \"sick visit.\" A sick visit is when you see a doctor because of a health concern or problem. Since physicals are scheduled ahead of time, you can think about what you want to ask the doctor.  How often should I get a physical? -- It depends on your age and health. In general, for people age 21 years and older:   If you are younger than 50 years, you might be able to get a physical every 3 years.   If you are 50 years or older, your doctor might recommend a physical every year.  If you have an ongoing health condition, like diabetes or high blood pressure, your doctor will probably want to see you more often.  What happens during a physical? -- In general, each visit will include:   Physical exam - The doctor or nurse will check your height, weight, heart rate, and blood pressure. They will also look at your eyes and ears. They will ask about how you are feeling and whether you have any symptoms that bother you.   Medicines - It's a good idea to bring a list of all the medicines you take to each doctor visit. Your doctor will talk to you about your medicines and answer any questions. Tell them if you are having any side effects that bother you. You " "should also tell them if you are having trouble paying for any of your medicines.   Habits and behaviors - This includes:   Your diet   Your exercise habits   Whether you smoke, drink alcohol, or use drugs   Whether you are sexually active   Whether you feel safe at home  Your doctor will talk to you about things you can do to improve your health and lower your risk of health problems. They will also offer help and support. For example, if you want to quit smoking, they can give you advice and might prescribe medicines. If you want to improve your diet or get more physical activity, they can help you with this, too.   Lab tests, if needed - The tests you get will depend on your age and situation. For example, your doctor might want to check your:   Cholesterol   Blood sugar   Iron level   Vaccines - The recommended vaccines will depend on your age, health, and what vaccines you already had. Vaccines are very important because they can prevent certain serious or deadly infections.   Discussion of screening - \"Screening\" means checking for diseases or other health problems before they cause symptoms. Your doctor can recommend screening based on your age, risk, and preferences. This might include tests to check for:   Cancer, such as breast, prostate, cervical, ovarian, colorectal, prostate, lung, or skin cancer   Sexually transmitted infections, such as chlamydia and gonorrhea   Mental health conditions like depression and anxiety  Your doctor will talk to you about the different types of screening tests. They can help you decide which screenings to have. They can also explain what the results might mean.   Answering questions - The physical is a good time to ask the doctor or nurse questions about your health. If needed, they can refer you to other doctors or specialists, too.  Adults older than 65 years often need other care, too. As you get older, your doctor will talk to you about:   How to prevent falling at " home   Hearing or vision tests   Memory testing   How to take your medicines safely   Making sure that you have the help and support you need at home  All topics are updated as new evidence becomes available and our peer review process is complete.  This topic retrieved from [employer] on: May 02, 2024.  Topic 487453 Version 1.0  Release: 32.4.3 - C32.122  © 2024 UpToDate, Inc. and/or its affiliates. All rights reserved.  Consumer Information Use and Disclaimer   Disclaimer: This generalized information is a limited summary of diagnosis, treatment, and/or medication information. It is not meant to be comprehensive and should be used as a tool to help the user understand and/or assess potential diagnostic and treatment options. It does NOT include all information about conditions, treatments, medications, side effects, or risks that may apply to a specific patient. It is not intended to be medical advice or a substitute for the medical advice, diagnosis, or treatment of a health care provider based on the health care provider's examination and assessment of a patient's specific and unique circumstances. Patients must speak with a health care provider for complete information about their health, medical questions, and treatment options, including any risks or benefits regarding use of medications. This information does not endorse any treatments or medications as safe, effective, or approved for treating a specific patient. UpToDate, Inc. and its affiliates disclaim any warranty or liability relating to this information or the use thereof.The use of this information is governed by the Terms of Use, available at https://www.[employer]uwer.com/en/know/clinical-effectiveness-terms. 2024© UpToDate, Inc. and its affiliates and/or licensors. All rights reserved.  Copyright   © 2024 UpToDate, Inc. and/or its affiliates. All rights reserved.

## 2024-09-10 NOTE — ASSESSMENT & PLAN NOTE
Currently on levothyroxine 150 mcg, with weight loss will check TSH    Orders:    TSH, 3rd generation; Future

## 2024-09-10 NOTE — ASSESSMENT & PLAN NOTE
Denies any bleeding from hemorrhoids, will check CBC and iron panel    Orders:    CBC and differential; Future    Iron Panel (Includes Ferritin, Iron Sat%, Iron, and TIBC); Future

## 2024-09-16 ENCOUNTER — APPOINTMENT (OUTPATIENT)
Dept: LAB | Facility: CLINIC | Age: 54
End: 2024-09-16
Payer: COMMERCIAL

## 2024-09-16 DIAGNOSIS — E03.9 HYPOTHYROIDISM, UNSPECIFIED TYPE: ICD-10-CM

## 2024-09-16 DIAGNOSIS — D50.9 IRON DEFICIENCY ANEMIA, UNSPECIFIED IRON DEFICIENCY ANEMIA TYPE: ICD-10-CM

## 2024-09-16 DIAGNOSIS — Z00.00 ANNUAL PHYSICAL EXAM: ICD-10-CM

## 2024-09-16 DIAGNOSIS — Z13.6 SCREENING FOR CARDIOVASCULAR CONDITION: ICD-10-CM

## 2024-09-16 DIAGNOSIS — E53.8 B12 DEFICIENCY: ICD-10-CM

## 2024-09-16 LAB
ALBUMIN SERPL BCG-MCNC: 4 G/DL (ref 3.5–5)
ALP SERPL-CCNC: 58 U/L (ref 34–104)
ALT SERPL W P-5'-P-CCNC: 12 U/L (ref 7–52)
ANION GAP SERPL CALCULATED.3IONS-SCNC: 5 MMOL/L (ref 4–13)
AST SERPL W P-5'-P-CCNC: 16 U/L (ref 13–39)
BASOPHILS # BLD AUTO: 0.03 THOUSANDS/ΜL (ref 0–0.1)
BASOPHILS NFR BLD AUTO: 1 % (ref 0–1)
BILIRUB SERPL-MCNC: 0.54 MG/DL (ref 0.2–1)
BUN SERPL-MCNC: 13 MG/DL (ref 5–25)
CALCIUM SERPL-MCNC: 9.3 MG/DL (ref 8.4–10.2)
CHLORIDE SERPL-SCNC: 105 MMOL/L (ref 96–108)
CHOLEST SERPL-MCNC: 164 MG/DL
CO2 SERPL-SCNC: 30 MMOL/L (ref 21–32)
CREAT SERPL-MCNC: 0.72 MG/DL (ref 0.6–1.3)
EOSINOPHIL # BLD AUTO: 0.27 THOUSAND/ΜL (ref 0–0.61)
EOSINOPHIL NFR BLD AUTO: 6 % (ref 0–6)
ERYTHROCYTE [DISTWIDTH] IN BLOOD BY AUTOMATED COUNT: 14.2 % (ref 11.6–15.1)
FERRITIN SERPL-MCNC: 40 NG/ML (ref 11–307)
GFR SERPL CREATININE-BSD FRML MDRD: 96 ML/MIN/1.73SQ M
GLUCOSE P FAST SERPL-MCNC: 91 MG/DL (ref 65–99)
HCT VFR BLD AUTO: 38.2 % (ref 34.8–46.1)
HDLC SERPL-MCNC: 54 MG/DL
HGB BLD-MCNC: 12.2 G/DL (ref 11.5–15.4)
IMM GRANULOCYTES # BLD AUTO: 0.01 THOUSAND/UL (ref 0–0.2)
IMM GRANULOCYTES NFR BLD AUTO: 0 % (ref 0–2)
IRON SATN MFR SERPL: 32 % (ref 15–50)
IRON SERPL-MCNC: 103 UG/DL (ref 50–212)
LDLC SERPL CALC-MCNC: 90 MG/DL (ref 0–100)
LYMPHOCYTES # BLD AUTO: 1.32 THOUSANDS/ΜL (ref 0.6–4.47)
LYMPHOCYTES NFR BLD AUTO: 27 % (ref 14–44)
MCH RBC QN AUTO: 28 PG (ref 26.8–34.3)
MCHC RBC AUTO-ENTMCNC: 31.9 G/DL (ref 31.4–37.4)
MCV RBC AUTO: 88 FL (ref 82–98)
MONOCYTES # BLD AUTO: 0.4 THOUSAND/ΜL (ref 0.17–1.22)
MONOCYTES NFR BLD AUTO: 8 % (ref 4–12)
NEUTROPHILS # BLD AUTO: 2.87 THOUSANDS/ΜL (ref 1.85–7.62)
NEUTS SEG NFR BLD AUTO: 58 % (ref 43–75)
NONHDLC SERPL-MCNC: 110 MG/DL
NRBC BLD AUTO-RTO: 0 /100 WBCS
PLATELET # BLD AUTO: 248 THOUSANDS/UL (ref 149–390)
PMV BLD AUTO: 11.1 FL (ref 8.9–12.7)
POTASSIUM SERPL-SCNC: 3.9 MMOL/L (ref 3.5–5.3)
PROT SERPL-MCNC: 7.9 G/DL (ref 6.4–8.4)
RBC # BLD AUTO: 4.35 MILLION/UL (ref 3.81–5.12)
SODIUM SERPL-SCNC: 140 MMOL/L (ref 135–147)
TIBC SERPL-MCNC: 317 UG/DL (ref 250–450)
TRIGL SERPL-MCNC: 101 MG/DL
TSH SERPL DL<=0.05 MIU/L-ACNC: 1.5 UIU/ML (ref 0.45–4.5)
UIBC SERPL-MCNC: 214 UG/DL (ref 155–355)
VIT B12 SERPL-MCNC: 321 PG/ML (ref 180–914)
WBC # BLD AUTO: 4.9 THOUSAND/UL (ref 4.31–10.16)

## 2024-09-16 PROCEDURE — 80061 LIPID PANEL: CPT

## 2024-09-16 PROCEDURE — 82728 ASSAY OF FERRITIN: CPT

## 2024-09-16 PROCEDURE — 85025 COMPLETE CBC W/AUTO DIFF WBC: CPT

## 2024-09-16 PROCEDURE — 84443 ASSAY THYROID STIM HORMONE: CPT

## 2024-09-16 PROCEDURE — 82607 VITAMIN B-12: CPT

## 2024-09-16 PROCEDURE — 80053 COMPREHEN METABOLIC PANEL: CPT

## 2024-09-16 PROCEDURE — 83550 IRON BINDING TEST: CPT

## 2024-09-16 PROCEDURE — 83540 ASSAY OF IRON: CPT

## 2024-09-16 PROCEDURE — 36415 COLL VENOUS BLD VENIPUNCTURE: CPT

## 2024-10-29 DIAGNOSIS — L25.9 CONTACT DERMATITIS AND ECZEMA: ICD-10-CM

## 2024-10-29 RX ORDER — CLOBETASOL PROPIONATE 0.5 MG/G
CREAM TOPICAL 2 TIMES DAILY
Qty: 30 G | Refills: 0 | Status: SHIPPED | OUTPATIENT
Start: 2024-10-29

## 2024-11-08 NOTE — PROGRESS NOTES
Assessment & Plan   Problem List Items Addressed This Visit    None  Visit Diagnoses       Well woman exam    -  Primary    Cystocele with rectocele        Relevant Orders    Ambulatory Referral to Physical Therapy            Discussion    All questions have been answered to her satisfaction  RTO for APE or sooner if needed  Pap deferred.   Pt notes although her sx have improves she is still having some constipation and feelings of vaginal fullness at the end of the day. She has not been to pelvic floor PT in awhile and desires to reestablish care with them.   We did reviewed options. Aware if sx do not resolve would plan urogyn consult as previously advised. Pt agreeable.       Subjective     HPI   Shekhar Nance is a 54 y.o. female who presents for annual well woman exam.     LMP - 02/2020 ; Denies  bleeding    Pt notes that was d/w cystocele and rectocele last year. She notes that her sx have improved. She has some constipation.   Saw pelvic floor PT and that has been successful     She notes that sometimes she will have some vaginal itching. Will use OTC hydrocortisone w relief of sx. Will us witch hazel w relief also.     No concerning breast masses, asymmetry, nipple discharge or bleeding, changes in skin of breast, or breast tenderness bilaterally    No abd/pelvic pain or HAs;     No menopausal symptoms.    Pt is sexually active in a mutually monog/ sexual relationship x 28 years.  No issues with intercourse; She declines sti/hiv/hep testing; Feels safe at home  Current contraception: NA    (+) PCP for routine Bw/care;    Last Pap : 3/21/23 WNL neg HPV  History of abnormal Pap smear: denies   Mammo:07/15/24 BIRADS 1   Abnormal mammo: denies   Colonoscopy:11/2019 f/u 10 years     Review of Systems   Constitutional: Negative.    Respiratory: Negative.     Gastrointestinal: Negative.    Endocrine: Negative.    Genitourinary: Negative.        The following portions of the patient's history were reviewed  and updated as appropriate: allergies, current medications, past family history, past medical history, past social history, past surgical history, and problem list.         OB History          2    Para   2    Term   2            AB        Living   2         SAB        IAB        Ectopic        Multiple        Live Births   2           Obstetric Comments   :   F;   M               Past Medical History:   Diagnosis Date    Allergic     Anemia     iron def    Disease of thyroid gland     Dyspareunia in female     Fatigue     Hemorrhoids     Hypothyroidism     Rectal bleeding     Varicose veins of both legs with edema        Past Surgical History:   Procedure Laterality Date    COLONOSCOPY      COLONOSCOPY  11/15/2019    10 YEAR RECOMMENDED       Family History   Problem Relation Age of Onset    Hypertension Mother     Asthma Father     No Known Problems Sister     No Known Problems Daughter     No Known Problems Maternal Grandmother     No Known Problems Maternal Grandfather     No Known Problems Paternal Grandmother     No Known Problems Paternal Grandfather     No Known Problems Brother     No Known Problems Son     No Known Problems Maternal Aunt     No Known Problems Maternal Aunt     No Known Problems Paternal Aunt     No Known Problems Paternal Aunt     No Known Problems Paternal Aunt        Social History     Socioeconomic History    Marital status: /Civil Union     Spouse name: Not on file    Number of children: Not on file    Years of education: Not on file    Highest education level: Not on file   Occupational History    Not on file   Tobacco Use    Smoking status: Never    Smokeless tobacco: Never   Vaping Use    Vaping status: Never Used   Substance and Sexual Activity    Alcohol use: No    Drug use: No    Sexual activity: Yes     Partners: Male   Other Topics Concern    Not on file   Social History Narrative    Not on file     Social Determinants of Health      Financial Resource Strain: Low Risk  (2/8/2024)    Overall Financial Resource Strain (CARDIA)     Difficulty of Paying Living Expenses: Not hard at all   Food Insecurity: No Food Insecurity (2/8/2024)    Nursing - Inadequate Food Risk Classification     Worried About Running Out of Food in the Last Year: Never true     Ran Out of Food in the Last Year: Never true     Ran Out of Food in the Last Year: Not on file   Transportation Needs: No Transportation Needs (2/8/2024)    PRAPARE - Transportation     Lack of Transportation (Medical): No     Lack of Transportation (Non-Medical): No   Physical Activity: Insufficiently Active (2/8/2024)    Exercise Vital Sign     Days of Exercise per Week: 3 days     Minutes of Exercise per Session: 30 min   Stress: No Stress Concern Present (2/8/2024)    [employer] [place] [employer] - Occupational Stress Questionnaire     Feeling of Stress : Not at all   Social Connections: Not on file   Intimate Partner Violence: Not At Risk (8/14/2024)    Humiliation, Afraid, Rape, and Kick questionnaire     Fear of Current or Ex-Partner: No     Emotionally Abused: No     Physically Abused: No     Sexually Abused: No   Housing Stability: Low Risk  (2/8/2024)    Housing Stability Vital Sign     Unable to Pay for Housing in the Last Year: No     Number of Times Moved in the Last Year: 1     Homeless in the Last Year: No         Current Outpatient Medications:     clobetasol (TEMOVATE) 0.05 % cream, Apply topically 2 (two) times a day, Disp: 30 g, Rfl: 0    levothyroxine 150 mcg tablet, Take 1 tablet (150 mcg total) by mouth daily, Disp: 90 tablet, Rfl: 1    lidocaine (LMX) 4 % cream, Apply topically as needed for mild pain, Disp: 15 g, Rfl: 1    triamcinolone (KENALOG) 0.1 % ointment, Apply topically 2 (two) times a day, Disp: 80 g, Rfl: 0    cholecalciferol (VITAMIN D3) 1,000 units tablet, Take 1 tablet (1,000 Units total) by mouth daily (Patient not taking: Reported on 2/8/2024),  Disp: 90 tablet, Rfl: 1    docusate sodium (COLACE) 100 mg capsule, Take 1 capsule by mouth 2 (two) times a day as needed (Patient not taking: Reported on 2/8/2024), Disp: , Rfl:     fluticasone (FLONASE) 50 mcg/act nasal spray, 1 spray into each nostril daily (Patient not taking: Reported on 2/8/2024), Disp: 9.9 mL, Rfl: 0    loratadine (CLARITIN) 10 mg tablet, Take 1 tablet (10 mg total) by mouth daily (Patient not taking: Reported on 2/8/2024), Disp: 90 tablet, Rfl: 1    Multiple Vitamin (MULTIVITAMIN) capsule, Take 1 capsule by mouth daily (Patient not taking: Reported on 8/14/2024), Disp: 90 capsule, Rfl: 3    pantoprazole (PROTONIX) 20 mg tablet, Take 1 tablet (20 mg total) by mouth daily before breakfast (Patient not taking: Reported on 9/10/2024), Disp: 30 tablet, Rfl: 0    Allergies   Allergen Reactions    Amoxicillin     Kiwi Extract - Food Allergy     Sulfa Antibiotics        Objective   Vitals:    11/11/24 1330   BP: 122/68   BP Location: Left arm   Patient Position: Sitting   Cuff Size: Standard   Weight: 60.8 kg (134 lb)     Physical Exam  Vitals reviewed.   HENT:      Head: Normocephalic and atraumatic.   Cardiovascular:      Rate and Rhythm: Normal rate and regular rhythm.   Pulmonary:      Effort: Pulmonary effort is normal.      Breath sounds: Normal breath sounds.   Chest:   Breasts:     Breasts are symmetrical.      Right: No swelling, bleeding, inverted nipple, mass, nipple discharge, skin change or tenderness.      Left: No swelling, bleeding, inverted nipple, mass, nipple discharge, skin change or tenderness.   Abdominal:      General: Abdomen is flat. Bowel sounds are normal.      Palpations: Abdomen is soft.      Tenderness: There is no abdominal tenderness. There is no right CVA tenderness, left CVA tenderness or guarding.   Genitourinary:     General: Normal vulva.      Pubic Area: No rash.       Labia:         Right: No rash, tenderness, lesion or injury.         Left: No rash,  tenderness, lesion or injury.       Urethra: No prolapse, urethral pain, urethral swelling or urethral lesion.      Vagina: Normal. No signs of injury and foreign body. No vaginal discharge or erythema.      Cervix: Normal.      Uterus: Normal.       Adnexa: Right adnexa normal and left adnexa normal.      Comments: + moderate cystocele noted on exam today. No significant rectocele noted although rectum is not full of stool at this point either.   Musculoskeletal:      Cervical back: Neck supple.   Lymphadenopathy:      Upper Body:      Right upper body: No axillary adenopathy.      Left upper body: No axillary adenopathy.   Skin:     General: Skin is warm and dry.   Neurological:      Mental Status: She is alert and oriented to person, place, and time.   Psychiatric:         Mood and Affect: Mood normal.         Behavior: Behavior normal.         Thought Content: Thought content normal.         Judgment: Judgment normal.         There are no Patient Instructions on file for this visit.

## 2024-11-11 ENCOUNTER — ANNUAL EXAM (OUTPATIENT)
Dept: OBGYN CLINIC | Facility: CLINIC | Age: 54
End: 2024-11-11
Payer: COMMERCIAL

## 2024-11-11 VITALS — WEIGHT: 134 LBS | DIASTOLIC BLOOD PRESSURE: 68 MMHG | BODY MASS INDEX: 23 KG/M2 | SYSTOLIC BLOOD PRESSURE: 122 MMHG

## 2024-11-11 DIAGNOSIS — N81.10 CYSTOCELE WITH RECTOCELE: ICD-10-CM

## 2024-11-11 DIAGNOSIS — N81.6 CYSTOCELE WITH RECTOCELE: ICD-10-CM

## 2024-11-11 DIAGNOSIS — Z01.419 WELL WOMAN EXAM: Primary | ICD-10-CM

## 2024-11-11 PROCEDURE — 99396 PREV VISIT EST AGE 40-64: CPT | Performed by: PHYSICIAN ASSISTANT

## 2024-11-13 DIAGNOSIS — E03.9 HYPOTHYROIDISM, UNSPECIFIED TYPE: ICD-10-CM

## 2024-11-13 RX ORDER — LEVOTHYROXINE SODIUM 150 UG/1
150 TABLET ORAL DAILY
Qty: 90 TABLET | Refills: 0 | Status: SHIPPED | OUTPATIENT
Start: 2024-11-13

## 2024-11-26 ENCOUNTER — TELEPHONE (OUTPATIENT)
Dept: FAMILY MEDICINE CLINIC | Facility: CLINIC | Age: 54
End: 2024-11-26

## 2024-11-26 NOTE — TELEPHONE ENCOUNTER
Request from Patient to have Dr Kramer call her   She has been losing weight and is still very tired   Should she be having another test done

## 2024-12-02 DIAGNOSIS — E03.9 HYPOTHYROIDISM, UNSPECIFIED TYPE: Primary | ICD-10-CM

## 2024-12-10 ENCOUNTER — EVALUATION (OUTPATIENT)
Dept: PHYSICAL THERAPY | Facility: REHABILITATION | Age: 54
End: 2024-12-10
Payer: COMMERCIAL

## 2024-12-10 DIAGNOSIS — N81.6 CYSTOCELE WITH RECTOCELE: ICD-10-CM

## 2024-12-10 DIAGNOSIS — R10.2 PELVIC PRESSURE IN FEMALE: Primary | ICD-10-CM

## 2024-12-10 DIAGNOSIS — N81.10 CYSTOCELE WITH RECTOCELE: ICD-10-CM

## 2024-12-10 PROCEDURE — 97162 PT EVAL MOD COMPLEX 30 MIN: CPT | Performed by: PHYSICAL THERAPIST

## 2024-12-10 PROCEDURE — 97530 THERAPEUTIC ACTIVITIES: CPT | Performed by: PHYSICAL THERAPIST

## 2024-12-12 ENCOUNTER — OFFICE VISIT (OUTPATIENT)
Dept: PHYSICAL THERAPY | Facility: REHABILITATION | Age: 54
End: 2024-12-12
Payer: COMMERCIAL

## 2024-12-12 DIAGNOSIS — R10.2 PELVIC PRESSURE IN FEMALE: ICD-10-CM

## 2024-12-12 DIAGNOSIS — N81.6 CYSTOCELE WITH RECTOCELE: Primary | ICD-10-CM

## 2024-12-12 DIAGNOSIS — N81.10 CYSTOCELE WITH RECTOCELE: Primary | ICD-10-CM

## 2024-12-12 PROCEDURE — 97530 THERAPEUTIC ACTIVITIES: CPT | Performed by: PHYSICAL THERAPIST

## 2024-12-12 PROCEDURE — 97112 NEUROMUSCULAR REEDUCATION: CPT | Performed by: PHYSICAL THERAPIST

## 2024-12-12 NOTE — PROGRESS NOTES
Daily Note     Today's date: 2024  Patient name: Shekhar Nance  : 1970  MRN: [id number]  Referring provider: Rosina Patel PA-C  Dx:   Encounter Diagnosis     ICD-10-CM    1. Cystocele with rectocele  N81.10     N81.6       2. Pelvic pressure in female  R10.2                      Subjective: The patient reports that yesterday she was in the kitchen and felt some sharp pain in her vagina. It lasted for one minute and then subsided. That was the first time in a few months she experienced that. Otherwise, she does continue to feel pelvic pressure and heaviness intermittently.       Objective: See treatment diary below      Assessment: Tolerated treatment well. Patient  does well with exercises today. She has good awareness of her deep core muscles. She would benefit from Biofeedback at a future visit. Did work on slowing down her exercises to allow her pelvic floor muscles to better relax in between contractions. Also worked on posture while sitting on toilet with stool under feet to help with more effective emptying. She was given an updated HEP for today.       Plan: Continue per plan of care.       IE RE   URSULA-18     PFDI-20 11    V-Q     PGQ         Precautions: hypothyroid  [employer] HEP: [id number]     Manuals 12/10 12/12                                                               Neuro Re-Ed             Diaphragmatic Breathing  5 min           TA ADIM  10x           TA + PFMC                          Biofeedback sEMG             PFMC: Slow Holds  10x           PFMC: Quick Flicks  10x           PFMC + hip abd isometrics             PFMC + hip add isometrics             PFMC + ecc bridge             PFMC in sitting  10x SH's           PFMC seated + tilt             PFMC in standing             PFMC in Quadruped                                       Child pose  2 min                        Ther Ex             PPT             Bridge + SLR             Bridge + marching                                                                               Ther Activity             EDUCATION  15 min including toilet posture using stool under feet           PFMC + reach             PFMC + squat             PFMC + lift             PFMC + sit to stand             PFMC + step up/down             Gait Training                                       Modalities

## 2024-12-22 ENCOUNTER — APPOINTMENT (OUTPATIENT)
Dept: LAB | Age: 54
End: 2024-12-22
Payer: COMMERCIAL

## 2024-12-22 DIAGNOSIS — E03.9 HYPOTHYROIDISM, UNSPECIFIED TYPE: ICD-10-CM

## 2024-12-22 LAB — TSH SERPL DL<=0.05 MIU/L-ACNC: 4.15 UIU/ML (ref 0.45–4.5)

## 2024-12-22 PROCEDURE — 36415 COLL VENOUS BLD VENIPUNCTURE: CPT

## 2024-12-22 PROCEDURE — 84443 ASSAY THYROID STIM HORMONE: CPT

## 2024-12-24 ENCOUNTER — OFFICE VISIT (OUTPATIENT)
Dept: FAMILY MEDICINE CLINIC | Facility: CLINIC | Age: 54
End: 2024-12-24

## 2024-12-24 VITALS
WEIGHT: 133 LBS | HEART RATE: 82 BPM | TEMPERATURE: 98.5 F | OXYGEN SATURATION: 96 % | BODY MASS INDEX: 22.71 KG/M2 | SYSTOLIC BLOOD PRESSURE: 97 MMHG | HEIGHT: 64 IN | DIASTOLIC BLOOD PRESSURE: 61 MMHG

## 2024-12-24 DIAGNOSIS — Z23 NEED FOR INFLUENZA VACCINATION: Primary | ICD-10-CM

## 2024-12-24 DIAGNOSIS — E03.9 HYPOTHYROIDISM, UNSPECIFIED TYPE: ICD-10-CM

## 2024-12-24 DIAGNOSIS — R63.4 WEIGHT LOSS: ICD-10-CM

## 2024-12-24 PROCEDURE — 90471 IMMUNIZATION ADMIN: CPT | Performed by: FAMILY MEDICINE

## 2024-12-24 PROCEDURE — 90673 RIV3 VACCINE NO PRESERV IM: CPT | Performed by: FAMILY MEDICINE

## 2024-12-24 PROCEDURE — 99214 OFFICE O/P EST MOD 30 MIN: CPT | Performed by: FAMILY MEDICINE

## 2024-12-24 NOTE — ASSESSMENT & PLAN NOTE
Per chart review, patient weight around 150 pounds in February, was last seen in September and weighed 135 pounds at that time.  Today patient is 133 pounds.  She also reports that weight has been stable since September.  Her recent TSH is within normal limits.  Blood work from September reviewed which was also normal.  She is up-to-date with her cancer screenings. At this time, weight loss could be secondary to new job and change in lifestyle, but  Will continue to monitor weight closely and have her follow-up in 3 months, if continues to lose weight will further investigate.

## 2024-12-24 NOTE — PROGRESS NOTES
Name: Shekhar Nance      : 1970      MRN: [id number]  Encounter Provider: Makayla Kramer MD  Encounter Date: 2024   Encounter department: [facility] [place]  :  Assessment & Plan  Hypothyroidism, unspecified type  Recent TSH 4.146.  Within normal limits.  Will continue levothyroxine 150 mcg daily.  Given weight loss concerns, will recheck in 3 months  Orders:    TSH, 3rd generation; Future    Weight loss  Per chart review, patient weight around 150 pounds in February, was last seen in September and weighed 135 pounds at that time.  Today patient is 133 pounds.  She also reports that weight has been stable since September.  Her recent TSH is within normal limits.  Blood work from September reviewed which was also normal.  She is up-to-date with her cancer screenings. At this time, weight loss could be secondary to new job and change in lifestyle, but  Will continue to monitor weight closely and have her follow-up in 3 months, if continues to lose weight will further investigate.       Need for influenza vaccination    Orders:    influenza vaccine, recombinant, PF, 0.5 mL IM (Flublok)           History of Present Illness     Shekhar presented to office for follow-up of hypothyroidism and weight loss concerns.  She reports that in 2024 she used to weigh around 150 pounds, but now she weighs around 130 pounds.  She did start a new job in March, and has been working from 4 AM to 10 AM every day.  This has affected her sleep and her diet.  But she has not made any other dietary changes.  She did get her TSH checked, and would like to review that today.  Otherwise her eczema is stable with Kenalog.  She has no other concerns today.      Review of Systems   Constitutional:  Negative for chills and fever.   HENT:  Negative for congestion.    Respiratory:  Negative for shortness of breath.    Cardiovascular:  Negative for chest pain.   Gastrointestinal:  Negative for diarrhea,  "nausea and vomiting.   Genitourinary:  Negative for difficulty urinating.   Skin:  Positive for rash.   Neurological:  Negative for headaches.       Objective   BP 97/61 (BP Location: Left arm, Patient Position: Sitting, Cuff Size: Standard)   Pulse 82   Temp 98.5 °F (36.9 °C) (Temporal)   Ht 5' 4\" (1.626 m)   Wt 60.3 kg (133 lb)   LMP 08/15/2020 (Approximate)   SpO2 96%   BMI 22.83 kg/m²      Physical Exam  Constitutional:       Appearance: She is well-developed.   HENT:      Head: Normocephalic and atraumatic.      Right Ear: External ear normal.      Left Ear: External ear normal.   Eyes:      Conjunctiva/sclera: Conjunctivae normal.      Pupils: Pupils are equal, round, and reactive to light.   Cardiovascular:      Rate and Rhythm: Normal rate and regular rhythm.      Heart sounds: Normal heart sounds. No murmur heard.     No friction rub.   Pulmonary:      Effort: Pulmonary effort is normal. No respiratory distress.      Breath sounds: Normal breath sounds. No wheezing or rales.   Musculoskeletal:         General: Normal range of motion.      Cervical back: Normal range of motion and neck supple.   Skin:     General: Skin is warm and dry.   Neurological:      Mental Status: She is alert and oriented to person, place, and time.         "

## 2024-12-24 NOTE — ASSESSMENT & PLAN NOTE
Recent TSH 4.146.  Within normal limits.  Will continue levothyroxine 150 mcg daily.  Given weight loss concerns, will recheck in 3 months  Orders:    TSH, 3rd generation; Future

## 2024-12-26 ENCOUNTER — OFFICE VISIT (OUTPATIENT)
Dept: PHYSICAL THERAPY | Facility: REHABILITATION | Age: 54
End: 2024-12-26
Payer: COMMERCIAL

## 2024-12-26 DIAGNOSIS — N81.10 CYSTOCELE WITH RECTOCELE: Primary | ICD-10-CM

## 2024-12-26 DIAGNOSIS — R10.2 PELVIC PRESSURE IN FEMALE: ICD-10-CM

## 2024-12-26 DIAGNOSIS — N81.6 CYSTOCELE WITH RECTOCELE: Primary | ICD-10-CM

## 2024-12-26 PROCEDURE — 97530 THERAPEUTIC ACTIVITIES: CPT

## 2024-12-26 PROCEDURE — 97112 NEUROMUSCULAR REEDUCATION: CPT

## 2024-12-26 NOTE — PROGRESS NOTES
Daily Note     Today's date: 2024  Patient name: Shekhar Nance  : 1970  MRN: [id number]  Referring provider: Rosina Patel PA-C  Dx:   Encounter Diagnosis     ICD-10-CM    1. Cystocele with rectocele  N81.10     N81.6       2. Pelvic pressure in female  R10.2           Start Time: 1400  Stop Time: 1445  Total time in clinic (min): 45 minutes    Subjective: Patient reports no pressure at start of session, however felt it while she was at work today standing on her feet for a long period of time. No complaints noted after previous session.      Objective: See treatment diary below      Assessment: Tolerated treatment well. Patient demonstrated fatigue post treatment, exhibited good technique with therapeutic exercises, and would benefit from continued PT No increased pressure noted throughout todays session with any TE performed. Patient reports most challenge with endurance of PFMC stating she feels as if she is able to hold for 2-3 seconds before fatigue. Good tolerance to additional TE performed today.       Plan: Continue per plan of care.  Progress treatment as tolerated.          IE RE   URSULA-18     PFDI-20 11    V-Q     PGQ         Precautions: hypothyroid  [employer] HEP: [id number]     Manuals 12/10 12/12 12/26                                                              Neuro Re-Ed             Diaphragmatic Breathing  5 min Done           TA ADIM  10x 10x          TA + PFMC                          Biofeedback sEMG             PFMC: Slow Holds  10x 10x          PFMC: Quick Flicks  10x 10x          PFMC + hip abd isometrics             PFMC + hip add isometrics   10x          PFMC + ecc bridge   10x          PFMC in sitting  10x SH's 10x SH  10x QF          PFMC seated + tilt             PFMC in standing             PFMC in Quadruped                                       Child pose  2 min                        Ther Ex             PPT             Bridge + SLR             Bridge + marching                                                                               Ther Activity             EDUCATION  15 min including toilet posture using stool under feet           PFMC + reach             PFMC + squat             PFMC + lift             PFMC + sit to stand             PFMC + step up/down             Gait Training                                       Modalities

## 2024-12-31 ENCOUNTER — OFFICE VISIT (OUTPATIENT)
Dept: PHYSICAL THERAPY | Facility: REHABILITATION | Age: 54
End: 2024-12-31
Payer: COMMERCIAL

## 2024-12-31 ENCOUNTER — APPOINTMENT (OUTPATIENT)
Dept: PHYSICAL THERAPY | Facility: REHABILITATION | Age: 54
End: 2024-12-31
Payer: COMMERCIAL

## 2024-12-31 DIAGNOSIS — R10.2 PELVIC PRESSURE IN FEMALE: ICD-10-CM

## 2024-12-31 DIAGNOSIS — N81.6 CYSTOCELE WITH RECTOCELE: Primary | ICD-10-CM

## 2024-12-31 DIAGNOSIS — N81.10 CYSTOCELE WITH RECTOCELE: Primary | ICD-10-CM

## 2024-12-31 PROCEDURE — 97530 THERAPEUTIC ACTIVITIES: CPT | Performed by: PHYSICAL THERAPIST

## 2024-12-31 PROCEDURE — 97112 NEUROMUSCULAR REEDUCATION: CPT | Performed by: PHYSICAL THERAPIST

## 2024-12-31 NOTE — PROGRESS NOTES
"Daily Note     Today's date: 2024  Patient name: Shekhar Nance  : 1970  MRN: [id number]  Referring provider: Rosina Patel PA-C  Dx:   Encounter Diagnosis     ICD-10-CM    1. Cystocele with rectocele  N81.10     N81.6       2. Pelvic pressure in female  R10.2           Start Time: 1215  Stop Time: 1300  Total time in clinic (min): 45 minutes    Subjective: The patient notes that she feels better in regards to the pelvic pressure sensation with doing exercises. She has had a few good days in regards to constipation but also is straining less.       Objective: See treatment diary below      Assessment: Tolerated treatment well. Patient  performed Biofeedback today. She did have slightly elevated resting muscle tone at 5.0 mV. Will repeat this again NV. She had good muscle recrutiment but did demonstrate fatigue over course of 5-6 contractions with QF\"s and SH's.  She did well with exercises today and also reviewed toilet posture and proper defecation mechanics to avoid straining and putting added stress on pelvic floor and pelvic organs.      Plan: Continue per plan of care.         IE RE   URSULA-18     PFDI-20 11    V-Q     PGQ         Precautions: hypothyroid  [employer] HEP: [id number]     Manuals 12/10 12/12 12/26 12/31                                                             Neuro Re-Ed             Diaphragmatic Breathing  5 min Done           TA ADIM  10x 10x 10x         TA + PFMC                          Biofeedback sEMG             PFMC: Slow Holds  10x 10x 10x         PFMC: Quick Flicks  10x 10x 10x         PFMC + hip abd isometrics             PFMC + hip add isometrics   10x 10x         PFMC + ecc bridge   10x 10x         PFMC in sitting  10x SH's 10x SH  10x QF          PFMC seated + tilt             PFMC in standing             PFMC in Quadruped                                       Child pose  2 min                        Ther Ex             PPT             Bridge + SLR             Bridge + " marching                                                                              Ther Activity             EDUCATION  15 min including toilet posture using stool under feet  10 min including toilet posture using stool under feet         PFMC + reach             PFMC + squat             PFMC + lift             PFMC + sit to stand             PFMC + step up/down             Gait Training                                       Modalities

## 2025-01-06 ENCOUNTER — APPOINTMENT (OUTPATIENT)
Dept: PHYSICAL THERAPY | Facility: REHABILITATION | Age: 55
End: 2025-01-06
Payer: COMMERCIAL

## 2025-01-07 ENCOUNTER — OFFICE VISIT (OUTPATIENT)
Dept: PHYSICAL THERAPY | Facility: REHABILITATION | Age: 55
End: 2025-01-07
Payer: COMMERCIAL

## 2025-01-07 DIAGNOSIS — N81.10 CYSTOCELE WITH RECTOCELE: Primary | ICD-10-CM

## 2025-01-07 DIAGNOSIS — R10.2 PELVIC PRESSURE IN FEMALE: ICD-10-CM

## 2025-01-07 DIAGNOSIS — N81.6 CYSTOCELE WITH RECTOCELE: Primary | ICD-10-CM

## 2025-01-07 PROCEDURE — 97112 NEUROMUSCULAR REEDUCATION: CPT | Performed by: PHYSICAL THERAPIST

## 2025-01-07 NOTE — PROGRESS NOTES
Daily Note     Today's date: 2025  Patient name: Shekhar Nance  : 1970  MRN: [id number]  Referring provider: Rosina Patel PA-C  Dx:   Encounter Diagnosis     ICD-10-CM    1. Cystocele with rectocele  N81.10     N81.6       2. Pelvic pressure in female  R10.2           Start Time: 1000  Stop Time: 1050  Total time in clinic (min): 50 minutes    Subjective: The patient notes that on  she was sitting for long periods of time on carpeted floor. She was at a  and was not drinking water much and was not eating as she usually was constipated yesterday. She also had some pressure on her feet at work yesterday. She is feeling better today. She was able to have a bowel movement today and did not have to strain.       Objective: See treatment diary below      Assessment: Tolerated treatment well. Patient  did well with her exercises today. Trialed one to two pillows under hips to elevate her hips to help heaviness sensation related to prolapse while strengthening. She noted pelvic floor muscle fatigue by the end of treatment after practicing sit to stand transitions for additional support. She will return in one week for her next visit.       Plan: Continue per plan of care.         IE RE   URSULA-18     PFDI-20 11    V-Q     PGQ         Precautions: hypothyroid  [employer] HEP: [id number]     Manuals 12/10 12/12 12/26 12/31 1/7                                                            Neuro Re-Ed             Diaphragmatic Breathing  5 min Done           TA ADIM  10x 10x 10x 10x        TA + PFMC                          Biofeedback sEMG             PFMC: Slow Holds  10x 10x 10x 10x with pillow under hips        PFMC: Quick Flicks  10x 10x 10x         PFMC + hip abd isometrics     10x        PFMC + hip add isometrics   10x 10x 10x         PFMC + ecc bridge   10x 10x 10x        PFMC in sitting  10x SH's 10x SH  10x QF          PFMC seated + tilt             PFMC in standing             PFMC in Quadruped                                        Child pose  2 min                        Ther Ex             PPT             Bridge + SLR             Bridge + marching                                                                              Ther Activity             EDUCATION  15 min including toilet posture using stool under feet  10 min including toilet posture using stool under feet         PFMC + reach             PFMC + squat             PFMC + lift             PFMC + sit to stand     8x        PFMC + step up/down             Gait Training                                       Modalities

## 2025-01-13 ENCOUNTER — OFFICE VISIT (OUTPATIENT)
Dept: PHYSICAL THERAPY | Facility: REHABILITATION | Age: 55
End: 2025-01-13
Payer: COMMERCIAL

## 2025-01-13 DIAGNOSIS — N81.10 CYSTOCELE WITH RECTOCELE: Primary | ICD-10-CM

## 2025-01-13 DIAGNOSIS — N81.6 CYSTOCELE WITH RECTOCELE: Primary | ICD-10-CM

## 2025-01-13 DIAGNOSIS — R10.2 PELVIC PRESSURE IN FEMALE: ICD-10-CM

## 2025-01-13 PROCEDURE — 97112 NEUROMUSCULAR REEDUCATION: CPT | Performed by: PHYSICAL THERAPIST

## 2025-01-13 PROCEDURE — 97530 THERAPEUTIC ACTIVITIES: CPT | Performed by: PHYSICAL THERAPIST

## 2025-01-13 NOTE — PROGRESS NOTES
Daily Note     Today's date: 2025  Patient name: Shekhar Nance  : 1970  MRN: [id number]  Referring provider: Rosina Patel PA-C  Dx:   Encounter Diagnosis     ICD-10-CM    1. Cystocele with rectocele  N81.10     N81.6       2. Pelvic pressure in female  R10.2           Start Time: 1700  Stop Time: 1740  Total time in clinic (min): 40 minutes    Subjective: The patient reports that she has been doing her exercises in the morning before going into work. She has been feeling better in regards to pressure with standing on her feet.       Objective: See treatment diary below      Assessment: Tolerated treatment well. Patient  does note faster fatigue with pelvic floor muscle exercise in standing. Recommended that she incorporate strengthening in standing at home, 5-10 contractions, 3-4 times a day when she can to help improve strength and muscle endurance, which will ultimately help with pelvic organ support as well as she feels pressure related to prolapse when she is on her feet for periods of time during the day.      Plan: Continue per plan of care.         IE RE   URSULA-18     PFDI-20 11    V-Q     PGQ         Precautions: hypothyroid  [employer] HEP: [id number]     Manuals 12/10 12/12 12/26 12/31 1/7 1/13                                                           Neuro Re-Ed             Diaphragmatic Breathing  5 min Done           TA ADIM  10x 10x 10x 10x        TA + PFMC                          Biofeedback sEMG             PFMC: Slow Holds  10x 10x 10x 10x with pillow under hips 10x with pillow under hips       PFMC: Quick Flicks  10x 10x 10x         PFMC + hip abd isometrics     10x 10x  [place] tband       PFMC + hip add isometrics   10x 10x 10x  10x       PFMC + ecc bridge   10x 10x 10x 10x       PFMC in sitting  10x SH's 10x SH  10x QF          PFMC seated + tilt             PFMC in standing      10x        PFMC in Quadruped                                       Child pose  2 min                         Ther Ex             PPT             Bridge + SLR             Bridge + marching                                                                              Ther Activity             EDUCATION  15 min including toilet posture using stool under feet  10 min including toilet posture using stool under feet  10 min       PFMC + reach             PFMC + squat             PFMC + lift             PFMC + sit to stand     8x        PFMC + step up/down             Gait Training                                       Modalities

## 2025-01-20 ENCOUNTER — OFFICE VISIT (OUTPATIENT)
Dept: PHYSICAL THERAPY | Facility: REHABILITATION | Age: 55
End: 2025-01-20
Payer: COMMERCIAL

## 2025-01-20 DIAGNOSIS — N81.10 CYSTOCELE WITH RECTOCELE: Primary | ICD-10-CM

## 2025-01-20 DIAGNOSIS — R10.2 PELVIC PRESSURE IN FEMALE: ICD-10-CM

## 2025-01-20 DIAGNOSIS — N81.6 CYSTOCELE WITH RECTOCELE: Primary | ICD-10-CM

## 2025-01-20 PROCEDURE — 97112 NEUROMUSCULAR REEDUCATION: CPT | Performed by: PHYSICAL THERAPIST

## 2025-01-20 PROCEDURE — 97530 THERAPEUTIC ACTIVITIES: CPT | Performed by: PHYSICAL THERAPIST

## 2025-01-20 NOTE — PROGRESS NOTES
Daily Note     Today's date: 2025  Patient name: Shekhar Nance  : 1970  MRN: [id number]  Referring provider: Rosina Patel PA-C  Dx:   Encounter Diagnosis     ICD-10-CM    1. Cystocele with rectocele  N81.10     N81.6       2. Pelvic pressure in female  R10.2           Start Time: 1200  Stop Time: 1250  Total time in clinic (min): 50 minutes    Subjective: The patient notes that she shoveled snow yesterday and this morning and had no issues with heaviness or pressure afterwards. She has been compliant with her HEP and has been feeling afterwards. She is traveling to [facility] in February for at least a month.       Objective: See treatment diary below      Assessment: Tolerated treatment well. Patient  does well with exercises today. She does not seem to fatigue as quickly. Worked on functional pelvic floor muscle engagement with bending and reaching and discussed utilizing this when she is bending or lifting to give support against strain on the core and pelvic organs. She found this to be helpful as she is on her feet and active at work for her entire shift. She will return in one week for her next visit and then she will have a short break in care while she travels. Update HEP next visit.       Plan: Continue per plan of care.         IE RE   URSULA-18     PFDI-20 11    V-Q     PGQ         Precautions: hypothyroid  [employer] HEP: [id number]     Manuals 12/10 12/12 12/26 12/31 1/7 1/13 1/20                                                          Neuro Re-Ed             Diaphragmatic Breathing  5 min Done           TA ADIM  10x 10x 10x 10x  10x      TA + PFMC                          Biofeedback sEMG             PFMC: Slow Holds  10x 10x 10x 10x with pillow under hips 10x with pillow under hips 10x with pillowunder hips      PFMC: Quick Flicks  10x 10x 10x         PFMC + hip abd isometrics     10x 10x  Pink tband 10x pink tband      PFMC + hip add isometrics   10x 10x 10x  10x 10x      PFMC + ecc bridge   10x  10x 10x 10x 10x      PFMC in sitting  10x SH's 10x SH  10x QF          PFMC seated + tilt             PFMC in standing      10x  10x      PFMC in Quadruped                                       Child pose  2 min                        Ther Ex             PPT             Bridge + SLR             Bridge + marching                                                                              Ther Activity             EDUCATION  15 min including toilet posture using stool under feet  10 min including toilet posture using stool under feet  10 min 10 min      PFMC + reach       [facility] off floor  8x up/down      PFMC + squat             PFMC + lift             PFMC + sit to stand     8x        PFMC + step up/down             Gait Training                                       Modalities

## 2025-01-27 ENCOUNTER — OFFICE VISIT (OUTPATIENT)
Dept: PHYSICAL THERAPY | Facility: REHABILITATION | Age: 55
End: 2025-01-27
Payer: COMMERCIAL

## 2025-01-27 DIAGNOSIS — N81.6 CYSTOCELE WITH RECTOCELE: Primary | ICD-10-CM

## 2025-01-27 DIAGNOSIS — N81.10 CYSTOCELE WITH RECTOCELE: Primary | ICD-10-CM

## 2025-01-27 DIAGNOSIS — R10.2 PELVIC PRESSURE IN FEMALE: ICD-10-CM

## 2025-01-27 PROCEDURE — 97530 THERAPEUTIC ACTIVITIES: CPT | Performed by: PHYSICAL THERAPIST

## 2025-01-27 PROCEDURE — 97112 NEUROMUSCULAR REEDUCATION: CPT | Performed by: PHYSICAL THERAPIST

## 2025-01-27 NOTE — PROGRESS NOTES
Daily Note and DIscharge    Today's date: 2025  Patient name: Shekhar Nance  : 1970  MRN: [id number]  Referring provider: Rosina Patel PA-C  Dx:   Encounter Diagnosis     ICD-10-CM    1. Cystocele with rectocele  N81.10     N81.6       2. Pelvic pressure in female  R10.2           Start Time: 1705  Stop Time: 1755  Total time in clinic (min): 50 minutes    Subjective: The patient notes that she feels better overall. She is busy at times, and she does not feel heaviness and pressure as often. She does also note less frequent pain in her vagina as well. She is happy with her progress. She has been able to be compliant with her exercises.       Objective: See treatment diary below      Assessment: Tolerated treatment well. Patient  does well with e xercises today. Reviewed home program as she will be traveling to [facility] for a few weeks to visit family. Updated HEP for home. Did also discuss and talk about using good body mechanics and engaging her pelvic floor muscles as a protective strategy against activities such as lifting. Talked about maneuvering her luggage as she will be traveling alone. She was encouraged to reach out with any questions/concerns. She was treated for a total of 8 visits including her IE on 12/10/24. She will reach out upon return from her trip if further treatment is needed, otherwise she will be discharged from outpatient PT at this time.       Plan: Continue per plan of care.         IE RE   URSULA-18     PFDI-20 11 (43.75) 10 (37.5)   V-Q     PGQ         Precautions: hypothyroid  [employer] HEP: [id number]     Manuals 12/10 12/12 12/26 12/31 1/7 1/13 1/20 1/27                                                         Neuro Re-Ed             Diaphragmatic Breathing  5 min Done           TA ADIM  10x 10x 10x 10x  10x 10x     TA + PFMC                          Biofeedback sEMG             PFMC: Slow Holds  10x 10x 10x 10x with pillow under hips 10x with pillow under hips 10x with  pillowunder hips 10x with pillow under her hips     PFMC: Quick Flicks  10x 10x 10x         PFMC + hip abd isometrics     10x 10x  Pink tband 10x pink tband      PFMC + hip add isometrics   10x 10x 10x  10x 10x      PFMC + ecc bridge   10x 10x 10x 10x 10x 10x      PFMC in sitting  10x SH's 10x SH  10x QF          PFMC seated + tilt             PFMC in standing      10x  10x      PFMC in Quadruped                                       Child pose  2 min                        Ther Ex             PPT             Bridge + SLR             Bridge + marching                                                                              Ther Activity             EDUCATION  15 min including toilet posture using stool under feet  10 min including toilet posture using stool under feet  10 min 10 min      PFMC + reach       [facility] off floor  8x up/down      PFMC + squat             PFMC + lift             PFMC + sit to stand     8x        PFMC + step up/down             Gait Training                                       Modalities

## 2025-01-29 DIAGNOSIS — L20.9 ATOPIC DERMATITIS, UNSPECIFIED TYPE: ICD-10-CM

## 2025-01-29 DIAGNOSIS — E03.9 HYPOTHYROIDISM, UNSPECIFIED TYPE: ICD-10-CM

## 2025-01-29 DIAGNOSIS — L25.9 CONTACT DERMATITIS AND ECZEMA: ICD-10-CM

## 2025-01-29 RX ORDER — TRIAMCINOLONE ACETONIDE 1 MG/G
OINTMENT TOPICAL 2 TIMES DAILY
Qty: 80 G | Refills: 0 | Status: SHIPPED | OUTPATIENT
Start: 2025-01-29

## 2025-01-29 RX ORDER — CLOBETASOL PROPIONATE 0.5 MG/G
CREAM TOPICAL 2 TIMES DAILY
Qty: 30 G | Refills: 0 | Status: SHIPPED | OUTPATIENT
Start: 2025-01-29

## 2025-01-29 RX ORDER — LEVOTHYROXINE SODIUM 150 UG/1
150 TABLET ORAL DAILY
Qty: 90 TABLET | Refills: 0 | Status: SHIPPED | OUTPATIENT
Start: 2025-01-29

## 2025-03-25 ENCOUNTER — OFFICE VISIT (OUTPATIENT)
Dept: FAMILY MEDICINE CLINIC | Facility: CLINIC | Age: 55
End: 2025-03-25

## 2025-03-25 VITALS
DIASTOLIC BLOOD PRESSURE: 66 MMHG | TEMPERATURE: 97.8 F | OXYGEN SATURATION: 99 % | SYSTOLIC BLOOD PRESSURE: 112 MMHG | RESPIRATION RATE: 18 BRPM | HEART RATE: 69 BPM | WEIGHT: 132 LBS | BODY MASS INDEX: 22.66 KG/M2

## 2025-03-25 DIAGNOSIS — M54.50 CHRONIC MIDLINE LOW BACK PAIN WITHOUT SCIATICA: ICD-10-CM

## 2025-03-25 DIAGNOSIS — G89.29 CHRONIC MIDLINE LOW BACK PAIN WITHOUT SCIATICA: ICD-10-CM

## 2025-03-25 DIAGNOSIS — R63.4 WEIGHT LOSS: Primary | ICD-10-CM

## 2025-03-25 DIAGNOSIS — E03.9 HYPOTHYROIDISM, UNSPECIFIED TYPE: ICD-10-CM

## 2025-03-25 PROCEDURE — 99214 OFFICE O/P EST MOD 30 MIN: CPT | Performed by: FAMILY MEDICINE

## 2025-03-25 NOTE — PROGRESS NOTES
Name: Shekhar Nance      : 1970      MRN: [id number]  Encounter Provider: Makayla Krmaer MD  Encounter Date: 3/25/2025   Encounter department: [facility] [place]  :  Assessment & Plan  Weight loss  Weight has been stable since the last visit, but on chart review patient did go down from 150 pounds to 135 pounds.  Blood work was unremarkable, TSH within normal limits.  Will check CT chest abdomen pelvis without contrast at this time to rule out any malignancies.  Patient to follow-up in 2 months for weight recheck.  Orders:    CT chest abdomen pelvis wo contrast; Future    Chronic midline low back pain without sciatica  Likely secondary to travel, may take Tylenol as needed.       Hypothyroidism, unspecified type  Currently on levothyroxine 150 mcg, will recheck TSH.  TSH in September was 1.503, in December was 4.146.              History of Present Illness   Shekhar presented to office for follow-up of weight loss.  She reports that her weight has been stable for last 3 months, but she lost 20 pounds in the last year.  Her thyroid has been stable, she denies any other symptoms including vaginal bleeding.  She does complain of lower back pain, but just returned from [place] and was in a long flight.  She is very concerned about her weight loss.  She has no other concerns today.      Review of Systems   Constitutional:  Positive for unexpected weight change. Negative for chills and fever.   HENT:  Negative for congestion.    Respiratory:  Negative for shortness of breath.    Cardiovascular:  Negative for chest pain.   Gastrointestinal:  Negative for diarrhea, nausea and vomiting.   Genitourinary:  Negative for difficulty urinating.   Neurological:  Negative for headaches.       Objective   /66 (BP Location: Left arm, Patient Position: Sitting, Cuff Size: Standard)   Pulse 69   Temp 97.8 °F (36.6 °C) (Temporal)   Resp 18   Wt 59.9 kg (132 lb)   LMP 08/15/2020 (Approximate)    SpO2 99%   BMI 22.66 kg/m²      Physical Exam  Constitutional:       Appearance: She is well-developed.   HENT:      Head: Normocephalic and atraumatic.      Right Ear: External ear normal.      Left Ear: External ear normal.   Eyes:      Conjunctiva/sclera: Conjunctivae normal.      Pupils: Pupils are equal, round, and reactive to light.   Cardiovascular:      Rate and Rhythm: Normal rate and regular rhythm.      Heart sounds: Normal heart sounds. No murmur heard.     No friction rub.   Pulmonary:      Effort: Pulmonary effort is normal. No respiratory distress.      Breath sounds: Normal breath sounds. No wheezing or rales.   Abdominal:      General: Bowel sounds are normal. There is no distension.      Palpations: Abdomen is soft.      Tenderness: There is no abdominal tenderness.   Musculoskeletal:         General: Normal range of motion.      Cervical back: Normal range of motion and neck supple.   Skin:     General: Skin is warm and dry.   Neurological:      Mental Status: She is alert and oriented to person, place, and time.

## 2025-03-25 NOTE — ASSESSMENT & PLAN NOTE
Weight has been stable since the last visit, but on chart review patient did go down from 150 pounds to 135 pounds.  Blood work was unremarkable, TSH within normal limits.  Will check CT chest abdomen pelvis without contrast at this time to rule out any malignancies.  Patient to follow-up in 2 months for weight recheck.  Orders:    CT chest abdomen pelvis wo contrast; Future

## 2025-03-25 NOTE — ASSESSMENT & PLAN NOTE
Currently on levothyroxine 150 mcg, will recheck TSH.  TSH in September was 1.503, in December was 4.146.

## 2025-05-28 ENCOUNTER — APPOINTMENT (OUTPATIENT)
Dept: LAB | Facility: CLINIC | Age: 55
End: 2025-05-28
Attending: FAMILY MEDICINE
Payer: COMMERCIAL

## 2025-05-28 DIAGNOSIS — L20.9 ATOPIC DERMATITIS, UNSPECIFIED TYPE: ICD-10-CM

## 2025-05-28 DIAGNOSIS — E03.9 HYPOTHYROIDISM, UNSPECIFIED TYPE: ICD-10-CM

## 2025-05-28 DIAGNOSIS — L25.9 CONTACT DERMATITIS AND ECZEMA: ICD-10-CM

## 2025-05-28 LAB — TSH SERPL DL<=0.05 MIU/L-ACNC: 1.06 UIU/ML (ref 0.45–4.5)

## 2025-05-28 PROCEDURE — 84443 ASSAY THYROID STIM HORMONE: CPT

## 2025-05-28 PROCEDURE — 36415 COLL VENOUS BLD VENIPUNCTURE: CPT

## 2025-05-28 RX ORDER — CLOBETASOL PROPIONATE 0.5 MG/G
CREAM TOPICAL 2 TIMES DAILY
Qty: 30 G | Refills: 0 | Status: SHIPPED | OUTPATIENT
Start: 2025-05-28

## 2025-05-28 RX ORDER — LEVOTHYROXINE SODIUM 150 UG/1
150 TABLET ORAL DAILY
Qty: 90 TABLET | Refills: 0 | Status: SHIPPED | OUTPATIENT
Start: 2025-05-28

## 2025-05-28 RX ORDER — TRIAMCINOLONE ACETONIDE 1 MG/G
OINTMENT TOPICAL 2 TIMES DAILY
Qty: 80 G | Refills: 0 | Status: SHIPPED | OUTPATIENT
Start: 2025-05-28

## 2025-05-29 ENCOUNTER — OFFICE VISIT (OUTPATIENT)
Dept: FAMILY MEDICINE CLINIC | Facility: CLINIC | Age: 55
End: 2025-05-29

## 2025-05-29 VITALS
OXYGEN SATURATION: 99 % | HEIGHT: 64 IN | BODY MASS INDEX: 22.23 KG/M2 | RESPIRATION RATE: 18 BRPM | WEIGHT: 130.2 LBS | TEMPERATURE: 98.1 F | HEART RATE: 58 BPM | DIASTOLIC BLOOD PRESSURE: 70 MMHG | SYSTOLIC BLOOD PRESSURE: 111 MMHG

## 2025-05-29 DIAGNOSIS — B35.1 ONYCHOMYCOSIS: ICD-10-CM

## 2025-05-29 DIAGNOSIS — R63.4 WEIGHT LOSS: ICD-10-CM

## 2025-05-29 DIAGNOSIS — E03.9 HYPOTHYROIDISM, UNSPECIFIED TYPE: Primary | ICD-10-CM

## 2025-05-29 PROCEDURE — 99214 OFFICE O/P EST MOD 30 MIN: CPT | Performed by: FAMILY MEDICINE

## 2025-05-29 RX ORDER — CICLOPIROX 80 MG/ML
SOLUTION TOPICAL
Qty: 6 ML | Refills: 1 | Status: SHIPPED | OUTPATIENT
Start: 2025-05-29

## 2025-05-29 NOTE — ASSESSMENT & PLAN NOTE
Most recent TSH 1.056 on May 28, will continue current dose of levothyroxine 150 mcg daily.  Will recheck TSH of 6 months now.

## 2025-05-29 NOTE — PROGRESS NOTES
Name: Shekhar Nance      : 1970      MRN: [id number]  Encounter Provider: Makayla Kramer MD  Encounter Date: 2025   Encounter department: [facility] [place]  :  Assessment & Plan  Hypothyroidism, unspecified type  Most recent TSH 1.056 on May 28, will continue current dose of levothyroxine 150 mcg daily.  Will recheck TSH of 6 months now.       Weight loss  Patient did lose 20 pounds initially, but since then her weight has been stable.  She lost around 1-1/2 pound since her last visit in March.  CT chest abdomen pelvis was denied by insurance, but rest of her blood work was benign.  She did have coinciding stressors including hectic job, but now she has changed jobs.  Will monitor weight for another 3 months.  If weight is stable will not do any further workup, but if she continues to lose weight we will request for CT chest abdomen pelvis again to rule out malignancies.  Patient is up-to-date with her screenings.       Onychomycosis  Discoloration of left toenail, likely secondary to onychomycosis.  Will try ciclopirox topically for 8 weeks.              History of Present Illness   Shehkar presented to office for follow-up of hypothyroidism and weight loss.  Patient reports that she has been taking her medications regularly, has no complaints today other than discolored toenail of her left foot.  She was having weight loss last few months, lost up to 20 pounds in the beginning.  All the workup came up benign.  She was requested to get CT chest abdomen pelvis done, but it was denied by insurance.  Patient also had really tough work schedule at that time, was not eating as much.  Over the last few months her weight has been stable with ±2 pounds.  Patient weighed around 132 pounds in her last visit in March, today she weighs 130 pounds 3 ounces.  She continues to deny any trouble breathing, chest pain, abdominal pain, diarrhea, constipation, vaginal bleeding.  She reports no  "loss of appetite, she also recently changed jobs, and the new job is not as demanding.  She does have some stressors, but feels like she is doing okay.  She enjoys doing this, and denies any overt depression.      Review of Systems   Constitutional:  Negative for chills and fever.   HENT:  Negative for congestion.    Respiratory:  Negative for shortness of breath.    Cardiovascular:  Negative for chest pain.   Gastrointestinal:  Negative for diarrhea, nausea and vomiting.   Genitourinary:  Negative for difficulty urinating.   Skin:  Positive for rash.   Neurological:  Negative for headaches.       Objective   /70 (BP Location: Left arm, Patient Position: Sitting, Cuff Size: Standard)   Pulse 58   Temp 98.1 °F (36.7 °C) (Temporal)   Resp 18   Ht 5' 4\" (1.626 m)   Wt 59.1 kg (130 lb 3.2 oz)   LMP 08/15/2020 (Approximate)   SpO2 99%   BMI 22.35 kg/m²      Physical Exam  Constitutional:       Appearance: She is well-developed.   HENT:      Head: Normocephalic and atraumatic.      Right Ear: External ear normal.      Left Ear: External ear normal.     Eyes:      Conjunctiva/sclera: Conjunctivae normal.      Pupils: Pupils are equal, round, and reactive to light.       Cardiovascular:      Rate and Rhythm: Normal rate and regular rhythm.      Heart sounds: Normal heart sounds. No murmur heard.     No friction rub.   Pulmonary:      Effort: Pulmonary effort is normal. No respiratory distress.      Breath sounds: Normal breath sounds. No wheezing or rales.   Abdominal:      General: Bowel sounds are normal. There is no distension.      Palpations: Abdomen is soft.      Tenderness: There is no abdominal tenderness.     Musculoskeletal:         General: Normal range of motion.      Cervical back: Normal range of motion and neck supple.     Skin:     General: Skin is warm and dry.      Comments: Discolored and thickened left toenail     Neurological:      Mental Status: She is alert and oriented to person, place, " and time.

## 2025-05-29 NOTE — ASSESSMENT & PLAN NOTE
Patient did lose 20 pounds initially, but since then her weight has been stable.  She lost around 1-1/2 pound since her last visit in March.  CT chest abdomen pelvis was denied by insurance, but rest of her blood work was benign.  She did have coinciding stressors including hectic job, but now she has changed jobs.  Will monitor weight for another 3 months.  If weight is stable will not do any further workup, but if she continues to lose weight we will request for CT chest abdomen pelvis again to rule out malignancies.  Patient is up-to-date with her screenings.

## 2025-06-25 ENCOUNTER — HOSPITAL ENCOUNTER (EMERGENCY)
Facility: HOSPITAL | Age: 55
Discharge: HOME | End: 2025-06-25
Attending: EMERGENCY MEDICINE | Admitting: EMERGENCY MEDICINE
Payer: COMMERCIAL

## 2025-06-25 VITALS
RESPIRATION RATE: 17 BRPM | HEART RATE: 99 BPM | DIASTOLIC BLOOD PRESSURE: 52 MMHG | TEMPERATURE: 97.8 F | WEIGHT: 130 LBS | SYSTOLIC BLOOD PRESSURE: 100 MMHG | HEIGHT: 64 IN | OXYGEN SATURATION: 99 % | BODY MASS INDEX: 22.2 KG/M2

## 2025-06-25 DIAGNOSIS — T78.40XA ALLERGIC REACTION, INITIAL ENCOUNTER: Primary | ICD-10-CM

## 2025-06-25 LAB
ATRIAL RATE: 73 BPM
P AXIS: 65 DEGREES
PR INTERVAL: 126 MS
QRS AXIS: 77 DEGREES
QRSD INTERVAL: 72 MS
QT INTERVAL: 414 MS
QTC INTERVAL: 456 MS
T WAVE AXIS: 61 DEGREES
VENTRICULAR RATE: 73 BPM

## 2025-06-25 PROCEDURE — 99284 EMERGENCY DEPT VISIT MOD MDM: CPT

## 2025-06-25 PROCEDURE — 96375 TX/PRO/DX INJ NEW DRUG ADDON: CPT

## 2025-06-25 PROCEDURE — 93005 ELECTROCARDIOGRAM TRACING: CPT

## 2025-06-25 PROCEDURE — 96374 THER/PROPH/DIAG INJ IV PUSH: CPT

## 2025-06-25 PROCEDURE — 93010 ELECTROCARDIOGRAM REPORT: CPT | Performed by: STUDENT IN AN ORGANIZED HEALTH CARE EDUCATION/TRAINING PROGRAM

## 2025-06-25 PROCEDURE — 99285 EMERGENCY DEPT VISIT HI MDM: CPT | Performed by: EMERGENCY MEDICINE

## 2025-06-25 RX ORDER — EPINEPHRINE 0.3 MG/.3ML
0.3 INJECTION SUBCUTANEOUS ONCE
Qty: 2 EACH | Refills: 0 | Status: SHIPPED | OUTPATIENT
Start: 2025-06-25 | End: 2025-07-02

## 2025-06-25 RX ORDER — DIPHENHYDRAMINE HYDROCHLORIDE 50 MG/ML
25 INJECTION, SOLUTION INTRAMUSCULAR; INTRAVENOUS ONCE
Status: COMPLETED | OUTPATIENT
Start: 2025-06-25 | End: 2025-06-25

## 2025-06-25 RX ORDER — METHYLPREDNISOLONE SODIUM SUCCINATE 125 MG/2ML
125 INJECTION, POWDER, LYOPHILIZED, FOR SOLUTION INTRAMUSCULAR; INTRAVENOUS ONCE
Status: COMPLETED | OUTPATIENT
Start: 2025-06-25 | End: 2025-06-25

## 2025-06-25 RX ORDER — EPINEPHRINE 1 MG/ML
INJECTION, SOLUTION, CONCENTRATE INTRAVENOUS
Status: COMPLETED
Start: 2025-06-25 | End: 2025-06-25

## 2025-06-25 RX ADMIN — METHYLPREDNISOLONE SODIUM SUCCINATE 125 MG: 125 INJECTION, POWDER, FOR SOLUTION INTRAMUSCULAR; INTRAVENOUS at 12:00

## 2025-06-25 RX ADMIN — Medication: at 11:56

## 2025-06-25 RX ADMIN — DIPHENHYDRAMINE HYDROCHLORIDE 25 MG: 50 INJECTION, SOLUTION INTRAMUSCULAR; INTRAVENOUS at 11:59

## 2025-06-25 NOTE — ED ATTENDING ATTESTATION
6/25/2025  I, Chelle Meng MD, saw and evaluated the patient. I have discussed the patient with the resident/non-physician practitioner and agree with the resident's/non-physician practitioner's findings, Plan of Care, and MDM as documented in the resident's/non-physician practitioner's note, except where noted. All available labs and Radiology studies were reviewed.  I was present for key portions of any procedure(s) performed by the resident/non-physician practitioner and I was immediately available to provide assistance.       At this point I agree with the current assessment done in the Emergency Department.  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  Denies difficulty breathing or wheezing.  No areas of rash.  Denies chest pain.  No history of need for allergic reaction requiring epinephrine in the past.    Physical Exam  Vitals and nursing note reviewed.   Constitutional:       General: She is not in acute distress.     Appearance: She is well-developed. She is not toxic-appearing or diaphoretic.   HENT:      Head: Normocephalic and atraumatic.      Right Ear: External ear normal.      Left Ear: External ear normal.      Nose: Nose normal.      Mouth/Throat:      Comments: No swelling to the lips, tongue, or visualized portions of the oropharynx    Eyes:      Conjunctiva/sclera: Conjunctivae normal.       Cardiovascular:      Rate and Rhythm: Normal rate and regular rhythm.      Pulses: Normal pulses.      Heart sounds: Normal heart sounds. No murmur heard.     No friction rub. No gallop.   Pulmonary:      Effort: Pulmonary  effort is normal. No respiratory distress.      Breath sounds: Normal breath sounds. No wheezing or rales.   Abdominal:      General: Bowel sounds are normal. There is no distension.      Palpations: Abdomen is soft.      Tenderness: There is no abdominal tenderness (abdomen benign to deep palpation). There is no guarding.     Musculoskeletal:         General: No deformity. Normal range of motion.      Cervical back: Normal range of motion and neck supple.      Right lower leg: No edema.      Left lower leg: No edema.     Skin:     General: Skin is warm and dry.     Neurological:      Mental Status: She is alert and oriented to person, place, and time.      Motor: No abnormal muscle tone.     Psychiatric:         Mood and Affect: Mood normal.           ED Course  ED Course as of 06/25/25 1218   Wed Jun 25, 2025   1211 Patient is reporting stomach discomfort but abdomen is benign on exam.  No chest discomfort.  I personally interpreted her EKG which reveals normal rate, normal sinus rhythm, normal axis, normal intervals, no ischemic changes.  Description of symptoms starting shortly after eating unknown allergen most consistent with anaphylaxis.  Patient given dose of IM epinephrine, steroid, antihistamines, fluids.     Patient is asymptomatic after 4-hour observation in the emergency department.  Stable for discharge with prescription provided for EpiPen and discussion of indications for use as well as importance of returning to the emergency department if EpiPen is ever used or for recurrence of symptoms.    Critical Care Time Statement: Upon my evaluation, this patient had a high probability of imminent or life-threatening deterioration due to anaphylaxis, which required my direct attention, intervention, and personal management.  I spent a total of 35 minutes directly providing critical care services, including evaluating for the presence of life-threatening injuries or illnesses and complex medical decision  making (to support/prevent further life-threatening deterioration).. This time is exclusive of procedures, teaching, treating other patients, family meetings, and any prior time recorded by providers other than myself.        Critical Care Time  Procedures

## 2025-06-25 NOTE — DISCHARGE INSTRUCTIONS
A prescription for EpiPen's has been sent to your local pharmacy, please pick these up as soon as possible.  Should you start having symptoms again, use the EpiPen as instructed.  You can take Benadryl every 12 hours as needed.  Please follow-up with your primary care provider in the next 48 hours to have reevaluation.

## 2025-06-25 NOTE — ED PROVIDER NOTES
Time reflects when diagnosis was documented in both MDM as applicable and the Disposition within this note       Time User Action Codes Description Comment    6/25/2025  3:27 PM Yg Yang [T78.40XA] Allergic reaction, initial encounter     6/25/2025  3:27 PM Celia Yg Add [J30.9] Allergic rhinitis, unspecified seasonality, unspecified trigger     6/25/2025  3:29 PM Yg Yang Remove [J30.9] Allergic rhinitis, unspecified seasonality, unspecified trigger           ED Disposition       ED Disposition   Discharge    Condition   Stable    Date/Time   Wed Jun 25, 2025  3:27 PM    Comment   Shekhar Nance discharge to home/self care.                   Assessment & Plan       Medical Decision Making  55-year-old female presenting to the ED with allergic reaction to kiwi.    Physician directed room.  On examination, patient does have 2 systems including abdominal pain and itchiness.  I discussed with patient that I would like to treat her with epinephrine for her allergic reaction and patient agreed.    0.3 mg of IM epinephrine given.  Orders placed for 125 mg of Solu-Medrol and 25 mg of IV Benadryl.    Patient reassessed following medication and states she is starting to feel better.  We will observe patient for approximately 4 hours and continue to reevaluate.    During observation period patient was tolerating p.o. intake and able to ambulate appropriately with normal gait.  At the end of the 4-hour danette, patient's vitals were within normal limits and patient states she feel completely better but did feel mildly tired likely from the Benadryl.  Patient states that she does get very tired after taking Benadryl.    I gave patient a prescription for EpiPen's outpatient and counseled her on the use of these.  I also counseled patient on use of Benadryl and strict return precautions that should prompt her to come back to the ED.    Patient will follow-up with her primary care provider in the next 24  to 48 hours.    Patient discharged.    Risk  Prescription drug management.    Following administration of epinephrine, patient was reevaluated and stated she was starting to feel better.         Medications   EPINEPHrine PF (ADRENALIN) 1 mg/mL injection **ADS Override Pull** (  Given 6/25/25 1156)   diphenhydrAMINE (BENADRYL) injection 25 mg (25 mg Intravenous Given 6/25/25 1159)   methylPREDNISolone sodium succinate (Solu-MEDROL) injection 125 mg (125 mg Intravenous Given 6/25/25 1200)       ED Risk Strat Scores                    No data recorded                            History of Present Illness       Chief Complaint   Patient presents with    Allergic Reaction     Pt is allergic to kiwi and had a drink containing kiwi. Throat appears swollen.       Past Medical History[1]   Past Surgical History[2]   Family History[3]   Social History[4]   E-Cigarette/Vaping    E-Cigarette Use Never User       E-Cigarette/Vaping Substances    Nicotine No     THC No     CBD No     Flavoring No     Other No     Unknown No       I have reviewed and agree with the history as documented.     55-year-old female presenting to the ED with itchy throat and abdominal pain after ingesting kiwi juice.  Patient has a known allergy to kiwi and ingested a juice earlier that had kiwi in it.  Patient has never had an EpiPen or needed an EpiPen in the past.  Patient states that last time she had a reaction to kiwi juice she just had itchiness that resolved with Zyrtec however after ingesting this time, the itchiness progressed to severe abdominal pain and did not resolve with Zyrtec.        Review of Systems   Constitutional:  Negative for chills and fever.   HENT:  Negative for congestion and rhinorrhea.         Itchy throat described   Respiratory:  Negative for chest tightness, shortness of breath and wheezing.    Cardiovascular:  Negative for chest pain and palpitations.   Gastrointestinal:  Positive for abdominal pain and nausea. Negative  for diarrhea and vomiting.   Genitourinary:  Negative for dysuria and hematuria.   Musculoskeletal:  Negative for back pain and neck pain.   Neurological:  Negative for dizziness and headaches.           Objective       ED Triage Vitals [06/25/25 1146]   Temperature Pulse Blood Pressure Respirations SpO2 Patient Position - Orthostatic VS   97.8 °F (36.6 °C) 88 127/75 19 99 % Sitting      Temp Source Heart Rate Source BP Location FiO2 (%) Pain Score    Oral Monitor Right arm -- --      Vitals      Date and Time Temp Pulse SpO2 Resp BP Pain Score FACES Pain Rating User   06/25/25 1500 -- 99 99 % 17 100/52 -- -- LA   06/25/25 1400 -- 107 99 % 17 116/56 -- -- CS   06/25/25 1331 -- 95 99 % 18 111/51 -- -- CS   06/25/25 1230 -- 72 100 % 18 129/74 -- -- CS   06/25/25 1206 -- 66 100 % 20 143/87 -- -- CS   06/25/25 1146 97.8 °F (36.6 °C) 88 99 % 19 127/75 -- -- KK            Physical Exam  Constitutional:       Appearance: Normal appearance.   HENT:      Head: Normocephalic and atraumatic.      Right Ear: Tympanic membrane, ear canal and external ear normal.      Left Ear: Tympanic membrane, ear canal and external ear normal.      Nose: Nose normal.      Mouth/Throat:      Mouth: Mucous membranes are moist.      Pharynx: Oropharynx is clear.      Comments: No hives noted but patient states that the back of her throat feels itchy.    Eyes:      Extraocular Movements: Extraocular movements intact.      Pupils: Pupils are equal, round, and reactive to light.       Cardiovascular:      Rate and Rhythm: Normal rate.      Pulses: Normal pulses.      Heart sounds: Normal heart sounds.   Pulmonary:      Effort: Pulmonary effort is normal.      Breath sounds: Normal breath sounds. No wheezing.   Abdominal:      General: Abdomen is flat.      Comments: No increased tenderness on palpation but patient does describe generalized abdominal pain and nausea.     Musculoskeletal:         General: Normal range of motion.      Cervical back:  Normal range of motion.     Skin:     General: Skin is warm.      Capillary Refill: Capillary refill takes less than 2 seconds.     Neurological:      General: No focal deficit present.      Mental Status: She is alert and oriented to person, place, and time.     Psychiatric:         Mood and Affect: Mood normal.         Behavior: Behavior normal.         Results Reviewed       None            No orders to display       ECG 12 Lead Documentation Only    Date/Time: 2025 11:59 AM    Performed by: Yg Bonner MD  Authorized by: Yg Bonner MD    Indications / Diagnosis:  Allergic reaction  Patient location:  ED  Previous ECG:     Previous ECG:  Compared to current    Similarity:  No change  Interpretation:     Interpretation: normal    Rate:     ECG rate:  73    ECG rate assessment: normal    Rhythm:     Rhythm: sinus rhythm    Ectopy:     Ectopy: none    QRS:     QRS axis:  Normal    QRS intervals:  Normal  Conduction:     Conduction: normal    ST segments:     ST segments:  Normal  T waves:     T waves: normal        ED Medication and Procedure Management   Prior to Admission Medications   Prescriptions Last Dose Informant Patient Reported? Taking?   Multiple Vitamin (MULTIVITAMIN) capsule  Self No No   Sig: Take 1 capsule by mouth daily   Patient not taking: Reported on 2024   cholecalciferol (VITAMIN D3) 1,000 units tablet  Self No No   Sig: Take 1 tablet (1,000 Units total) by mouth daily   ciclopirox (PENLAC) 8 % solution   No No   Sig: Apply topically daily at bedtime   clobetasol (TEMOVATE) 0.05 % cream   No No   Sig: Apply topically 2 (two) times a day   docusate sodium (COLACE) 100 mg capsule  Self Yes No   Sig: Take 1 capsule by mouth as needed in the morning and 1 capsule as needed in the evening.   fluticasone (FLONASE) 50 mcg/act nasal spray   No No   Si spray into each nostril daily   Patient not taking: Reported on 2025   levothyroxine 150 mcg tablet   No No   Sig: Take  1 tablet (150 mcg total) by mouth daily   lidocaine (LMX) 4 % cream   No No   Sig: Apply topically as needed for mild pain   Patient not taking: Reported on 12/24/2024   loratadine (CLARITIN) 10 mg tablet  Self No No   Sig: Take 1 tablet (10 mg total) by mouth daily   Patient not taking: Reported on 2/8/2024   pantoprazole (PROTONIX) 20 mg tablet   No No   Sig: Take 1 tablet (20 mg total) by mouth daily before breakfast   Patient not taking: Reported on 9/10/2024   triamcinolone (KENALOG) 0.1 % ointment   No No   Sig: Apply topically 2 (two) times a day      Facility-Administered Medications: None     Discharge Medication List as of 6/25/2025  3:29 PM        START taking these medications    Details   EPINEPHrine (EPIPEN) 0.3 mg/0.3 mL SOAJ Inject 0.3 mL (0.3 mg total) into a muscle once for 1 dose For severe allergic reaction, Starting Wed 6/25/2025, Normal           CONTINUE these medications which have NOT CHANGED    Details   cholecalciferol (VITAMIN D3) 1,000 units tablet Take 1 tablet (1,000 Units total) by mouth daily, Starting Thu 7/29/2021, Normal      ciclopirox (PENLAC) 8 % solution Apply topically daily at bedtime, Starting Thu 5/29/2025, Normal      clobetasol (TEMOVATE) 0.05 % cream Apply topically 2 (two) times a day, Starting Wed 5/28/2025, Normal      docusate sodium (COLACE) 100 mg capsule Take 1 capsule by mouth as needed in the morning and 1 capsule as needed in the evening., Starting Fri 7/26/2013, Historical Med      fluticasone (FLONASE) 50 mcg/act nasal spray 1 spray into each nostril daily, Starting Wed 9/27/2023, Normal      levothyroxine 150 mcg tablet Take 1 tablet (150 mcg total) by mouth daily, Starting Wed 5/28/2025, Normal      lidocaine (LMX) 4 % cream Apply topically as needed for mild pain, Starting Wed 8/14/2024, Normal      loratadine (CLARITIN) 10 mg tablet Take 1 tablet (10 mg total) by mouth daily, Starting Thu 4/4/2019, Normal      Multiple Vitamin (MULTIVITAMIN) capsule Take  1 capsule by mouth daily, Starting Thu 6/27/2019, Normal      pantoprazole (PROTONIX) 20 mg tablet Take 1 tablet (20 mg total) by mouth daily before breakfast, Starting Wed 8/14/2024, Until Fri 9/13/2024, Normal      triamcinolone (KENALOG) 0.1 % ointment Apply topically 2 (two) times a day, Starting Wed 5/28/2025, Normal           No discharge procedures on file.  ED SEPSIS DOCUMENTATION   Time reflects when diagnosis was documented in both MDM as applicable and the Disposition within this note       Time User Action Codes Description Comment    6/25/2025  3:27 PM Yg Yang [T78.40XA] Allergic reaction, initial encounter     6/25/2025  3:27 PM Yg Yang [J30.9] Allergic rhinitis, unspecified seasonality, unspecified trigger     6/25/2025  3:29 PM Yg Yang Remove [J30.9] Allergic rhinitis, unspecified seasonality, unspecified trigger                      [1]   Past Medical History:  Diagnosis Date    Allergic     Anemia     iron def    Disease of thyroid gland     Dyspareunia in female     Fatigue     Hemorrhoids     Hypothyroidism     Rectal bleeding     Varicose veins of both legs with edema    [2]   Past Surgical History:  Procedure Laterality Date    COLONOSCOPY  2008    COLONOSCOPY  11/15/2019    10 YEAR RECOMMENDED   [3]   Family History  Problem Relation Name Age of Onset    Hypertension Mother      Asthma Father      No Known Problems Sister      No Known Problems Daughter      No Known Problems Maternal Grandmother      No Known Problems Maternal Grandfather      No Known Problems Paternal Grandmother      No Known Problems Paternal Grandfather      No Known Problems Brother      No Known Problems Son      No Known Problems Maternal Aunt      No Known Problems Maternal Aunt      No Known Problems Paternal Aunt      No Known Problems Paternal Aunt      No Known Problems Paternal Aunt     [4]   Social History  Tobacco Use    Smoking status: Never    Smokeless tobacco: Never    Vaping Use    Vaping status: Never Used   Substance Use Topics    Alcohol use: No    Drug use: No        Yg Bonner MD  06/25/25 [id number]

## 2025-07-02 ENCOUNTER — OFFICE VISIT (OUTPATIENT)
Dept: FAMILY MEDICINE CLINIC | Facility: CLINIC | Age: 55
End: 2025-07-02

## 2025-07-02 VITALS
SYSTOLIC BLOOD PRESSURE: 123 MMHG | HEART RATE: 57 BPM | BODY MASS INDEX: 21.49 KG/M2 | TEMPERATURE: 98.5 F | DIASTOLIC BLOOD PRESSURE: 75 MMHG | WEIGHT: 125.2 LBS | RESPIRATION RATE: 18 BRPM | OXYGEN SATURATION: 98 %

## 2025-07-02 DIAGNOSIS — R11.0 NAUSEA: Primary | ICD-10-CM

## 2025-07-02 PROCEDURE — 99213 OFFICE O/P EST LOW 20 MIN: CPT | Performed by: FAMILY MEDICINE

## 2025-07-02 RX ORDER — ONDANSETRON 4 MG/1
4 TABLET, FILM COATED ORAL EVERY 8 HOURS PRN
Qty: 20 TABLET | Refills: 0 | Status: SHIPPED | OUTPATIENT
Start: 2025-07-02 | End: 2025-07-09

## 2025-07-02 NOTE — PROGRESS NOTES
Name: Shekhar Nance      : 1970      MRN: [id number]  Encounter Provider: Krysten Torres MD  Encounter Date: 2025   Encounter department: [facility] [place]  :  Assessment & Plan  Nausea  Recent ED visit for kiwi allergy with chief complaint of abdominal pain and itchiness.  Also reports abdominal discomfort, nausea and vomiting.  Received IM epinephrine, Solu-Medrol and Benadryl in the ED.  Patient tried Claritin and Pepto-Bismol at home for nausea.  Ginger ale helped her nausea.  Denied fever, chills, chest pain, shortness of breath, dizziness, or headache.  She could not eat well since last week and no bowel movement in the past few days.   EKG taken last week was normal.  Qtc 456 ms    Plans:  Ondansetron 4 mg every 8 hours as needed for nausea  Try ginger ale/Gatorade/lemonade for nausea  Follow-up as needed or annual physical with primary care physician    Orders:    ondansetron (ZOFRAN) 4 mg tablet; Take 1 tablet (4 mg total) by mouth every 8 (eight) hours as needed for nausea or vomiting for up to 7 days           History of Present Illness   Ms. Shekhar Nance is a healthy 55-year-old female with a past medical history of hypothyroidism presents to the office today with chief complaint of nausea for more than a week.  She reports that she has a history of kiwi allergy, however she took kiwi juice by mistake.  She had an upset stomach with nausea, vomiting and abdominal pain last week. She went to the ED on 2025 for kiwi allergy with abdominal pain and itchiness.  She received IM epinephrine, Solu-Medrol and Benadryl in the ED.  She tried Claritin and Pepto-Bismol at home for nausea.  Denied vomiting after ED visit.  She tried ginger ale yesterday and that helped for her nausea.  She could not eat well since last week and no bowel movement in the past few days.  She reports a few pound weight loss within a week.  Denied fever, chills, chest pain, shortness of breath,  dizziness, or headache.             Review of Systems   Constitutional:  Negative for chills, fatigue, fever and unexpected weight change.   HENT:  Negative for ear pain, sore throat, trouble swallowing and voice change.    Eyes:  Negative for pain and visual disturbance.   Respiratory:  Negative for cough, choking, shortness of breath and wheezing.    Cardiovascular:  Negative for chest pain, palpitations and leg swelling.   Gastrointestinal:  Positive for nausea. Negative for abdominal distention, abdominal pain, blood in stool, constipation, diarrhea, rectal pain and vomiting.        No bowel movement for a few days as she does not eat much due to nausea.   Genitourinary: Negative.  Negative for dysuria and hematuria.   Musculoskeletal:  Negative for arthralgias, back pain and neck pain.   Skin:  Negative for color change and rash.   Neurological:  Negative for seizures, syncope and weakness.   Hematological:  Does not bruise/bleed easily.   All other systems reviewed and are negative.      Objective   /75 (BP Location: Right arm, Patient Position: Sitting, Cuff Size: Standard)   Pulse 57   Temp 98.5 °F (36.9 °C)   Resp 18   Wt 56.8 kg (125 lb 3.2 oz)   LMP 08/15/2020 (Approximate)   SpO2 98%   BMI 21.49 kg/m²      Physical Exam  Vitals and nursing note reviewed.   Constitutional:       General: She is not in acute distress.     Appearance: She is well-developed. She is not toxic-appearing.   HENT:      Head: Normocephalic and atraumatic.      Mouth/Throat:      Mouth: Mucous membranes are moist.      Pharynx: Oropharynx is clear.     Eyes:      Extraocular Movements: Extraocular movements intact.      Conjunctiva/sclera: Conjunctivae normal.      Pupils: Pupils are equal, round, and reactive to light.       Cardiovascular:      Rate and Rhythm: Normal rate and regular rhythm.      Heart sounds: Normal heart sounds. No murmur heard.     No gallop.   Pulmonary:      Effort: Pulmonary effort is normal.  No respiratory distress.      Breath sounds: Normal breath sounds. No wheezing or rales.   Abdominal:      General: There is no distension.      Palpations: Abdomen is soft.      Tenderness: There is no abdominal tenderness. There is no guarding.     Musculoskeletal:         General: No swelling.      Cervical back: Neck supple.      Right lower leg: No edema.      Left lower leg: No edema.     Skin:     General: Skin is warm and dry.      Capillary Refill: Capillary refill takes less than 2 seconds.      Coloration: Skin is not jaundiced or pale.      Findings: No lesion or rash.     Neurological:      Mental Status: She is alert and oriented to person, place, and time.     Psychiatric:         Mood and Affect: Mood normal.         Administrative Statements   I have spent a total time of 20 minutes in caring for this patient on the day of the visit/encounter including Diagnostic results, Risks and benefits of tx options, Instructions for management, Patient and family education, Importance of tx compliance, Risk factor reductions, Impressions, Counseling / Coordination of care, Documenting in the medical record, Reviewing/placing orders in the medical record (including tests, medications, and/or procedures), Obtaining or reviewing history  , and Communicating with other healthcare professionals .

## 2025-07-02 NOTE — PATIENT INSTRUCTIONS
Ondansetron 4 mg every 8 hours as needed for nausea  Try ginger ale/Gatorade/lemonade for nausea  Follow-up as needed or annual physical with primary care physician

## 2025-07-14 ENCOUNTER — EVALUATION (OUTPATIENT)
Dept: PHYSICAL THERAPY | Facility: REHABILITATION | Age: 55
End: 2025-07-14
Payer: COMMERCIAL

## 2025-07-14 DIAGNOSIS — K59.00 CONSTIPATION, UNSPECIFIED CONSTIPATION TYPE: Primary | ICD-10-CM

## 2025-07-14 DIAGNOSIS — N81.10 CYSTOCELE WITH RECTOCELE: ICD-10-CM

## 2025-07-14 DIAGNOSIS — N81.6 CYSTOCELE WITH RECTOCELE: ICD-10-CM

## 2025-07-14 PROCEDURE — 97530 THERAPEUTIC ACTIVITIES: CPT | Performed by: PHYSICAL THERAPIST

## 2025-07-14 PROCEDURE — 97162 PT EVAL MOD COMPLEX 30 MIN: CPT | Performed by: PHYSICAL THERAPIST

## 2025-07-14 NOTE — PROGRESS NOTES
PT Evaluation     Today's date: 2025  Patient name: Shekhar Nance  : 1970  MRN: [id number]  Referring provider: Rosina Patel PA-C  Dx:   Encounter Diagnosis     ICD-10-CM    1. Constipation, unspecified constipation type  K59.00       2. Cystocele with rectocele  N81.10     N81.6           Start Time: 0800  Stop Time: 0850  Total time in clinic (min): 50 minutes    Assessment  Impairments: abnormal coordination, abnormal or restricted ROM, activity intolerance, impaired physical strength, lacks appropriate home exercise program and pain with function    Assessment details: The patient is a 55 y.o. female with complaints of recent episode of constipation.  Her history includes constipation, as well as rectocele/cystocele. Her recent episode of constipation began about 3 weeks ago after an allergic reaction to a drink with fresh Kiwi in it. She since has been experiencing nausea, a limited appetitive, and constipation.  She presents with good awareness of her pelvic floor muscles upon examination. She has no pain or tenderness during the exam. She does exhibit some weakness and limited endurance. Also limited lengthening and difficulty bearing down with cues noted. She does also have a visible cystocele and rectocele. Did discuss some ways to help manage constipation including smaller, more frequent meals to help with nausea and also to try to improve water intake during the day She would benefit from pelvic floor physical therapy to help reduce/manage pain and symptoms, address impairments and maximize function and quality of life upon discharge. female will be given updated HEP throughout episode of care.     Therapeutic activities performed upon examination included education regarding pelvic floor anatomy, explanation of exam technique, explanation of exam findings and discussion of treatment plan as well as expectations of the patient to emphasize the importance of compliance and adherence to  physical therapy visits.               Understanding of Dx/Px/POC: good    Goals      PROLAPSE    ST. The patient will improve strength of pelvic floor muscle contraction by 1 grade to counteract pressure and give better support to pelvic organs while performing activities such as lifting and reaching in 12 visits.   2. The patient will improve endurance of pelvic floor muscle contraction to 5 seconds to help maintain support throughout change in position and movements in 12 visits.   3. The patient will reduce severity and frequency or heaviness, pressure, or bulging by 25 to 50% in 12 visits.   4. The patient will demonstrate good posture with sitting on toilet to promote proper pelvic floor muscle lengthening.   5. The patient will demonstrate good isolation of respiratory diaphragm and good coordination of diaphragm and transverse abdominis in 8 to 10 weeks.     LT. The patient will demonstrate proper breathing and body mechanics to help maintain pelvic floor health and prevent worsening of condition.  2. The patient will be independent with HEP to help maintain strength gains and manage and prevent symptoms from worsening upon discharge.    3. The patient will minimize straining for evacuation of bowels upon discharge.                              Plan  Patient would benefit from: skilled physical therapy  Planned modality interventions: biofeedback    Planned therapy interventions: activity modification, abdominal trunk stabilization, behavior modification, body mechanics training, breathing training, coordination, flexibility, functional ROM exercises, IADL retraining, manual therapy, neuromuscular re-education, patient education, postural training, self care, strengthening, therapeutic activities and therapeutic exercise    Frequency: 1x week  Duration in weeks: 12  Plan of Care beginning date: 2025  Plan of Care expiration date: 10/6/2025  Treatment plan discussed with: patient      PT  Pelvic Floor Subjective:   History of Present Illness:   The patient reports that almost 3 weeks ago, she drank a blended fruit juice that her  made, and it had Kiwi in it. She did not realize that it had Kiwi in it. It had apple, pineapple it it as well. She had about 1/3 of a cup. Immediately she felt something in her throat. She then started to feel sick to her stomach and nauseous and had some diarrhea. She took Benadryl. She also went to the ER and they prescribed her an Epi Pen in case she needs it in the future. She was diagnosed with an allergy previously. Since then, she has experienced nausea and her appetite is suppressed. She has been experiencing constipation since then. Her stools are really hard. She was also prescribed Zofran for the nausea, but this also caused constipation. She has been taking Colace two times a day to help with softening her stool. Her last colonoscopy was in 2019/2020.         Mo fre[place] (has a history of constipation)      Social Support:     Lives with:  Spouse and adult children    Relationship status: /committed    Work status: employed full time (working for a [employer] company ([employer]); sits and stands for work; 8-10 shifts)    History of Depression: no  Diet and Exercise:      Tries to walk every morning in her neighborhood  Bladder Function:     Voiding Difficulties negative for: urgency, frequent urination and incomplete emptying       Voiding Difficulties comments:     Urinary leakage: no urine leakage    Nocturia (episodes per night): 0    Fluid Intake Type:  Soda, water and tea    Intake (ounces):     Intake (ounces) comment: Ginger ale - 2 or 3 cans due to nausea  1 cup of tea in the morning  2 cups of water first thing in the morning  No water during the day  1 1/2 cups of water at night     No bloating or gasiness  Does feel some pressure/heaviness vaginally at times, not as bad as before  Bowel Function:     Voiding DIfficulties: painful  defecating, unfinished feeling after defecating and constipation      Bowel Function comments:  Does have hemorrhoids, but current constipation is not bothering them  Every day, tries to go twice a day  Stools are really hard  Has to push  Can be painful sometimes and sore afterwards  Does not feel she is completely emptying    Bowel frequency: daily and multiple times a day    [place] Stool Scale: type 1    Stool softener use: stool softeners  Sexual Function:     Pain during intercourse: No    Pain:     No pain reported by patient.    Location:  Feels like something is coming out when she is sitting on the toilet trying to have a bowel movement      Objective       Abdominal Assessment:          General Perineum Exam:   perineum intact.   Positive for descent.   Negative for swelling, lesion, gaping introitus, hemorrhoids and perianal erythema    General perineum exam comments: Pelvic floor verbal consent and written consent signed and in chart    Education provided today:   Time Spent on Patient Education: 15 min    Pelvic floor anatomy and function  Physiology/relationship of abdominal canister and pelvis/pelvic organs/pelvic floor muscles  Diaphragm and Diaphragmatic breathing  Bowel and Bladder anatomy and function    Pelvic Organ Prolapse - explanation and discussion of management of symptoms  Importance of body mechanics and ergonomics in regards to protecting against activities which increase IAP and pressure    Toileting posture and body mechanics  Constipation Education  PT exam and course of treatment    Future Education To be Provided:     Visual Inspection of Perineum:   Excursion of perineal body in cephalad direction with contraction of pelvic floor muscles (PFM): weak  Excursion of perineal body in caudal direction with relaxation of pelvic floor muscles (PFM): weak  Involuntary contraction with coughing: no  Involuntary relaxation with bearing down: no  PFM Contraction Comments: [place]  palpation: no tenderness reported  -Pubic Symphysis:  -Ischial Tuberosity  -Pubic Rami      Pelvic Clock (external muscle palpation): no tenderness reported  -Ischiocavernosus (1, 11):  -Bulbocavernosis (2, 10):  -STP/DTP (3, 9):  -Perineal Body (0)  -Pubococcygeus (4,8):  Iliococcygeus (5,7):  -Coccyx (6)    Cotton Swab Test:  -Inner thigh  -labia majora  -interlabial sulcus:  -perineal body  -clitoris  -clitoral waddell   Sensation: intact    Pelvic Organ Prolapse   Position: hook-lying  At rest: moderate  With bearing down: moderate (to the level of hymenal remnants)  Location: anterior and posterior  Perineal body inspection: bulging        Pelvic Floor Muscle Exam:     Muscle Contraction: unable to perform    100% pelvic floor relaxation        PERFECT Score   Power right: 1/5   Power left: 1/5         Rectal Pelvic Floor Muscle Exam  no hemorrhoids    pelvic floor exam consent given by patient    Pelvic exam completed: vaginally                 IE RE   URSULA-18     PFDI-20 21    V-Q     PGQ         Precautions:   [facility] HEP:    Manuals 7/14                                                                Neuro Re-Ed             Diaphragmatic Breathing             TA ADIM             TA + PFMC                          Biofeedback sEMG             PFMC: Slow Holds             PFMC: Quick Flicks             PFMC + hip abd isometrics             PFMC + hip add isometrics             PFMC + ecc bridge             PFMC in sitting             PFMC seated + tilt             PFMC in standing             PFMC in Quadruped                                                                 Ther Ex             PPT             Bridge + SLR             Bridge + marching                                                                              Ther Activity             EDUCATION 15 min            PFMC + reach             PFMC + squat             PFMC + lift             PFMC + sit to stand             PFMC + step up/down              Gait Training                                       Modalities

## 2025-07-17 ENCOUNTER — OFFICE VISIT (OUTPATIENT)
Dept: PHYSICAL THERAPY | Facility: REHABILITATION | Age: 55
End: 2025-07-17
Payer: COMMERCIAL

## 2025-07-17 DIAGNOSIS — N81.6 CYSTOCELE WITH RECTOCELE: ICD-10-CM

## 2025-07-17 DIAGNOSIS — N81.10 CYSTOCELE WITH RECTOCELE: ICD-10-CM

## 2025-07-17 DIAGNOSIS — K59.00 CONSTIPATION, UNSPECIFIED CONSTIPATION TYPE: Primary | ICD-10-CM

## 2025-07-17 PROCEDURE — 97112 NEUROMUSCULAR REEDUCATION: CPT | Performed by: PHYSICAL THERAPIST

## 2025-07-17 PROCEDURE — 97530 THERAPEUTIC ACTIVITIES: CPT | Performed by: PHYSICAL THERAPIST

## 2025-07-17 NOTE — PROGRESS NOTES
"Daily Note     Today's date: 2025  Patient name: Shekhar Nance  : 1970  MRN: [id number]  Referring provider: Makayla Kramer MD  Dx:   Encounter Diagnosis     ICD-10-CM    1. Constipation, unspecified constipation type  K59.00       2. Cystocele with rectocele  N81.10     N81.6           Start Time: 0845  Stop Time: 0930  Total time in clinic (min): 45 minutes    Subjective: The patient notes that the past two days she has been doing better in regards to her constipation. She stopped taking the Zofran and this has helped. She has been having a daily bowel movement and her stools have not been as hard. Her appetite has been better she has been less nauseous. Her stool have been more Type 5.        Objective: See treatment diary below      Assessment: Tolerated treatment well. Patient initiated therapeutic treatment plan today. She did well with exercises. She has good awareness overall. Worked on deep core engagement and PFM relaxation/lengthening. Utilized pinwheel in sitting to help with breath work and PFM lengthening. This was really effective for the patient. She was given HEP for home. She will return in 2 weeks for her next visit.       Plan: Continue per plan of care.       IE RE   URSULA-18     PFDI-20 21    V-Q     PGQ         Precautions:   [facility] HEP: E Y P R C 8 3 7     Manuals                                                                Neuro Re-Ed             Diaphragmatic Breathing  5 min           TA ADIM  5\"x10           TA + PFMC                          Biofeedback sEMG             PFMC: Slow Holds             PFMC: Quick Flicks             PFMC + hip abd isometrics             PFMC + hip add isometrics             PFMC + ecc bridge             PFMC in sitting             PFMC seated + tilt             PFMC in standing             PFMC in Quadruped                          DKC with PFM downtraining  30 sec x 3                                     Ther Ex             PPT          "    Bridge + SLR             Bridge + marching                                                                              Ther Activity             EDUCATION 15 min 20 min           Defecation mechanics  done           PFMC + reach             PFMC + squat             PFMC + lift             PFMC + sit to stand             PFMC + step up/down             Gait Training                                       Modalities

## 2025-07-31 ENCOUNTER — OFFICE VISIT (OUTPATIENT)
Dept: PHYSICAL THERAPY | Facility: REHABILITATION | Age: 55
End: 2025-07-31
Payer: COMMERCIAL

## 2025-07-31 DIAGNOSIS — N81.10 CYSTOCELE WITH RECTOCELE: ICD-10-CM

## 2025-07-31 DIAGNOSIS — K59.00 CONSTIPATION, UNSPECIFIED CONSTIPATION TYPE: Primary | ICD-10-CM

## 2025-07-31 DIAGNOSIS — N81.6 CYSTOCELE WITH RECTOCELE: ICD-10-CM

## 2025-07-31 PROCEDURE — 97112 NEUROMUSCULAR REEDUCATION: CPT | Performed by: PHYSICAL THERAPIST

## 2025-07-31 PROCEDURE — 97140 MANUAL THERAPY 1/> REGIONS: CPT | Performed by: PHYSICAL THERAPIST

## 2025-07-31 PROCEDURE — 97530 THERAPEUTIC ACTIVITIES: CPT | Performed by: PHYSICAL THERAPIST

## 2025-08-14 ENCOUNTER — OFFICE VISIT (OUTPATIENT)
Dept: PHYSICAL THERAPY | Facility: REHABILITATION | Age: 55
End: 2025-08-14
Payer: COMMERCIAL

## 2025-08-20 DIAGNOSIS — E03.9 HYPOTHYROIDISM, UNSPECIFIED TYPE: ICD-10-CM

## 2025-08-20 RX ORDER — LEVOTHYROXINE SODIUM 150 UG/1
150 TABLET ORAL DAILY
Qty: 90 TABLET | Refills: 0 | Status: SHIPPED | OUTPATIENT
Start: 2025-08-20

## 2025-08-21 ENCOUNTER — OFFICE VISIT (OUTPATIENT)
Dept: PHYSICAL THERAPY | Facility: REHABILITATION | Age: 55
End: 2025-08-21
Payer: COMMERCIAL

## 2025-08-21 DIAGNOSIS — K59.00 CONSTIPATION, UNSPECIFIED CONSTIPATION TYPE: Primary | ICD-10-CM

## 2025-08-21 DIAGNOSIS — N81.6 CYSTOCELE WITH RECTOCELE: ICD-10-CM

## 2025-08-21 DIAGNOSIS — N81.10 CYSTOCELE WITH RECTOCELE: ICD-10-CM

## 2025-08-21 PROCEDURE — 97112 NEUROMUSCULAR REEDUCATION: CPT | Performed by: PHYSICAL THERAPIST

## 2025-08-21 PROCEDURE — 97140 MANUAL THERAPY 1/> REGIONS: CPT | Performed by: PHYSICAL THERAPIST

## 2025-08-21 PROCEDURE — 97530 THERAPEUTIC ACTIVITIES: CPT | Performed by: PHYSICAL THERAPIST
